# Patient Record
Sex: FEMALE | Employment: OTHER | URBAN - METROPOLITAN AREA
[De-identification: names, ages, dates, MRNs, and addresses within clinical notes are randomized per-mention and may not be internally consistent; named-entity substitution may affect disease eponyms.]

---

## 2017-05-09 DIAGNOSIS — I10 ESSENTIAL HYPERTENSION: ICD-10-CM

## 2017-05-10 RX ORDER — AMLODIPINE BESYLATE 5 MG/1
TABLET ORAL
Qty: 30 TAB | Refills: 5 | Status: SHIPPED | OUTPATIENT
Start: 2017-05-10 | End: 2018-04-05 | Stop reason: SDUPTHER

## 2017-06-06 ENCOUNTER — OFFICE VISIT (OUTPATIENT)
Dept: INTERNAL MEDICINE CLINIC | Age: 82
End: 2017-06-06

## 2017-06-06 VITALS
HEART RATE: 82 BPM | BODY MASS INDEX: 26.24 KG/M2 | RESPIRATION RATE: 20 BRPM | HEIGHT: 61 IN | TEMPERATURE: 97.5 F | DIASTOLIC BLOOD PRESSURE: 72 MMHG | OXYGEN SATURATION: 94 % | WEIGHT: 139 LBS | SYSTOLIC BLOOD PRESSURE: 154 MMHG

## 2017-06-06 DIAGNOSIS — R41.3 MEMORY DEFICIT: ICD-10-CM

## 2017-06-06 DIAGNOSIS — I10 ESSENTIAL HYPERTENSION: Primary | ICD-10-CM

## 2017-06-06 DIAGNOSIS — Z23 ENCOUNTER FOR IMMUNIZATION: ICD-10-CM

## 2017-06-06 DIAGNOSIS — M81.0 OSTEOPOROSIS, UNSPECIFIED OSTEOPOROSIS TYPE, UNSPECIFIED PATHOLOGICAL FRACTURE PRESENCE: ICD-10-CM

## 2017-06-06 DIAGNOSIS — Z00.00 MEDICARE ANNUAL WELLNESS VISIT, INITIAL: ICD-10-CM

## 2017-06-06 DIAGNOSIS — Z71.89 ACP (ADVANCE CARE PLANNING): ICD-10-CM

## 2017-06-06 NOTE — MR AVS SNAPSHOT
Visit Information Date & Time Provider Department Dept. Phone Encounter #  
 6/6/2017 12:30 PM Nneka Martins MD [facility] Internal Medicine [facility] [phone] [id number] Your Appointments 6/6/2017 12:30 PM  
[facility] App[facility] Note: follow up and wants to change to Dr Leanna Quinones  
 [address] [place] [address]. [place] [phone]  
  
   
 [address] [place] [address]. [address] [place] [id number] Upcoming Health Maintenance Date Due DTaP/Tdap/Td series (1 - Tdap) 8/15/1950 ZOSTER VACCINE AGE 60> 8/15/1989 Pneumococcal 65+ Low/Medium Risk (1 of 2 - PCV13) 8/15/1994 INFLUENZA AGE 9 TO ADULT 8/1/2017 MEDICARE YEARLY EXAM 6/7/2018 GLAUCOMA SCREENING Q2Y 6/6/2019 Allergies as of 6/6/2017  Review Complete On: 6/6/2017 By: Nneka Martins MD  
 No Known Allergies Current Immunizations  Never Reviewed Name Date Zoster Vaccine, Live  Incomplete Not reviewed this visit You Were Diagnosed With   
  
 Codes Comments Essential hypertension    -  Primary ICD-10-CM: I10 
ICD-9-CM: 401.9 Memory deficit     ICD-10-CM: R41.3 ICD-9-CM: 780.93 Encounter for immunization     ICD-10-CM: A56 ICD-9-CM: V03.89 Osteoporosis, unspecified osteoporosis type, unspecified pathological fracture presence     ICD-10-CM: M81.0 ICD-9-CM: 733.00   
 ACP (advance care planning)     ICD-10-CM: Z71.89 ICD-9-CM: V65.49 Vitals BP Pulse Temp Resp Height(growth percentile) Weight(growth percentile) 154/72 (BP 1 Location: Left arm, BP Patient Position: Sitting) 82 97.5 °F (36.4 °C) (Oral) 20 5' 1\" (1.549 m) 139 lb (63 kg) SpO2 BMI OB Status Smoking Status 94% 26.26 kg/m2 Hysterectomy Never Smoker Vitals History BMI and BSA Data Body Mass Index Body Surface Area  
 26.26 kg/m 2 1.65 m 2 Preferred Pharmacy Pharmacy Name Phone [facility] [phone] Your Updated Medication List  
  
   
This list is accurate as of: 17 11:10 AM.  Always use your most recent med list.  
  
  
  
  
 alendronate 70 mg tablet Commonly known as:  FOSAMAX Take 1 Tab by mouth every seven (7) days. amLODIPine 5 mg tablet Commonly known as:  Emilia Medici TAKE 1 TABLET BY MOUTH DAILY. Arm Brace Misc Commonly known as:  NEOPRENE WRIST SPLINT SUPPORT Use in both hands  
  
 ascorbic acid (vitamin C) 500 mg tablet Commonly known as:  VITAMIN C Take  by mouth.  
  
 calcium 500 mg Tab Take  by mouth. diclofenac EC 50 mg EC tablet Commonly known as:  VOLTAREN  
TAKE 1 TABLET BY MOUTH DAILY AS NEEDED. folic acid 425 mcg tablet Take 800 mcg by mouth daily. gabapentin 100 mg capsule Commonly known as:  NEURONTIN Take 1 Cap by mouth nightly. Leg Brace Misc Commonly known as:  ACE KNEE BRACE  
by Does Not Apply route. Use on right knee  
  
 loratadine 10 mg tablet Commonly known as:  Carloyn Ely Take 1 Tab by mouth daily as needed for Allergies. multivitamin tablet Commonly known as:  ONE A DAY Take 1 Tab by mouth daily. pneumococcal 13 noble conj dip 0.5 mL Syrg injection Commonly known as:  PREVNAR-13  
0.5 mL by IntraMUSCular route once for 1 dose. VITAMIN B-12 1,000 mcg tablet Generic drug:  cyanocobalamin Take 1,000 mcg by mouth daily. VITAMIN B-6 100 mg tablet Generic drug:  pyridoxine (vitamin B6) Take 100 mg by mouth daily. VITAMIN D3 1,000 unit tablet Generic drug:  cholecalciferol Take  by mouth daily. vitamin E 400 unit capsule Commonly known as:  [address] Take  by mouth daily. Prescriptions Sent to Pharmacy Refills  
 pneumococcal 13 noble conj dip (PREVNAR-13) 0.5 mL syrg injection 0 Si.5 mL by IntraMUSCular route once for 1 dose. Class: Normal  
 Pharmacy: [address] [place] [address]  Ph #: [phone] Route: IntraMUSCular We Performed the Following ZOSTER (SHINGLES) VACCINE, LIVE, SC INJECTION [id number] CPT(R)] Patient Instructions Schedule of Personalized Health Plan (Provide Copy to Patient) The best way to stay healthy is to live a healthy lifestyle. A healthy lifestyle includes regular exercise, eating a well-balanced diet, keeping a healthy weight and not smoking. Regular physical exams and screening tests are another important way to take care of yourself. Preventive exams provided by health care providers can find health problems early when treatment works best and can keep you from getting certain diseases or illnesses. Preventive services include exams, lab tests, screenings, shots, monitoring and information to help you take care of your own health. All people over 65 should have a pneumonia shot. Pneumonia shots are usually only needed once in a lifetime unless your doctor decides differently. WILL ORDER PNEUMONIA 13 VACCIEN TODAY. All people over 65 should have a yearly flu shot. AS SCHEDULE People over 65 are at medium to high risk for Hepatitis B. Three shots are needed for complete protection. In addition to your physical exam, some screening tests are recommended: 
 
Bone mass measurement (dexa scan) is recommended every two years. UP TO DATE Diabetes Mellitus screening is recommended every year. UP TO DATE Glaucoma is an eye disease caused by high pressure in the eye. An eye exam is recommended every year. PT DECLINED Cardiovascular screening tests that check your cholesterol and other blood fat (lipid) levels are recommended every five years.   
 
Colorectal Cancer screening tests help to find pre-cancerous polyps (growths in the colon) so they can be removed before they turn into cancer. Tests ordered for screening depend on your personal and family history risk factors. N/A Screening for Breast Cancer is recommended yearly with a mammogram.N/A Screening for Cervical Cancer is recommended every two years (annually for certain risk factors, such as previous history of STD or abnormal PAP in past 7 years), with a Pelvic Exam with PAP. N/A. WILL GIVE OPAPER FOR LIVING WILL. Here is a list of your current Health Maintenance items with a due date: 
Health Maintenance Topic Date Due  
 DTaP/Tdap/Td series (1 - Tdap) 08/15/1950  ZOSTER VACCINE AGE 60>  08/15/1989  GLAUCOMA SCREENING Q2Y  08/15/1994  Pneumococcal 65+ Low/Medium Risk (1 of 2 - PCV13) 08/15/1994  
 INFLUENZA AGE 9 TO ADULT  08/01/2017  MEDICARE YEARLY EXAM  06/07/2018  
 OSTEOPOROSIS SCREENING (DEXA)  Completed Introducing [facility]! Dear Isis Desouza: Thank you for requesting a [facility] account. Our records indicate that you have previously registered for a [facility] account but its currently inactive. Please call our [facility] support line at [phone]. Additional Information If you have questions, please visit the Frequently Asked Questions section of the [facility] website at [email or web address] Remember, [facility] is NOT to be used for urgent needs. For medical emergencies, dial 911. Now available from your iPhone and Android! Please provide this summary of care documentation to your next provider. Your primary care clinician is listed as Cha Singh. If you have any questions after today's visit, please call [phone].

## 2017-06-06 NOTE — PROGRESS NOTES
HISTORY OF PRESENT ILLNESS  Jeison Cotto is a 80 y.o. female here to follow up. Doing well. Has HTN,on med. no chest pain or palpitation or SOB. Has OP. on med. Has elevated lipid. wathing diet. Need vaccine. Has no living will. Reports compliance with medications and diet. Med list and most recent labs/studies reviewed with pt. t active physically to control weight. Needs med refills. Reports no other new c/o. Hypertension      Osteoporosis     Memory Loss    Her past medical history is significant for hypertension. Follow-up         Review of Systems   Constitutional: Negative. Negative for fever. Eyes: Negative. Respiratory: Negative. Cardiovascular: Negative. Gastrointestinal: Negative. Musculoskeletal: Positive for joint pain. Skin: Negative. Neurological: Negative. Psychiatric/Behavioral: Positive for memory loss. Physical Exam   Constitutional: She appears well-developed and well-nourished. No distress. Neck: Normal range of motion. Neck supple. No JVD present. No thyromegaly present. Cardiovascular: Normal rate, regular rhythm, normal heart sounds and intact distal pulses. Pulmonary/Chest: Effort normal and breath sounds normal. No respiratory distress. She has no wheezes. Musculoskeletal: She exhibits no edema or tenderness. Psychiatric: She has a normal mood and affect. Her behavior is normal.       ASSESSMENT and PLAN    Kaci Cole was seen today for hypertension, osteoporosis and memory loss. Diagnoses and all orders for this visit:    Essential hypertension  Stable. on med. Labs are stable. will repeat it next time. Memory deficit  MMSE 28. No dementia. Encounter for immunization  Will order,  -     pneumococcal 13 noble conj dip (PREVNAR-13) 0.5 mL syrg injection; 0.5 mL by IntraMUSCular route once for 1 dose.   -     ZOSTER (SHINGLES) VACCINE, LIVE, SC INJECTION    Osteoporosis, unspecified osteoporosis type, unspecified pathological fracture presence  On fosamax and calcium. ACP (advance care planning)    ACP discussed with pt in detail , including choosing a healthcare agent to communicate patient's healthcare decisions if patient lost the ability to make decisions, such as after a sudden illness or accident. Understanding of the healthcare agent role was assessed and information provided. Discussed values, goals, and preferences if recovery is not expected, even with continued medical treatment in the event of: Imminent death/serious illness. Recommended completion of Advance Directive form after review of ACP materials and conversation with prospective healthcare agent. Recommended communicating the plan and making copies for the healthcare agent, personal physician, and others as appropriate (e.g., health system). Recommended review of completed ACP document annually or upon change in health status. Information book and form given. Face to face time spent was16 minutes            Discussed expected course/resolution/complications of diagnosis in detail with patient. Medication risks/benefits/costs/interactions/alternatives discussed with patient. Pt was given an after visit summary which includes diagnoses, current medications & vitals. Pt expressed understanding with the diagnosis and plan.

## 2017-06-06 NOTE — PROGRESS NOTES
Miller Muniz is a 80 y.o. female and presents for annual Medicare Wellness Visit. Problem List: Reviewed with patient and discussed risk factors. Patient Active Problem List   Diagnosis Code    Essential hypertension I10    OP (osteoporosis) M81.0       Current medical providers:  Patient Care Team:  Darylene Skinner, MD as PCP - General (Internal Medicine)  Mikhail Wan LPN    PSH: Reviewed with patient  Past Surgical History:   Procedure Laterality Date    HX ANA AND BSO          SH: Reviewed with patient  Social History   Substance Use Topics    Smoking status: Never Smoker    Smokeless tobacco: None    Alcohol use No       FH: Reviewed with patient  History reviewed. No pertinent family history. Medications/Allergies: Reviewed with patient  Current Outpatient Prescriptions on File Prior to Visit   Medication Sig Dispense Refill    amLODIPine (NORVASC) 5 mg tablet TAKE 1 TABLET BY MOUTH DAILY. 30 Tab 5    Arm Brace (NEOPRENE WRIST SPLINT SUPPORT) misc Use in both hands 2 Each 0    alendronate (FOSAMAX) 70 mg tablet Take 1 Tab by mouth every seven (7) days. 4 Tab 12    gabapentin (NEURONTIN) 100 mg capsule Take 1 Cap by mouth nightly. 30 Cap 2    loratadine (CLARITIN) 10 mg tablet Take 1 Tab by mouth daily as needed for Allergies. 30 Tab 1    diclofenac EC (VOLTAREN) 50 mg EC tablet TAKE 1 TABLET BY MOUTH DAILY AS NEEDED. 20 Tab 0    vitamin E (AQUA GEMS) 400 unit capsule Take  by mouth daily.  ascorbic acid, vitamin C, (VITAMIN C) 500 mg tablet Take  by mouth.  folic acid 299 mcg tablet Take 800 mcg by mouth daily.  Leg Brace (ACE KNEE BRACE) misc by Does Not Apply route. Use on right knee 1 Each 0    multivitamin (ONE A DAY) tablet Take 1 Tab by mouth daily.  cholecalciferol (VITAMIN D3) 1,000 unit tablet Take  by mouth daily.  pyridoxine (VITAMIN B-6) 100 mg tablet Take 100 mg by mouth daily.       cyanocobalamin (VITAMIN B-12) 1,000 mcg tablet Take 1,000 mcg by mouth daily.  calcium 500 mg tab Take  by mouth. No current facility-administered medications on file prior to visit. No Known Allergies    Objective:  Visit Vitals    /72 (BP 1 Location: Left arm, BP Patient Position: Sitting)    Pulse 82    Temp 97.5 °F (36.4 °C) (Oral)    Resp 20    Ht 5' 1\" (1.549 m)    Wt 139 lb (63 kg)    SpO2 94%    BMI 26.26 kg/m2    Body mass index is 26.26 kg/(m^2). Assessment of cognitive impairment: Alert and oriented x 3    Depression Screen:   PHQ over the last two weeks 6/6/2017   Little interest or pleasure in doing things Not at all   Feeling down, depressed or hopeless Not at all   Total Score PHQ 2 0       Fall Risk Assessment:    Fall Risk Assessment, last 12 mths 6/6/2017   Able to walk? Yes   Fall in past 12 months? No       Functional Ability:   Does the patient exhibit a steady gait? yes   How long did it take the patient to get up and walk from a sitting position? 1 min   Is the patient self reliant?  (ie can do own laundry, meals, household chores)  yes     Does the patient handle his/her own medications? yes     Does the patient handle his/her own money? yes     Is the patients home safe (ie good lighting, handrails on stairs and bath, etc.)? yes     Did you notice or did patient express any hearing difficulties? no     Did you notice or did patient express any vision difficulties?   no     Were distance and reading eye charts used? no       Advance Care Planning:   Patient was offered the opportunity to discuss advance care planning:  yes     Does patient have an Advance Directive:  yes   If no, did you provide information on [facility] [employer]?   yes       Plan:      Orders Placed This Encounter    ZOSTER (SHINGLES) VACCINE, LIVE, SC INJECTION    pneumococcal 13 noble conj dip (PREVNAR-13) 0.5 mL syrg injection       Health Maintenance   Topic Date Due    DTaP/Tdap/Td series (1 - Tdap) 08/15/1950    ZOSTER VACCINE AGE 60> 08/15/1989    Pneumococcal 65+ Low/Medium Risk (1 of 2 - PCV13) 08/15/1994    INFLUENZA AGE 9 TO ADULT  08/01/2017    MEDICARE YEARLY EXAM  06/07/2018    GLAUCOMA SCREENING Q2Y  06/06/2019    OSTEOPOROSIS SCREENING (DEXA)  Completed       Patient verbalized understanding and agreement with the plan. A copy of the After Visit Summary with personalized health plan was given to the patient today.

## 2017-06-06 NOTE — PATIENT INSTRUCTIONS
Schedule of Personalized Health Plan  (Provide Copy to Patient)  The best way to stay healthy is to live a healthy lifestyle. A healthy lifestyle includes regular exercise, eating a well-balanced diet, keeping a healthy weight and not smoking. Regular physical exams and screening tests are another important way to take care of yourself. Preventive exams provided by health care providers can find health problems early when treatment works best and can keep you from getting certain diseases or illnesses. Preventive services include exams, lab tests, screenings, shots, monitoring and information to help you take care of your own health. All people over 65 should have a pneumonia shot. Pneumonia shots are usually only needed once in a lifetime unless your doctor decides differently. WILL ORDER PNEUMONIA 13 VACCIEN TODAY. All people over 65 should have a yearly flu shot. AS SCHEDULE    People over 65 are at medium to high risk for Hepatitis B. Three shots are needed for complete protection. In addition to your physical exam, some screening tests are recommended:    Bone mass measurement (dexa scan) is recommended every two years. UP TO DATE  Diabetes Mellitus screening is recommended every year. UP TO DATE    Glaucoma is an eye disease caused by high pressure in the eye. An eye exam is recommended every year. PT DECLINED    Cardiovascular screening tests that check your cholesterol and other blood fat (lipid) levels are recommended every five years. Colorectal Cancer screening tests help to find pre-cancerous polyps (growths in the colon) so they can be removed before they turn into cancer. Tests ordered for screening depend on your personal and family history risk factors. N/A    Screening for Breast Cancer is recommended yearly with a mammogram.N/A    Screening for Cervical Cancer is recommended every two years (annually for certain risk factors, such as previous history of STD or abnormal PAP in past 7 years), with a Pelvic Exam with PAP. N/A. WILL GIVE OPAPER FOR LIVING WILL.     Here is a list of your current Health Maintenance items with a due date:  Health Maintenance   Topic Date Due    DTaP/Tdap/Td series (1 - Tdap) 08/15/1950    ZOSTER VACCINE AGE 60>  08/15/1989    GLAUCOMA SCREENING Q2Y  08/15/1994    Pneumococcal 65+ Low/Medium Risk (1 of 2 - PCV13) 08/15/1994    INFLUENZA AGE 9 TO ADULT  08/01/2017    MEDICARE YEARLY EXAM  06/07/2018    OSTEOPOROSIS SCREENING (DEXA)  Completed

## 2017-06-06 NOTE — PROGRESS NOTES
Health Maintenance Due   Topic Date Due    DTaP/Tdap/Td series (1 - Tdap) 08/15/1950    ZOSTER VACCINE AGE 60>  08/15/1989    GLAUCOMA SCREENING Q2Y  08/15/1994    Pneumococcal 65+ Low/Medium Risk (1 of 2 - PCV13) 08/15/1994    MEDICARE YEARLY EXAM  08/15/1994       Chief Complaint   Patient presents with    Hypertension     wants to change to Dr Salvador Gonzalez Osteoporosis       1. Have you been to the ER, urgent care clinic since your last visit? Hospitalized since your last visit? No    2. Have you seen or consulted any other health care providers outside of the [address] [place] [address] since your last visit? Include any pap smears or colon screening. No    3) Do you have an Advance Directive on file? no    4) Are you interested in receiving information on Advance Directives? NO      Patient is accompanied by self I have received verbal consent from Kendell Gomez to discuss any/all medical information while they are present in the room.

## 2017-07-06 ENCOUNTER — OFFICE VISIT (OUTPATIENT)
Dept: INTERNAL MEDICINE CLINIC | Age: 82
End: 2017-07-06

## 2017-07-06 VITALS
HEIGHT: 61 IN | TEMPERATURE: 98.5 F | WEIGHT: 138.6 LBS | SYSTOLIC BLOOD PRESSURE: 152 MMHG | HEART RATE: 85 BPM | RESPIRATION RATE: 18 BRPM | OXYGEN SATURATION: 97 % | BODY MASS INDEX: 26.17 KG/M2 | DIASTOLIC BLOOD PRESSURE: 72 MMHG

## 2017-07-06 DIAGNOSIS — I10 ESSENTIAL HYPERTENSION: ICD-10-CM

## 2017-07-06 DIAGNOSIS — G89.29 CHRONIC PAIN OF RIGHT KNEE: Primary | ICD-10-CM

## 2017-07-06 DIAGNOSIS — M17.0 PRIMARY OSTEOARTHRITIS OF BOTH KNEES: ICD-10-CM

## 2017-07-06 DIAGNOSIS — R41.3 MEMORY IMPAIRMENT: ICD-10-CM

## 2017-07-06 DIAGNOSIS — M81.0 OSTEOPOROSIS, UNSPECIFIED OSTEOPOROSIS TYPE, UNSPECIFIED PATHOLOGICAL FRACTURE PRESENCE: ICD-10-CM

## 2017-07-06 DIAGNOSIS — M71.21 BAKER'S CYST, RIGHT: ICD-10-CM

## 2017-07-06 DIAGNOSIS — R26.9 GAIT DIFFICULTY: ICD-10-CM

## 2017-07-06 DIAGNOSIS — M25.561 CHRONIC PAIN OF RIGHT KNEE: Primary | ICD-10-CM

## 2017-07-06 PROBLEM — M71.20 BAKER'S CYST: Status: ACTIVE | Noted: 2017-07-06

## 2017-07-06 RX ORDER — DICLOFENAC SODIUM 10 MG/G
GEL TOPICAL 4 TIMES DAILY
Qty: 100 G | Refills: 5 | Status: SHIPPED | OUTPATIENT
Start: 2017-07-06 | End: 2018-12-17

## 2017-07-06 RX ORDER — ACETAMINOPHEN 500 MG
500 TABLET ORAL
Qty: 30 TAB | Status: SHIPPED | OUTPATIENT
Start: 2017-07-06 | End: 2018-12-27 | Stop reason: ALTCHOICE

## 2017-07-06 NOTE — PROGRESS NOTES
HISTORY OF PRESENT ILLNESS  Julián Garrison is a 80 y.o. female. Pt. comes in for f/u. Has multiple medical problems. reports 1 week of increased R knee and lower leg pain. Slipped but no fall. H/o baker's cyst.  Using a rollator. Takes aspirin for pain. Has osteoporosis but not interested in meds. Memory is a bit poor. Her daughter lives in [place]. Reports compliance with BP medication and diet. Med list and most recent labs/studies reviewed with pt. Trying to be active physically as tolerated. Reports no other new c/o. Knee Pain   Pertinent negatives include no chest pain, no abdominal pain, no headaches and no shortness of breath. Hypertension    Pertinent negatives include no chest pain, no blurred vision, no headaches, no dizziness and no shortness of breath. Arthritis   Pertinent negatives include no chest pain, no abdominal pain, no headaches and no shortness of breath. Review of Systems   Constitutional: Negative. Eyes: Negative for blurred vision. Respiratory: Negative for shortness of breath. Cardiovascular: Negative for chest pain and leg swelling. Gastrointestinal: Negative for abdominal pain and heartburn. Genitourinary: Negative for dysuria. Musculoskeletal: Positive for joint pain. Negative for back pain and falls. Skin: Negative for rash. Neurological: Negative for dizziness, sensory change and headaches. Psychiatric/Behavioral: Positive for memory loss. Negative for depression. The patient is not nervous/anxious and does not have insomnia. All other systems reviewed and are negative. Physical Exam   Constitutional: She is oriented to person, place, and time. She appears well-developed and well-nourished. No distress. HENT:   Head: Normocephalic and atraumatic. Mouth/Throat: Oropharynx is clear and moist.   Eyes: Conjunctivae are normal. No scleral icterus. Neck: Normal range of motion. Neck supple. No JVD present. No thyromegaly present.    Cardiovascular: Normal rate, regular rhythm, normal heart sounds and intact distal pulses. No murmur heard. Pulmonary/Chest: Effort normal and breath sounds normal. No respiratory distress. She has no wheezes. She has no rales. Abdominal: Soft. Bowel sounds are normal. She exhibits no distension. Musculoskeletal: She exhibits tenderness (R knee and politeal area with baker's cyst). She exhibits no edema. Neurological: She is alert and oriented to person, place, and time. Coordination abnormal.   Skin: Skin is warm and dry. No rash noted. Psychiatric: She has a normal mood and affect. Her behavior is normal.   Nursing note and vitals reviewed. ASSESSMENT and PLAN  Judie Hall was seen today for knee pain, hypertension and arthritis. Diagnoses and all orders for this visit:    Chronic pain of right knee    Baker's cyst, right    Primary osteoarthritis of both knees    Essential hypertension    Osteoporosis, unspecified osteoporosis type, unspecified pathological fracture presence    Gait difficulty    Memory impairment    Other orders  -     diclofenac (VOLTAREN) 1 % gel; Apply  to affected area four (4) times daily. -     acetaminophen (TYLENOL) 500 mg tablet; Take 1 Tab by mouth three (3) times daily as needed for Pain. Follow-up Disposition:  Return in about 1 month (around 8/6/2017).    lab results and schedule of future lab studies reviewed with patient  reviewed diet, exercise and weight control  reviewed medications and side effects in detail  Not interested in osteoporosis meds  Will give her a cortisone shot if no better in a few weeks

## 2017-07-06 NOTE — MR AVS SNAPSHOT
Visit Information Date & Time Provider Department Dept. Phone Encounter #  
 7/6/2017  9:30 AM Ethel Hastings, [address] [address] [id number] Follow-up Instructions Return in about 1 month (around 8/6/2017). Upcoming Health Maintenance Date Due DTaP/Tdap/Td series (1 - Tdap) 8/15/1950 ZOSTER VACCINE AGE 60> 8/15/1989 Pneumococcal 65+ Low/Medium Risk (1 of 2 - PCV13) 8/15/1994 INFLUENZA AGE 9 TO ADULT 8/1/2017 MEDICARE YEARLY EXAM 6/7/2018 GLAUCOMA SCREENING Q2Y 6/6/2019 Allergies as of 7/6/2017  Review Complete On: 7/6/2017 By: Ethel Hastings, DO No Known Allergies Current Immunizations  Never Reviewed Name Date Zoster Vaccine, Live  Deferred (Medication not available) Not reviewed this visit You Were Diagnosed With   
  
 Codes Comments Chronic pain of right knee    -  Primary ICD-10-CM: M25.561, G76.21 ICD-9-CM: 719.46, 338.29 Baker's cyst, right     ICD-10-CM: M71.21 
ICD-9-CM: 727.51 Primary osteoarthritis of both knees     ICD-10-CM: M17.0 ICD-9-CM: 715.16 Essential hypertension     ICD-10-CM: I10 
ICD-9-CM: 401.9 Osteoporosis, unspecified osteoporosis type, unspecified pathological fracture presence     ICD-10-CM: M81.0 ICD-9-CM: 733.00 Gait difficulty     ICD-10-CM: R26.9 ICD-9-CM: 928. 2 Vitals BP Pulse Temp Resp Height(growth percentile) Weight(growth percentile) 152/72 (BP 1 Location: Right arm, BP Patient Position: Sitting) 85 98.5 °F (36.9 °C) (Oral) 18 5' 0.98\" (1.549 m) 138 lb 9.6 oz (62.9 kg) SpO2 BMI OB Status Smoking Status 97% 26.2 kg/m2 Hysterectomy Never Smoker BMI and BSA Data Body Mass Index Body Surface Area  
 26.2 kg/m 2 1.65 m 2 Preferred Pharmacy Pharmacy Name Phone [facility] [phone] Your Updated Medication List  
  
   
 This list is accurate as of: 7/6/17  9:35 AM.  Always use your most recent med list.  
  
  
  
  
 acetaminophen 500 mg tablet Commonly known as:  TYLENOL Take 1 Tab by mouth three (3) times daily as needed for Pain. amLODIPine 5 mg tablet Commonly known as:  Liv Brandyn TAKE 1 TABLET BY MOUTH DAILY. Arm Brace Misc Commonly known as:  NEOPRENE WRIST SPLINT SUPPORT Use in both hands  
  
 ascorbic acid (vitamin C) 500 mg tablet Commonly known as:  VITAMIN C Take  by mouth.  
  
 calcium 500 mg Tab Take  by mouth. diclofenac 1 % Gel Commonly known as:  VOLTAREN Apply  to affected area four (4) times daily. folic acid 954 mcg tablet Take 800 mcg by mouth daily. Leg Brace Misc Commonly known as:  ACE KNEE BRACE  
by Does Not Apply route. Use on right knee  
  
 multivitamin tablet Commonly known as:  ONE A DAY Take 1 Tab by mouth daily. VITAMIN B-12 1,000 mcg tablet Generic drug:  cyanocobalamin Take 1,000 mcg by mouth daily. VITAMIN B-6 100 mg tablet Generic drug:  pyridoxine (vitamin B6) Take 100 mg by mouth daily. VITAMIN D3 1,000 unit tablet Generic drug:  cholecalciferol Take  by mouth daily. vitamin E 400 unit capsule Commonly known as:  [address] Take  by mouth daily. Prescriptions Sent to Pharmacy Refills  
 diclofenac (VOLTAREN) 1 % gel 5 Sig: Apply  to affected area four (4) times daily. Class: Normal  
 Pharmacy: [address] Ph #: [phone] Route: Topical  
 acetaminophen (TYLENOL) 500 mg tablet prn Sig: Take 1 Tab by mouth three (3) times daily as needed for Pain. Class: Normal  
 Pharmacy: [address] Ph #: [phone] Route: Oral  
  
Follow-up Instructions Return in about 1 month (around 8/6/2017). Introducing [facility]! Dear Jason Gill: Thank you for requesting a Nextnav account. Our records indicate that you have previously registered for a Nextnav account but its currently inactive. Please call our Nextnav support line at [phone]. Additional Information If you have questions, please visit the Frequently Asked Questions section of the Nextnav website at [email or web address] Remember, Nextnav is NOT to be used for urgent needs. For medical emergencies, dial 911. Now available from your iPhone and Android! Please provide this summary of care documentation to your next provider. Your primary care clinician is listed as Ginger Guidry. If you have any questions after today's visit, please call [phone].

## 2017-08-03 ENCOUNTER — OFFICE VISIT (OUTPATIENT)
Dept: INTERNAL MEDICINE CLINIC | Age: 82
End: 2017-08-03

## 2017-08-03 VITALS
HEIGHT: 61 IN | SYSTOLIC BLOOD PRESSURE: 167 MMHG | TEMPERATURE: 99.3 F | DIASTOLIC BLOOD PRESSURE: 72 MMHG | OXYGEN SATURATION: 96 % | HEART RATE: 81 BPM | WEIGHT: 140 LBS | BODY MASS INDEX: 26.43 KG/M2 | RESPIRATION RATE: 18 BRPM

## 2017-08-03 DIAGNOSIS — M17.0 PRIMARY OSTEOARTHRITIS OF BOTH KNEES: ICD-10-CM

## 2017-08-03 DIAGNOSIS — M71.21 BAKER'S CYST, RIGHT: ICD-10-CM

## 2017-08-03 DIAGNOSIS — G89.29 CHRONIC PAIN OF RIGHT KNEE: Primary | ICD-10-CM

## 2017-08-03 DIAGNOSIS — M25.561 CHRONIC PAIN OF RIGHT KNEE: Primary | ICD-10-CM

## 2017-08-03 DIAGNOSIS — M81.0 OSTEOPOROSIS, UNSPECIFIED OSTEOPOROSIS TYPE, UNSPECIFIED PATHOLOGICAL FRACTURE PRESENCE: ICD-10-CM

## 2017-08-03 DIAGNOSIS — I10 ESSENTIAL HYPERTENSION: ICD-10-CM

## 2017-08-03 NOTE — PROGRESS NOTES
HISTORY OF PRESENT ILLNESS  Arlyn Henson is a 80 y.o. female. Pt. comes in for f/u. Has multiple medical problems. Reports  continued R knee and lower leg pain. Has baker's cyst.  Using a rollator. Takes aspirin for pain. Aspercreme helps. Voltaren gel burned her skin. Has osteoporosis but not interested in meds. Memory is a bit poor. Reports compliance with BP medication and diet. Med list and most recent labs/studies reviewed with pt. Trying to be active physically as tolerated. Reports no other new c/o. Hypertension    Pertinent negatives include no chest pain, no blurred vision, no headaches, no dizziness and no shortness of breath. Osteoporosis   Pertinent negatives include no chest pain, no abdominal pain, no headaches and no shortness of breath. Memory Loss    Her past medical history is significant for hypertension. Knee Pain   Pertinent negatives include no chest pain, no abdominal pain, no headaches and no shortness of breath. Review of Systems   Constitutional: Negative. Eyes: Negative for blurred vision. Respiratory: Negative for shortness of breath. Cardiovascular: Negative for chest pain and leg swelling. Gastrointestinal: Negative for abdominal pain. Genitourinary: Negative for dysuria. Musculoskeletal: Positive for joint pain. Negative for back pain and falls. Skin: Negative. Neurological: Negative for dizziness, sensory change and headaches. Psychiatric/Behavioral: Positive for memory loss. Negative for depression. The patient is not nervous/anxious and does not have insomnia. All other systems reviewed and are negative. Physical Exam   Constitutional: She is oriented to person, place, and time. She appears well-developed and well-nourished. No distress. HENT:   Head: Normocephalic and atraumatic. Mouth/Throat: Oropharynx is clear and moist.   Eyes: Conjunctivae are normal. No scleral icterus. Neck: Normal range of motion. Neck supple.  No thyromegaly present. Cardiovascular: Normal rate, regular rhythm, normal heart sounds and intact distal pulses. No murmur heard. Pulmonary/Chest: Effort normal and breath sounds normal. No respiratory distress. Abdominal: Soft. Bowel sounds are normal. She exhibits no distension. Musculoskeletal: She exhibits tenderness (R knee and politeal area with baker's cyst). She exhibits no edema. Neurological: She is alert and oriented to person, place, and time. Coordination abnormal.   Skin: Skin is warm and dry. Psychiatric: She has a normal mood and affect. Her behavior is normal.   Nursing note and vitals reviewed. ASSESSMENT and PLAN  Diagnoses and all orders for this visit:    1. Chronic pain of right knee  -     DRAIN/INJECT LARGE JOINT/BURSA  -     METHYLPREDNISOLONE ACETATE INJECTION 40 MG    2. Baker's cyst, right    3. Primary osteoarthritis of both knees  -     DRAIN/INJECT LARGE JOINT/BURSA  -     METHYLPREDNISOLONE ACETATE INJECTION 40 MG    4. Osteoporosis, unspecified osteoporosis type, unspecified pathological fracture presence    5. Essential hypertension      Follow-up Disposition:  Return in about 3 months (around 11/3/2017). lab results and schedule of future lab studies reviewed with patient  reviewed diet, exercise and weight control  reviewed medications and side effects in detail  I injected her right knee with cortisone and lidocaine. Patient tolerated procedure well.   No complications  Advised to apply ice off and on dresser today  Continue Aspercreme and Tylenol as before  She is not interested in seeing an orthopedic MD

## 2017-08-03 NOTE — PROGRESS NOTES
Health Maintenance Due   Topic Date Due    DTaP/Tdap/Td series (1 - Tdap) 08/15/1950    ZOSTER VACCINE AGE 60>  06/15/1989    Pneumococcal 65+ Low/Medium Risk (1 of 2 - PCV13) 08/15/1994    INFLUENZA AGE 9 TO ADULT  08/01/2017       Chief Complaint   Patient presents with    Hypertension    Osteoporosis    Memory Loss    Osteoarthritis       1. Have you been to the ER, urgent care clinic since your last visit? Hospitalized since your last visit? No    2. Have you seen or consulted any other health care providers outside of the [address] [place] [address] since your last visit? Include any pap smears or colon screening. No    3) Do you have an Advance Directive on file? no    4) Are you interested in receiving information on Advance Directives? NO      Patient is accompanied by self I have received verbal consent from Dolly Hawthorne to discuss any/all medical information while they are present in the room.     [facility]  OFFICE PROCEDURE PROGRESS NOTE        Chart reviewed for the following:   Lillie WARD LPN, have reviewed the History, Physical and updated the Allergic reactions for [address] performed immediately prior to start of procedure:   Lillie WARD LPN, have performed the following reviews on Dolly Yannikacey prior to the start of the procedure:            * Patient was identified by name and date of birth   * Agreement on procedure being performed was verified  * Risks and Benefits explained to the patient  * Procedure site verified and marked as necessary  * Patient was positioned for comfort  * Consent was signed and verified     Time: 0945      Date of procedure: 8/3/2017    Procedure performed by:  Davin Cui DO    Provider assisted by: Kadeem Ugalde    Patient assisted by: self    How tolerated by patient: tolerated well    Post Procedural Pain Scale: 0    Comments: patient left clinic on foot using her rollator

## 2017-08-03 NOTE — MR AVS SNAPSHOT
Visit Information Date & Time Provider Department Dept. Phone Encounter #  
 8/3/2017  9:00 AM Davin Cui, [address] [phone] [id number] Follow-up Instructions Return in about 3 months (around 11/3/2017). Upcoming Health Maintenance Date Due DTaP/Tdap/Td series (1 - Tdap) 8/15/1950 ZOSTER VACCINE AGE 60> 6/15/1989 Pneumococcal 65+ Low/Medium Risk (1 of 2 - PCV13) 8/15/1994 INFLUENZA AGE 9 TO ADULT 8/1/2017 MEDICARE YEARLY EXAM 6/7/2018 GLAUCOMA SCREENING Q2Y 6/6/2019 Allergies as of 8/3/2017  Review Complete On: 8/3/2017 By: Davin Cui, DO No Known Allergies Current Immunizations  Never Reviewed Name Date Zoster Vaccine, Live  Deferred (Medication not available) Not reviewed this visit You Were Diagnosed With   
  
 Codes Comments Chronic pain of right knee    -  Primary ICD-10-CM: M25.561, I89.29 ICD-9-CM: 719.46, 338.29 Baker's cyst, right     ICD-10-CM: M71.21 
ICD-9-CM: 727.51 Primary osteoarthritis of both knees     ICD-10-CM: M17.0 ICD-9-CM: 715.16 Osteoporosis, unspecified osteoporosis type, unspecified pathological fracture presence     ICD-10-CM: M81.0 ICD-9-CM: 733.00 Essential hypertension     ICD-10-CM: I10 
ICD-9-CM: 401.9 Vitals BP Pulse Temp Resp Height(growth percentile) Weight(growth percentile) 167/72 (BP 1 Location: Left arm, BP Patient Position: Sitting) 81 99.3 °F (37.4 °C) (Oral) 18 5' 0.98\" (1.549 m) 140 lb (63.5 kg) SpO2 BMI OB Status Smoking Status 96% 26.47 kg/m2 Hysterectomy Never Smoker Vitals History BMI and BSA Data Body Mass Index Body Surface Area  
 26.47 kg/m 2 1.65 m 2 Preferred Pharmacy Pharmacy Name Phone [facility] [phone] Your Updated Medication List  
  
   
 This list is accurate as of: 8/3/17  9:49 AM.  Always use your most recent med list.  
  
  
  
  
 acetaminophen 500 mg tablet Commonly known as:  TYLENOL Take 1 Tab by mouth three (3) times daily as needed for Pain. amLODIPine 5 mg tablet Commonly known as:  Tracy Bars TAKE 1 TABLET BY MOUTH DAILY. Arm Brace Misc Commonly known as:  NEOPRENE WRIST SPLINT SUPPORT Use in both hands  
  
 ascorbic acid (vitamin C) 500 mg tablet Commonly known as:  VITAMIN C Take  by mouth.  
  
 calcium 500 mg Tab Take  by mouth. diclofenac 1 % Gel Commonly known as:  VOLTAREN Apply  to affected area four (4) times daily. folic acid 565 mcg tablet Take 800 mcg by mouth daily. Leg Brace Misc Commonly known as:  ACE KNEE BRACE  
by Does Not Apply route. Use on right knee  
  
 multivitamin tablet Commonly known as:  ONE A DAY Take 1 Tab by mouth daily. VITAMIN B-12 1,000 mcg tablet Generic drug:  cyanocobalamin Take 1,000 mcg by mouth daily. VITAMIN B-6 100 mg tablet Generic drug:  pyridoxine (vitamin B6) Take 100 mg by mouth daily. VITAMIN D3 1,000 unit tablet Generic drug:  cholecalciferol Take  by mouth daily. vitamin E 400 unit capsule Commonly known as:  [address] Take  by mouth daily. We Performed the Following DRAIN/INJECT LARGE JOINT/BURSA [id number] CPT(R)] METHYLPREDNISOLONE ACETATE INJECTION 40 MG [ \[facility]\""] Follow-up Instructions Return in about 3 months (around 11/3/2017). Patient Instructions Learning About a Facet Joint Injection What is a facet joint injection? A facet joint injection is a shot of medicine to help with pain from arthritis or other causes. The injection goes into your neck or back, depending on where your pain is. Facet joints connect your vertebrae to each other.  Problems in these joints can cause long-term (chronic) pain in the neck or back, or sometimes in the shoulders, arms, buttocks, or legs. The injection contains a numbing medicine, which works right away for a short time. After the numbing medicine works, the doctor usually will add a steroid medicine to the injection. Steroids reduce swelling and pain, but they don't always work. How is a facet joint injection done? You may get medicine to help you relax. The doctor will use a tiny needle to numb the skin in the area where you are getting the facet joint injection. After the skin is numb, your doctor will use a larger needle for the actual facet joint injection. He or she will use X-rays to help guide the needle into the facet joint. You may feel some pressure. But you should not feel pain. The procedure takes 10 to 30 minutes. You will probably go home about an hour after your injection. What can you expect after a facet joint injection? You may have numbness for a few hours. The numbness could be in your neck or back, or your arm or leg, depending on where you got the shot. You will need someone to drive you home. Your pain may be gone right away. But it may return after a few hours or days. This is because the steroid medicine has not started to work yet. Steroids don't always work. And when they do, it takes a few days. But when they work, the pain relief can last for several days to a few months or longer. You may want to do less than normal for a few days. But you may also be able to return to your daily routine. It's usually best to increase your activities slowly over time. Follow your doctor's instructions carefully. Follow-up care is a key part of your treatment and safety. Be sure to make and go to all appointments, and call your doctor if you are having problems. It's also a good idea to know your test results and keep a list of the medicines you take. Where can you learn more? Go to http://mark-ricardo.info/. Enter B481 in the search box to learn more about \"Learning About a Facet Joint Injection. \" Current as of: March 21, 2017 Content Version: 11.3 © 5834-9295 Design Within Reach. Care instructions adapted under license by Open Box [employer] (which disclaims liability or warranty for this information). If you have questions about a medical condition or this instruction, always ask your healthcare professional. [address] any warranty or liability for your use of this information. Introducing \[facility]\"" & [facility] SERVICES! Dear Anthony Cagle: Thank you for requesting a ip.access account. Our records indicate that you have previously registered for a ip.access account but its currently inactive. Please call our ip.access support line at [phone]. Additional Information If you have questions, please visit the Frequently Asked Questions section of the ip.access website at [email or web address] Remember, ip.access is NOT to be used for urgent needs. For medical emergencies, dial 911. Now available from your iPhone and Android! Please provide this summary of care documentation to your next provider. Your primary care clinician is listed as Rossy Cruz. If you have any questions after today's visit, please call [phone].

## 2017-08-03 NOTE — PATIENT INSTRUCTIONS
Learning About a Facet Joint Injection  What is a facet joint injection? A facet joint injection is a shot of medicine to help with pain from arthritis or other causes. The injection goes into your neck or back, depending on where your pain is. Facet joints connect your vertebrae to each other. Problems in these joints can cause long-term (chronic) pain in the neck or back, or sometimes in the shoulders, arms, buttocks, or legs. The injection contains a numbing medicine, which works right away for a short time. After the numbing medicine works, the doctor usually will add a steroid medicine to the injection. Steroids reduce swelling and pain, but they don't always work. How is a facet joint injection done? You may get medicine to help you relax. The doctor will use a tiny needle to numb the skin in the area where you are getting the facet joint injection. After the skin is numb, your doctor will use a larger needle for the actual facet joint injection. He or she will use X-rays to help guide the needle into the facet joint. You may feel some pressure. But you should not feel pain. The procedure takes 10 to 30 minutes. You will probably go home about an hour after your injection. What can you expect after a facet joint injection? You may have numbness for a few hours. The numbness could be in your neck or back, or your arm or leg, depending on where you got the shot. You will need someone to drive you home. Your pain may be gone right away. But it may return after a few hours or days. This is because the steroid medicine has not started to work yet. Steroids don't always work. And when they do, it takes a few days. But when they work, the pain relief can last for several days to a few months or longer. You may want to do less than normal for a few days. But you may also be able to return to your daily routine. It's usually best to increase your activities slowly over time.  Follow your doctor's instructions carefully. Follow-up care is a key part of your treatment and safety. Be sure to make and go to all appointments, and call your doctor if you are having problems. It's also a good idea to know your test results and keep a list of the medicines you take. Where can you learn more? Go to http://mark-ricardo.info/. Enter B481 in the search box to learn more about \"Learning About a Facet Joint Injection. \"  Current as of: March 21, 2017  Content Version: 11.3  © 3015-1952 Protecode, Incorporated. Care instructions adapted under license by Wildcard (which disclaims liability or warranty for this information). If you have questions about a medical condition or this instruction, always ask your healthcare professional. [address] any warranty or liability for your use of this information.

## 2017-11-02 ENCOUNTER — OFFICE VISIT (OUTPATIENT)
Dept: INTERNAL MEDICINE CLINIC | Age: 82
End: 2017-11-02

## 2017-11-02 VITALS
DIASTOLIC BLOOD PRESSURE: 66 MMHG | WEIGHT: 139 LBS | OXYGEN SATURATION: 98 % | HEART RATE: 67 BPM | SYSTOLIC BLOOD PRESSURE: 133 MMHG | HEIGHT: 60 IN | RESPIRATION RATE: 19 BRPM | BODY MASS INDEX: 27.29 KG/M2

## 2017-11-02 DIAGNOSIS — I10 ESSENTIAL HYPERTENSION: Primary | ICD-10-CM

## 2017-11-02 DIAGNOSIS — M71.21 SYNOVIAL CYST OF RIGHT POPLITEAL SPACE: ICD-10-CM

## 2017-11-02 DIAGNOSIS — M81.0 AGE RELATED OSTEOPOROSIS, UNSPECIFIED PATHOLOGICAL FRACTURE PRESENCE: ICD-10-CM

## 2017-11-02 DIAGNOSIS — M17.0 PRIMARY OSTEOARTHRITIS OF BOTH KNEES: ICD-10-CM

## 2017-11-02 NOTE — MR AVS SNAPSHOT
Visit Information Date & Time Provider Department Dept. Phone Encounter #  
 11/2/2017  9:00 AM Jaspreet Doshi, [address] [phone] [id number] Follow-up Instructions Return in about 4 months (around 3/2/2018). Upcoming Health Maintenance Date Due DTaP/Tdap/Td series (1 - Tdap) 8/15/1950 ZOSTER VACCINE AGE 60> 6/15/1989 Pneumococcal 65+ Low/Medium Risk (1 of 2 - PCV13) 8/15/1994 MEDICARE YEARLY EXAM 6/7/2018 GLAUCOMA SCREENING Q2Y 6/6/2019 Allergies as of 11/2/2017  Review Complete On: 11/2/2017 By: Jaspreet Doshi DO No Known Allergies Current Immunizations  Reviewed on 11/2/2017 Name Date Zoster Vaccine, Live  Deferred (Medication not available) Reviewed by Jaspreet Doshi DO on 11/2/2017 at 10:27 AM  
You Were Diagnosed With   
  
 Codes Comments Essential hypertension    -  Primary ICD-10-CM: I10 
ICD-9-CM: 401.9 Primary osteoarthritis of both knees     ICD-10-CM: M17.0 ICD-9-CM: 715.16 Synovial cyst of right popliteal space     ICD-10-CM: M71.21 
ICD-9-CM: 727.51 Age related osteoporosis, unspecified pathological fracture presence     ICD-10-CM: M81.0 ICD-9-CM: 733.01 Vitals BP Pulse Resp Height(growth percentile) Weight(growth percentile) SpO2  
 133/66 (BP 1 Location: Right arm, BP Patient Position: Sitting) 67 19 5' (1.524 m) 139 lb (63 kg) 98% BMI OB Status Smoking Status 27.15 kg/m2 Hysterectomy Never Smoker Vitals History BMI and BSA Data Body Mass Index Body Surface Area  
 27.15 kg/m 2 1.63 m 2 Preferred Pharmacy Pharmacy Name [facility] [phone] Your Updated Medication List  
  
   
This list is accurate as of: 11/2/17 10:31 AM.  Always use your most recent med list.  
  
  
  
  
 acetaminophen 500 mg tablet Commonly known as:  TYLENOL  
 Take 1 Tab by mouth three (3) times daily as needed for Pain. amLODIPine 5 mg tablet Commonly known as:  [place] TAKE 1 TABLET BY MOUTH DAILY. Arm Brace Misc Commonly known as:  NEOPRENE WRIST SPLINT SUPPORT Use in both hands  
  
 ascorbic acid (vitamin C) 500 mg tablet Commonly known as:  VITAMIN C Take  by mouth.  
  
 calcium 500 mg Tab Take  by mouth. diclofenac 1 % Gel Commonly known as:  VOLTAREN Apply  to affected area four (4) times daily. folic acid 643 mcg tablet Take 800 mcg by mouth daily. Leg Brace Misc Commonly known as:  ACE KNEE BRACE  
by Does Not Apply route. Use on right knee  
  
 multivitamin tablet Commonly known as:  ONE A DAY Take 1 Tab by mouth daily. VITAMIN B-12 1,000 mcg tablet Generic drug:  cyanocobalamin Take 1,000 mcg by mouth daily. VITAMIN B-6 100 mg tablet Generic drug:  pyridoxine (vitamin B6) Take 100 mg by mouth daily. VITAMIN D3 1,000 unit tablet Generic drug:  cholecalciferol Take  by mouth daily. vitamin E 400 unit capsule Commonly known as:  [address] Take  by mouth daily. We Performed the Following CBC W/O DIFF [78105 CPT(R)] METABOLIC PANEL, BASIC [01662 CPT(R)] VITAMIN D, 25 HYDROXY [id number] CPT(R)] Follow-up Instructions Return in about 4 months (around 3/2/2018). Introducing [facility] & HE[facility] SERVICES! Dear Regina Deaner: Thank you for requesting a [employer] account. Our records indicate that you have previously registered for a [employer] account but its currently inactive. Please call our [employer] support line at [phone]. Additional Information If you have questions, please visit the Frequently Asked Questions section of the [employer] website at [email or web address] Remember, [employer] is NOT to be used for urgent needs. For medical emergencies, dial 911. Now available from your iPhone and Android! Please provide this summary of care documentation to your next provider. Your primary care clinician is listed as Tra Franklin. If you have any questions after today's visit, please call [phone].

## 2017-11-02 NOTE — PROGRESS NOTES
Health Maintenance Due   Topic Date Due    DTaP/Tdap/Td series (1 - Tdap) 08/15/1950    ZOSTER VACCINE AGE 60>  06/15/1989    Pneumococcal 65+ Low/Medium Risk (1 of 2 - PCV13) 08/15/1994    INFLUENZA AGE 9 TO ADULT  08/01/2017       Chief Complaint   Patient presents with    Cyst     behind bilateral knee    Osteoporosis    Hypertension       1. Have you been to the ER, urgent care clinic since your last visit? Hospitalized since your last visit? No    2. Have you seen or consulted any other health care providers outside of the [address] [place] [address] since your last visit? Include any pap smears or colon screening. No    3) Do you have an Advance Directive on file? yes    4) Are you interested in receiving information on Advance Directives? NO      Patient is accompanied by self I have received verbal consent from Nara Quinn to discuss any/all medical information while they are present in the room.

## 2017-11-02 NOTE — LETTER
11/22/2017 1:25 PM 
 
Ms. Michael Myrick [place] [id number] [facility] [phone] Dear Michael Myrick: 
 
Please find your most recent results below. Resulted Orders METABOLIC PANEL, BASIC Result Value Ref Range Glucose 100 (H) 65 - 99 mg/dL BUN 21 8 - 27 mg/dL Creatinine 0.76 0.57 - 1.00 mg/dL GFR est non-AA 70 >59 mL/min/1.73 GFR est AA 81 >59 mL/min/1.73  
 BUN/Creatinine ratio 28 12 - 28 Sodium 145 (H) 134 - 144 mmol/L Potassium 4.2 3.5 - 5.2 mmol/L Chloride 104 96 - 106 mmol/L  
 CO2 26 18 - 29 mmol/L Calcium 10.1 8.7 - 10.3 mg/dL Narrative Performed at:  [address] [address]  [id number] : Mona Marin MD, Phone:  [phone] CBC W/O DIFF Result Value Ref Range WBC 8.2 3.4 - 10.8 x10E3/uL  
 RBC 4.21 3.77 - 5.28 x10E6/uL HGB 13.2 11.1 - 15.9 g/dL HCT 40.2 34.0 - 46.6 % MCV 96 79 - 97 fL  
 MCH 31.4 26.6 - 33.0 pg  
 MCHC 32.8 31.5 - 35.7 g/dL  
 RDW 14.1 12.3 - 15.4 % PLATELET 700 642 - 278 x10E3/uL Narrative Performed at:  [address] [address]  [id number] : Mona Marin MD, Phone:  [phone] VITAMIN D, 25 HYDROXY Result Value Ref Range VITAMIN D, 25-HYDROXY 34.2 30.0 - 100.0 ng/mL Comment:  
   Vitamin D deficiency has been defined by the [facility] practice guideline as a 
level of serum 25-OH vitamin D less than 20 ng/mL (1,2). The Endocrine Society went on to further define vitamin D 
insufficiency as a level between 21 and 29 ng/mL (2). 1. IOM ([place] [employer]). 2010. Dietary reference 
   intakes for calcium and D. [facility]: The 
   TRA. 2. Aimee RUSSELL, Dashawn BOND, Aleks PINEDA, et al. 
   Evaluation, treatment, and prevention of vitamin D 
   deficiency: an Endocrine Society clinical practice guideline. JCEM. 2011 Jul; 96(7):1911-30. Narrative Performed at:  [address]  [id number] : Yvonne Hartman MD, Phone:  [phone] RECOMMENDATIONS: 
None. Keep up the good work! Please call me if you have any questions: [phone] Sincerely, 
 
 
Jonny Cameron, DO

## 2017-11-07 ENCOUNTER — HOSPITAL ENCOUNTER (OUTPATIENT)
Dept: LAB | Age: 82
Discharge: HOME | End: 2017-11-07
Payer: MEDICARE

## 2017-11-07 PROCEDURE — 85027 COMPLETE CBC AUTOMATED: CPT

## 2017-11-07 PROCEDURE — 80048 BASIC METABOLIC PNL TOTAL CA: CPT

## 2017-11-07 PROCEDURE — 82306 VITAMIN D 25 HYDROXY: CPT

## 2017-11-09 LAB
25(OH)D3+25(OH)D2 SERPL-MCNC: 34.2 NG/ML (ref 30–100)
BUN SERPL-MCNC: 21 MG/DL (ref 8–27)
BUN/CREAT SERPL: 28 (ref 12–28)
CALCIUM SERPL-MCNC: 10.1 MG/DL (ref 8.7–10.3)
CHLORIDE SERPL-SCNC: 104 MMOL/L (ref 96–106)
CO2 SERPL-SCNC: 26 MMOL/L (ref 18–29)
CREAT SERPL-MCNC: 0.76 MG/DL (ref 0.57–1)
ERYTHROCYTE [DISTWIDTH] IN BLOOD BY AUTOMATED COUNT: 14.1 % (ref 12.3–15.4)
GFR SERPLBLD CREATININE-BSD FMLA CKD-EPI: 70 ML/MIN/1.73
GFR SERPLBLD CREATININE-BSD FMLA CKD-EPI: 81 ML/MIN/1.73
GLUCOSE SERPL-MCNC: 100 MG/DL (ref 65–99)
HCT VFR BLD AUTO: 40.2 % (ref 34–46.6)
HGB BLD-MCNC: 13.2 G/DL (ref 11.1–15.9)
MCH RBC QN AUTO: 31.4 PG (ref 26.6–33)
MCHC RBC AUTO-ENTMCNC: 32.8 G/DL (ref 31.5–35.7)
MCV RBC AUTO: 96 FL (ref 79–97)
PLATELET # BLD AUTO: 215 X10E3/UL (ref 150–379)
POTASSIUM SERPL-SCNC: 4.2 MMOL/L (ref 3.5–5.2)
RBC # BLD AUTO: 4.21 X10E6/UL (ref 3.77–5.28)
SODIUM SERPL-SCNC: 145 MMOL/L (ref 134–144)
WBC # BLD AUTO: 8.2 X10E3/UL (ref 3.4–10.8)

## 2017-11-29 NOTE — PROGRESS NOTES
HISTORY OF PRESENT ILLNESS  Nitin Downey is a 80 y.o. female. Pt. comes in for f/u. Has multiple medical problems. She has bilateral arthritic knee pain. Has history of Baker's cyst which is worse on the right side. Gait is unsteady and slow but no falls. BP is stable. Memory is poor but seems stable. Reports compliance with medications and diet. Med list and most recent labs/studies reviewed with pt. Trying to be active physically as tolerated. Denies tobacco or alcohol use. Due for repeat labs. Reports no other new c/o. Cyst   Pertinent negatives include no chest pain, no abdominal pain, no headaches and no shortness of breath. Osteoporosis   Pertinent negatives include no chest pain, no abdominal pain, no headaches and no shortness of breath. Hypertension    Pertinent negatives include no chest pain, no blurred vision, no headaches, no dizziness and no shortness of breath. Review of Systems   Constitutional: Negative. HENT: Negative. Eyes: Negative for blurred vision. Respiratory: Negative for shortness of breath. Cardiovascular: Negative for chest pain and leg swelling. Gastrointestinal: Negative for abdominal pain. Genitourinary: Negative for dysuria. Musculoskeletal: Positive for joint pain. Negative for back pain and falls. Skin: Negative. Neurological: Negative for dizziness, sensory change and headaches. Psychiatric/Behavioral: Positive for memory loss. Negative for depression. The patient is not nervous/anxious and does not have insomnia. All other systems reviewed and are negative. Physical Exam   Constitutional: She is oriented to person, place, and time. She appears well-developed and well-nourished. No distress. HENT:   Head: Normocephalic and atraumatic. Mouth/Throat: Oropharynx is clear and moist.   Eyes: Conjunctivae are normal.   Neck: Normal range of motion. Neck supple. No JVD present. No thyromegaly present.    Cardiovascular: Normal rate, regular rhythm, normal heart sounds and intact distal pulses. No murmur heard. Pulmonary/Chest: Effort normal and breath sounds normal. No respiratory distress. She has no wheezes. She has no rales. Abdominal: Soft. Bowel sounds are normal. She exhibits no distension. Musculoskeletal: She exhibits tenderness (R knee and politeal area with baker's cyst). She exhibits no edema. DJD   Neurological: She is alert and oriented to person, place, and time. Coordination abnormal.   Skin: Skin is warm and dry. Psychiatric: She has a normal mood and affect. Her behavior is normal.   Nursing note and vitals reviewed. ASSESSMENT and PLAN  Diagnoses and all orders for this visit:    1. Essential hypertension  -     METABOLIC PANEL, BASIC  -     CBC W/O DIFF    2. Primary osteoarthritis of both knees    3. Synovial cyst of right popliteal space    4. Age related osteoporosis, unspecified pathological fracture presence  -     VITAMIN D, 25 HYDROXY      Follow-up Disposition:  Return in about 4 months (around 3/2/2018).    lab results and schedule of future lab studies reviewed with patient  reviewed diet, exercise and weight control  reviewed medications and side effects in detail  Reassurance  Tylenol as needed pain  Patient is not interested in seeing an orthopedic MD

## 2017-12-28 ENCOUNTER — OFFICE VISIT (OUTPATIENT)
Dept: INTERNAL MEDICINE CLINIC | Age: 82
End: 2017-12-28

## 2017-12-28 VITALS
BODY MASS INDEX: 27.68 KG/M2 | SYSTOLIC BLOOD PRESSURE: 145 MMHG | WEIGHT: 141 LBS | DIASTOLIC BLOOD PRESSURE: 75 MMHG | RESPIRATION RATE: 18 BRPM | OXYGEN SATURATION: 94 % | HEIGHT: 60 IN | HEART RATE: 82 BPM

## 2017-12-28 DIAGNOSIS — G89.29 CHRONIC PAIN OF RIGHT KNEE: ICD-10-CM

## 2017-12-28 DIAGNOSIS — R26.9 GAIT DIFFICULTY: ICD-10-CM

## 2017-12-28 DIAGNOSIS — M71.21 SYNOVIAL CYST OF RIGHT POPLITEAL SPACE: ICD-10-CM

## 2017-12-28 DIAGNOSIS — I10 ESSENTIAL HYPERTENSION: Primary | ICD-10-CM

## 2017-12-28 DIAGNOSIS — M25.561 CHRONIC PAIN OF RIGHT KNEE: ICD-10-CM

## 2017-12-28 NOTE — MR AVS SNAPSHOT
Visit Information Date & Time Provider Department Dept. Phone Encounter #  
 12/28/2017  9:30 AM Avery Vega [address] [id number] Follow-up Instructions Return in about 4 months (around 4/28/2018). Your Appointments 3/1/2018  9:00 AM  
Any with DO Caprice [place] ([facility]) Appt Note: follow up 4m [address] [facility]. [facility] [id number] [phone]  
  
   
 [address] [place] [address]. [address] [place] [phone] Upcoming Health Maintenance Date Due DTaP/Tdap/Td series (1 - Tdap) 8/15/1950 ZOSTER VACCINE AGE 60> 6/15/1989 Pneumococcal 65+ Low/Medium Risk (1 of 2 - PCV13) 8/15/1994 MEDICARE YEARLY EXAM 6/7/2018 GLAUCOMA SCREENING Q2Y 6/6/2019 Allergies as of 12/28/2017  Review Complete On: 12/28/2017 By: Avery Vega DO No Known Allergies Current Immunizations  Reviewed on 12/28/2017 Name Date Zoster Vaccine, Live  Deferred (Medication not available) Reviewed by Avery Vega DO on 12/28/2017 at  9:38 AM  
You Were Diagnosed With   
  
 Codes Comments Essential hypertension    -  Primary ICD-10-CM: I10 
ICD-9-CM: 401.9 Chronic pain of right knee     ICD-10-CM: M25.561, G89.29 ICD-9-CM: 719.46, 338.29 Synovial cyst of right popliteal space     ICD-10-CM: M71.21 
ICD-9-CM: 727.51 Gait difficulty     ICD-10-CM: R26.9 ICD-9-CM: 264. 2 Vitals BP Pulse Resp Height(growth percentile) Weight(growth percentile) SpO2  
 145/75 (BP 1 Location: Right arm, BP Patient Position: Sitting) 82 18 5' (1.524 m) 141 lb (64 kg) 94% BMI OB Status Smoking Status 27.54 kg/m2 Hysterectomy Never Smoker Vitals History BMI and BSA Data Body Mass Index Body Surface Area  
 27.54 kg/m 2 1.65 m 2 Preferred Pharmacy Pharmacy Name Phone [facility] [phone] Your Updated Medication List  
  
   
This list is accurate as of: 17  9:41 AM.  Always use your most recent med list.  
  
  
  
  
 acetaminophen 500 mg tablet Commonly known as:  TYLENOL Take 1 Tab by mouth three (3) times daily as needed for Pain. amLODIPine 5 mg tablet Commonly known as:  Esau Presser TAKE 1 TABLET BY MOUTH DAILY. Arm Brace Misc Commonly known as:  NEOPRENE WRIST SPLINT SUPPORT Use in both hands  
  
 ascorbic acid (vitamin C) 500 mg tablet Commonly known as:  VITAMIN C Take  by mouth.  
  
 calcium 500 mg Tab Take  by mouth. diclofenac 1 % Gel Commonly known as:  VOLTAREN Apply  to affected area four (4) times daily. folic acid 932 mcg tablet Take 800 mcg by mouth daily. Leg Brace Misc Commonly known as:  ACE KNEE BRACE  
by Does Not Apply route. Use on right knee  
  
 multivitamin tablet Commonly known as:  ONE A DAY Take 1 Tab by mouth daily. pneumococcal 13 noble conj dip 0.5 mL Syrg injection Commonly known as:  PREVNAR-13  
0.5 mL by IntraMUSCular route once for 1 dose. VITAMIN B-12 1,000 mcg tablet Generic drug:  cyanocobalamin Take 1,000 mcg by mouth daily. VITAMIN B-6 100 mg tablet Generic drug:  pyridoxine (vitamin B6) Take 100 mg by mouth daily. VITAMIN D3 1,000 unit tablet Generic drug:  cholecalciferol Take  by mouth daily. vitamin E 400 unit capsule Commonly known as:  [address] Take  by mouth daily. Prescriptions Printed Refills  
 pneumococcal 13 noble conj dip (PREVNAR-13) 0.5 mL syrg injection 0 Si.5 mL by IntraMUSCular route once for 1 dose. Class: Print Route: IntraMUSCular Follow-up Instructions Return in about 4 months (around 2018). Introducing [facility] & HEALTH SERVICES! Dear Bernardino Briceno: Thank you for requesting a Genm[employer] account. Our records indicate that you have previously registered for a Genmab account but its currently inactive. Please call our Genm[employer] support line at [phone]. Additional Information If you have questions, please visit the Frequently Asked Questions section of the Genmab website at [email or web address] Remember, Genmab is NOT to be used for urgent needs. For medical emergencies, dial 911. Now available from your iPhone and Android! Please provide this summary of care documentation to your next provider. Your primary care clinician is listed as Tra Franklin. If you have any questions after today's visit, please call [phone].

## 2017-12-28 NOTE — PROGRESS NOTES
HISTORY OF PRESENT ILLNESS  Nara Quinn is a 80 y.o. female. Pt. comes in for f/u. Has multiple medical problems. She has chronic bilateral arthritic knee pain. Has history of R Caputo's cyst.  Cortisone injection did not help much. Gait is unsteady and slow but no falls. BP is stable. Memory is poor but stable. Reports compliance with medications and diet. Med list and most recent labs/studies reviewed with pt. Trying to be active physically as tolerated. Denies tobacco or alcohol use. Reports no other new c/o. Cyst   Pertinent negatives include no chest pain, no abdominal pain, no headaches and no shortness of breath. Hypertension    Pertinent negatives include no chest pain, no blurred vision, no headaches, no dizziness and no shortness of breath. Osteoporosis   Pertinent negatives include no chest pain, no abdominal pain, no headaches and no shortness of breath. Follow Up Chronic Condition   Pertinent negatives include no chest pain, no abdominal pain, no headaches and no shortness of breath. Review of Systems   Constitutional: Negative. HENT: Negative. Eyes: Negative for blurred vision. Respiratory: Negative for shortness of breath. Cardiovascular: Negative for chest pain and leg swelling. Gastrointestinal: Negative for abdominal pain. Genitourinary: Negative for dysuria. Musculoskeletal: Positive for joint pain. Negative for back pain and falls. Skin: Negative. Neurological: Negative for dizziness, sensory change and headaches. Psychiatric/Behavioral: Positive for memory loss. Negative for depression. The patient is not nervous/anxious and does not have insomnia. All other systems reviewed and are negative. Physical Exam   Constitutional: She is oriented to person, place, and time. She appears well-developed and well-nourished. No distress. HENT:   Head: Normocephalic and atraumatic.    Mouth/Throat: Oropharynx is clear and moist.   Eyes: Conjunctivae are normal.   Neck: Normal range of motion. Neck supple. No JVD present. No thyromegaly present. Cardiovascular: Normal rate, regular rhythm, normal heart sounds and intact distal pulses. No murmur heard. Pulmonary/Chest: Effort normal and breath sounds normal. No respiratory distress. She has no wheezes. She has no rales. Abdominal: Soft. Bowel sounds are normal. She exhibits no distension. Musculoskeletal: She exhibits tenderness (R knee and politeal area with baker's cyst). She exhibits no edema. DJD   Neurological: She is alert and oriented to person, place, and time. Coordination abnormal.   Skin: Skin is warm and dry. Psychiatric: She has a normal mood and affect. Her behavior is normal.   Nursing note and vitals reviewed. ASSESSMENT and PLAN  Diagnoses and all orders for this visit:    1. Essential hypertension    2. Chronic pain of right knee    3. Synovial cyst of right popliteal space    4. Gait difficulty    Other orders  -     pneumococcal 13 noble conj dip (PREVNAR-13) 0.5 mL syrg injection; 0.5 mL by IntraMUSCular route once for 1 dose. Follow-up Disposition:  Return in about 4 months (around 4/28/2018).    lab results and schedule of future lab studies reviewed with patient  reviewed diet, exercise and weight control  reviewed medications and side effects in detail  Overall stable

## 2017-12-28 NOTE — PROGRESS NOTES
Health Maintenance Due   Topic Date Due    DTaP/Tdap/Td series (1 - Tdap) 08/15/1950    ZOSTER VACCINE AGE 60>  06/15/1989    Pneumococcal 65+ Low/Medium Risk (1 of 2 - PCV13) 08/15/1994       Chief Complaint   Patient presents with    Cyst     behind right knee    Hypertension    Osteoporosis    Follow Up Chronic Condition       1. Have you been to the ER, urgent care clinic since your last visit? Hospitalized since your last visit? No    2. Have you seen or consulted any other health care providers outside of the [address] [place] [address] since your last visit? Include any pap smears or colon screening. No    3) Do you have an Advance Directive on file? no    4) Are you interested in receiving information on Advance Directives? NO      Patient is accompanied by self I have received verbal consent from Cipriano Steinberg to discuss any/all medical information while they are present in the room.

## 2018-03-01 ENCOUNTER — OFFICE VISIT (OUTPATIENT)
Dept: INTERNAL MEDICINE CLINIC | Age: 83
End: 2018-03-01

## 2018-03-01 VITALS
HEART RATE: 77 BPM | WEIGHT: 140 LBS | HEIGHT: 60 IN | RESPIRATION RATE: 19 BRPM | OXYGEN SATURATION: 98 % | BODY MASS INDEX: 27.48 KG/M2 | DIASTOLIC BLOOD PRESSURE: 59 MMHG | SYSTOLIC BLOOD PRESSURE: 136 MMHG

## 2018-03-01 DIAGNOSIS — G89.29 CHRONIC PAIN OF RIGHT KNEE: ICD-10-CM

## 2018-03-01 DIAGNOSIS — I10 ESSENTIAL HYPERTENSION: Primary | ICD-10-CM

## 2018-03-01 DIAGNOSIS — R41.3 MEMORY IMPAIRMENT: ICD-10-CM

## 2018-03-01 DIAGNOSIS — M25.561 CHRONIC PAIN OF RIGHT KNEE: ICD-10-CM

## 2018-03-01 DIAGNOSIS — M71.21 SYNOVIAL CYST OF RIGHT POPLITEAL SPACE: ICD-10-CM

## 2018-03-01 DIAGNOSIS — R26.9 GAIT DIFFICULTY: ICD-10-CM

## 2018-03-01 NOTE — PROGRESS NOTES
HISTORY OF PRESENT ILLNESS  Nicolette Ricks is a 80 y.o. female. Pt. comes in for f/u. Has multiple medical problems. She has chronic bilateral arthritic knee pains with Baker cysts. Right worse than left. Gait is slow but no recent falls. Some memory impairment but overall stable. BP is stable. Reports compliance with medications and diet. Med list and most recent labs/studies reviewed with pt. Trying to be active physically as tolerated. No tobacco or alcohol use. Reports no other new c/o. Cyst   Pertinent negatives include no chest pain, no abdominal pain, no headaches and no shortness of breath. Memory Loss    Her past medical history is significant for hypertension. Hypertension    Pertinent negatives include no chest pain, no blurred vision, no headaches, no dizziness and no shortness of breath. Follow Up Chronic Condition   Pertinent negatives include no chest pain, no abdominal pain, no headaches and no shortness of breath. Review of Systems   Constitutional: Negative. HENT: Negative. Eyes: Negative for blurred vision. Respiratory: Negative for shortness of breath. Cardiovascular: Negative for chest pain and leg swelling. Gastrointestinal: Negative for abdominal pain. Genitourinary: Negative for dysuria. Musculoskeletal: Positive for joint pain. Negative for back pain and falls. Skin: Negative. Neurological: Negative for dizziness, sensory change and headaches. Psychiatric/Behavioral: Positive for memory loss. Negative for depression. The patient is not nervous/anxious and does not have insomnia. All other systems reviewed and are negative. Physical Exam   Constitutional: She is oriented to person, place, and time. She appears well-developed and well-nourished. No distress. HENT:   Head: Normocephalic and atraumatic. Mouth/Throat: Oropharynx is clear and moist.   Eyes: Conjunctivae are normal.   Neck: Normal range of motion. Neck supple. No JVD present. No thyromegaly present. Cardiovascular: Normal rate, regular rhythm, normal heart sounds and intact distal pulses. No murmur heard. Pulmonary/Chest: Effort normal and breath sounds normal. No respiratory distress. She has no wheezes. She has no rales. Abdominal: Soft. Bowel sounds are normal. She exhibits no distension. Musculoskeletal: She exhibits tenderness (R knee and politeal area with baker's cyst, small right Baker's cyst as well). She exhibits no edema. DJD   Neurological: She is alert and oriented to person, place, and time. Coordination abnormal.   Mild memory issues   Skin: Skin is warm and dry. No rash noted. Psychiatric: She has a normal mood and affect. Her behavior is normal.   Nursing note and vitals reviewed. ASSESSMENT and PLAN  Diagnoses and all orders for this visit:    1. Essential hypertension    2. Chronic pain of right knee    3. Synovial cyst of right popliteal space    4. Gait difficulty    5. Memory impairment      Follow-up Disposition:  Return in about 6 months (around 9/1/2018).    lab results and schedule of future lab studies reviewed with patient  reviewed diet, exercise and weight control  reviewed medications and side effects in detail  Reassurance  Overall stable

## 2018-03-01 NOTE — PROGRESS NOTES
Health Maintenance Due   Topic Date Due    DTaP/Tdap/Td series (1 - Tdap) 08/15/1950    ZOSTER VACCINE AGE 60>  06/15/1989       Chief Complaint   Patient presents with    Cyst     bilat knee    Memory Loss    Hypertension    Follow Up Chronic Condition       1. Have you been to the ER, urgent care clinic since your last visit? Hospitalized since your last visit? No    2. Have you seen or consulted any other health care providers outside of the [address] [place] [address] since your last visit? Include any pap smears or colon screening. No    3) Do you have an Advance Directive on file? no    4) Are you interested in receiving information on Advance Directives? NO      Patient is accompanied by self I have received verbal consent from Latricia Menon to discuss any/all medical information while they are present in the room.

## 2018-03-01 NOTE — MR AVS SNAPSHOT
[address] [place] [address]. [place] [id number] [phone] Patient: Baljit Aceves MRN:  Simran Tyrone Visit Information Date & Time Provider Department Dept. Phone Encounter #  
 3/1/2018  9:00 AM Cap[facility] Temple [phone] [id number] Follow-up Instructions Return in about 6 months (around 9/1/2018). Upcoming Health Maintenance Date Due DTaP/Tdap/Td series (1 - Tdap) 8/15/1950 ZOSTER VACCINE AGE 60> 6/15/1989 MEDICARE YEARLY EXAM 6/7/2018 Pneumococcal 65+ Low/Medium Risk (2 of 2 - PPSV23) 6/13/2018 GLAUCOMA SCREENING Q2Y 6/6/2019 Allergies as of 3/1/2018  Review Complete On: 3/1/2018 By: Antonio Fox DO No Known Allergies Current Immunizations  Reviewed on 3/1/2018 Name Date Pneumococcal Conjugate (PCV-13) 6/13/2017 Zoster Vaccine, Live  Deferred (Medication not available) Reviewed by Antonio Fox DO on 3/1/2018 at  9:24 AM  
You Were Diagnosed With   
  
 Codes Comments Essential hypertension    -  Primary ICD-10-CM: I10 
ICD-9-CM: 401.9 Chronic pain of right knee     ICD-10-CM: M25.561, G89.29 ICD-9-CM: 719.46, 338.29 Synovial cyst of right popliteal space     ICD-10-CM: M71.21 
ICD-9-CM: 727.51 Gait difficulty     ICD-10-CM: R26.9 ICD-9-CM: 737. 2 Memory impairment     ICD-10-CM: R41.3 ICD-9-CM: 780.93 Vitals BP Pulse Resp Height(growth percentile) Weight(growth percentile) SpO2  
 136/59 (BP 1 Location: Right arm, BP Patient Position: Sitting) 77 19 5' (1.524 m) 140 lb (63.5 kg) 98% BMI OB Status Smoking Status 27.34 kg/m2 Hysterectomy Never Smoker Vitals History BMI and BSA Data Body Mass Index Body Surface Area  
 27.34 kg/m 2 1.64 m 2 Preferred Pharmacy Pharmacy Name Phone [facility]. [phone] Your Updated Medication List  
  
   
This list is accurate as of 3/1/18  9:25 AM.  Always use your most recent med list.  
  
  
  
  
 acetaminophen 500 mg tablet Commonly known as:  TYLENOL Take 1 Tab by mouth three (3) times daily as needed for Pain. amLODIPine 5 mg tablet Commonly known as:  Jillyn Boast TAKE 1 TABLET BY MOUTH DAILY. Arm Brace Misc Commonly known as:  NEOPRENE WRIST SPLINT SUPPORT Use in both hands  
  
 ascorbic acid (vitamin C) 500 mg tablet Commonly known as:  VITAMIN C Take  by mouth.  
  
 calcium 500 mg Tab Take  by mouth. diclofenac 1 % Gel Commonly known as:  VOLTAREN Apply  to affected area four (4) times daily. folic acid 846 mcg tablet Take 800 mcg by mouth daily. Leg Brace Misc Commonly known as:  ACE KNEE BRACE  
by Does Not Apply route. Use on right knee  
  
 multivitamin tablet Commonly known as:  ONE A DAY Take 1 Tab by mouth daily. VITAMIN B-12 1,000 mcg tablet Generic drug:  cyanocobalamin Take 1,000 mcg by mouth daily. VITAMIN B-6 100 mg tablet Generic drug:  pyridoxine (vitamin B6) Take 100 mg by mouth daily. VITAMIN D3 1,000 unit tablet Generic drug:  cholecalciferol Take  by mouth daily. vitamin E 400 unit capsule Commonly known as:  [address] Take  by mouth daily. Follow-up Instructions Return in about 6 months (around 9/1/2018). Introducing [facility] & HEALTH SERVICES! Dear Linh Maya: Thank you for requesting a Fyre[employer] account. Our records indicate that you have previously registered for a Fyreball account but its currently inactive. Please call our Fyreball support line at [phone]. Additional Information If you have questions, please visit the Frequently Asked Questions section of the Fyreball website at [email or web address] Remember, Fyreball is NOT to be used for urgent needs. For medical emergencies, dial 911. Now available from your iPhone and Android! Please provide this summary of care documentation to your next provider. Your primary care clinician is listed as Kiana Matos. If you have any questions after today's visit, please call [phone].

## 2018-04-05 ENCOUNTER — OFFICE VISIT (OUTPATIENT)
Dept: INTERNAL MEDICINE CLINIC | Age: 83
End: 2018-04-05

## 2018-04-05 VITALS
HEIGHT: 60 IN | WEIGHT: 139.8 LBS | DIASTOLIC BLOOD PRESSURE: 78 MMHG | BODY MASS INDEX: 27.45 KG/M2 | OXYGEN SATURATION: 94 % | TEMPERATURE: 98 F | SYSTOLIC BLOOD PRESSURE: 150 MMHG | RESPIRATION RATE: 18 BRPM | HEART RATE: 78 BPM

## 2018-04-05 DIAGNOSIS — R41.3 MEMORY IMPAIRMENT: ICD-10-CM

## 2018-04-05 DIAGNOSIS — I10 ESSENTIAL HYPERTENSION: ICD-10-CM

## 2018-04-05 DIAGNOSIS — R20.0 BILATERAL FINGER NUMBNESS: Primary | ICD-10-CM

## 2018-04-05 DIAGNOSIS — R26.9 GAIT DIFFICULTY: ICD-10-CM

## 2018-04-05 RX ORDER — GABAPENTIN 100 MG/1
100 CAPSULE ORAL
Qty: 30 CAP | Refills: 1 | Status: SHIPPED | OUTPATIENT
Start: 2018-04-05 | End: 2018-06-04 | Stop reason: SDUPTHER

## 2018-04-05 RX ORDER — AMLODIPINE BESYLATE 5 MG/1
TABLET ORAL
Qty: 30 TAB | Refills: 5 | Status: SHIPPED | OUTPATIENT
Start: 2018-04-05

## 2018-04-05 NOTE — PROGRESS NOTES
HISTORY OF PRESENT ILLNESS  Lyssa Morrison is a 80 y.o. female. Pt. comes in for f/u. Has multiple medical problems. Reports numbness and tingling in her fingers for a while now. No other focal neurological issues. BP is stable. Has chronic knee issues with arthritis and Baker's cyst.  Unsteady gait but no falls. Memory is poor. Reports compliance with medications and diet. Med list and most recent labs/studies reviewed with pt. Trying to be active physically as tolerated. Reports no other new c/o. Numbness   Primary symptoms include memory loss. Pertinent negatives include no focal weakness. Pertinent negatives include no shortness of breath, no chest pain and no headaches. Hypertension    Pertinent negatives include no chest pain, no blurred vision, no headaches, no dizziness and no shortness of breath. Follow Up Chronic Condition   Pertinent negatives include no chest pain, no abdominal pain, no headaches and no shortness of breath. Review of Systems   Constitutional: Negative. HENT: Negative. Eyes: Negative for blurred vision. Respiratory: Negative for shortness of breath. Cardiovascular: Negative for chest pain and leg swelling. Gastrointestinal: Negative for abdominal pain. Genitourinary: Negative for dysuria. Musculoskeletal: Positive for joint pain. Negative for back pain and falls. Skin: Negative. Neurological: Positive for tingling, sensory change and numbness. Negative for dizziness, focal weakness and headaches. Psychiatric/Behavioral: Positive for memory loss. Negative for depression. The patient is not nervous/anxious and does not have insomnia. All other systems reviewed and are negative. Physical Exam   Constitutional: She is oriented to person, place, and time. She appears well-developed and well-nourished. No distress. HENT:   Head: Normocephalic and atraumatic.    Mouth/Throat: Oropharynx is clear and moist.   Eyes: Conjunctivae are normal.   Neck: Normal range of motion. Neck supple. No JVD present. No thyromegaly present. Cardiovascular: Normal rate, regular rhythm, normal heart sounds and intact distal pulses. No murmur heard. Pulmonary/Chest: Effort normal and breath sounds normal. No respiratory distress. She has no wheezes. She has no rales. Abdominal: Soft. Bowel sounds are normal. She exhibits no distension. Musculoskeletal: She exhibits tenderness (R knee and politeal area with baker's cyst, small right Baker's cyst as well, chronic). She exhibits no edema. DJD   Neurological: She is alert and oriented to person, place, and time. Coordination abnormal.   Mild memory issues   Skin: Skin is warm and dry. No rash noted. Psychiatric: She has a normal mood and affect. Her behavior is normal.   Nursing note and vitals reviewed. ASSESSMENT and PLAN  Diagnoses and all orders for this visit:    1. Bilateral finger numbness    2. Essential hypertension  -     amLODIPine (NORVASC) 5 mg tablet; TAKE 1 TABLET BY MOUTH DAILY. 3. Gait difficulty    4. Memory impairment    Other orders  -     gabapentin (NEURONTIN) 100 mg capsule; Take 1 Cap by mouth nightly. Follow-up Disposition:  Return in about 1 month (around 5/5/2018).    lab results and schedule of future lab studies reviewed with patient  reviewed diet, exercise and weight control  reviewed medications and side effects in detail  Discussed importance of taking medications as directed  Discussed possible etiologies of tingling in fingers with treatment options

## 2018-04-05 NOTE — MR AVS SNAPSHOT
[facility] 
 
 
 [facility] [address] 
[phone] Patient: Yaidra Quiroz MRN:  Yoko Zuniga Visit Information Date & Time Provider Department Dept. Phone Encounter #  
 4/5/2018 10:30 AM Ashwini Chung [address] [id number] Follow-up Instructions Return in about 1 month (around 5/5/2018). Your Appointments 9/6/2018  9:00 AM  
Any with DO Caprice Cabrera ([facility]) Appt Note: follow up [address] [place] [address]. A [facility] 7 [id number] [phone]  
  
   
 [place] [address] [place] [id number] Upcoming Health Maintenance Date Due DTaP/Tdap/Td series (1 - Tdap) 8/15/1950 ZOSTER VACCINE AGE 60> 6/15/1989 MEDICARE YEARLY EXAM 6/7/2018 Pneumococcal 65+ Low/Medium Risk (2 of 2 - PPSV23) 6/13/2018 GLAUCOMA SCREENING Q2Y 6/6/2019 Allergies as of 4/5/2018  Review Complete On: 4/5/2018 By: Ashwini Chung DO No Known Allergies Current Immunizations  Reviewed on 3/1/2018 Name Date Pneumococcal Conjugate (PCV-13) 6/13/2017 Zoster Vaccine, Live  Deferred (Medication not available) Not reviewed this visit You Were Diagnosed With   
  
 Codes Comments Bilateral finger numbness    -  Primary ICD-10-CM: R20.0 ICD-9-CM: 782.0 Essential hypertension     ICD-10-CM: I10 
ICD-9-CM: 401.9 Gait difficulty     ICD-10-CM: R26.9 ICD-9-CM: 053. 2 Memory impairment     ICD-10-CM: R41.3 ICD-9-CM: 780.93 Vitals BP Pulse Temp Resp Height(growth percentile) Weight(growth percentile) 150/78 (BP 1 Location: Right arm, BP Patient Position: Sitting) 78 98 °F (36.7 °C) (Oral) 18 5' (1.524 m) 139 lb 12.8 oz (63.4 kg) SpO2 BMI OB Status Smoking Status 94% 27.3 kg/m2 Hysterectomy Never Smoker Vitals History BMI and BSA Data Body Mass Index Body Surface Area  
 27.3 kg/m 2 1.64 m 2 Preferred Pharmacy Pharmacy Name Phone [facility]. [phone] Your Updated Medication List  
  
   
This list is accurate as of 4/5/18 11:03 AM.  Always use your most recent med list.  
  
  
  
  
 acetaminophen 500 mg tablet Commonly known as:  TYLENOL Take 1 Tab by mouth three (3) times daily as needed for Pain. amLODIPine 5 mg tablet Commonly known as:  G[place] Inks TAKE 1 TABLET BY MOUTH DAILY. Arm Brace Misc Commonly known as:  NEOPRENE WRIST SPLINT SUPPORT Use in both hands  
  
 ascorbic acid (vitamin C) 500 mg tablet Commonly known as:  VITAMIN C Take  by mouth.  
  
 calcium 500 mg Tab Take  by mouth. diclofenac 1 % Gel Commonly known as:  VOLTAREN Apply  to affected area four (4) times daily. folic acid 206 mcg tablet Take 800 mcg by mouth daily. gabapentin 100 mg capsule Commonly known as:  NEURONTIN Take 1 Cap by mouth nightly. Leg Brace Misc Commonly known as:  ACE KNEE BRACE  
by Does Not Apply route. Use on right knee  
  
 multivitamin tablet Commonly known as:  ONE A DAY Take 1 Tab by mouth daily. VITAMIN B-12 1,000 mcg tablet Generic drug:  cyanocobalamin Take 1,000 mcg by mouth daily. VITAMIN B-6 100 mg tablet Generic drug:  pyridoxine (vitamin B6) Take 100 mg by mouth daily. VITAMIN D3 1,000 unit tablet Generic drug:  cholecalciferol Take  by mouth daily. vitamin E 400 unit capsule Commonly known as:  [address] Take  by mouth daily. Prescriptions Sent to Pharmacy Refills  
 gabapentin (NEURONTIN) 100 mg capsule 1 Sig: Take 1 Cap by mouth nightly. Class: Normal  
 Pharmacy: [facility] Aiyana Tolbert Ph #: [phone]  Route: Oral  
 amLODIPine (NORVASC) 5 mg tablet 5  
 Sig: TAKE 1 TABLET BY MOUTH DAILY. Class: Normal  
 Pharmacy: [id number]iy[address] Tolbert  #: [phone] Follow-up Instructions Return in about 1 month (around 5/5/2018). Introducing [facility] HE[facility]! [facility] introduces eCullet patient portal. Now you can access parts of your medical record, email your doctor's office, and request medication refills online. 1. In your internet browser, go to [email or web address] 2. Click on the First Time User? Click Here link in the Sign In box. You will see the New Member Sign Up page. 3. Enter your eCullet Access Code exactly as it appears below. You will not need to use this code after youve completed the sign-up process. If you do not sign up before the expiration date, you must request a new code. · eCullet Access Code: [email or web address] Expires: 7/4/2018 10:56 AM 
 
4. Enter the last four digits of your Social Security Number (xxxx) and Date of Birth (mm/dd/yyyy) as indicated and click Submit. You will be taken to the next sign-up page. 5. Create a eCullet ID. This will be your eCullet login ID and cannot be changed, so think of one that is secure and easy to remember. 6. Create a eCullet password. You can change your password at any time. 7. Enter your Password Reset Question and Answer. This can be used at a later time if you forget your password. 8. Enter your e-mail address. You will receive e-mail notification when new information is available in [address] 9. Click Sign Up. You can now view and download portions of your medical record. 10. Click the Download Summary menu link to download a portable copy of your medical information. If you have questions, please visit the Frequently Asked Questions section of the eCullet website. Remember, eCullet is NOT to be used for urgent needs. For medical emergencies, dial 911. Now available from your iPhone and Android! Please provide this summary of care documentation to your next provider. Your primary care clinician is listed as Nina Fowler. If you have any questions after today's visit, please call [phone].

## 2018-04-05 NOTE — PROGRESS NOTES
Health Maintenance Due   Topic Date Due    DTaP/Tdap/Td series (1 - Tdap) 08/15/1950    ZOSTER VACCINE AGE 60>  06/15/1989       Chief Complaint   Patient presents with    Numbness     tingling in fingers       1. Have you been to the ER, urgent care clinic since your last visit? Hospitalized since your last visit? No    2. Have you seen or consulted any other health care providers outside of the [facility] since your last visit? Include any pap smears or colon screening. No    3) Do you have an Advance Directive on file? no    4) Are you interested in receiving information on Advance Directives? NO      Patient is accompanied by self I have received verbal consent from Haely Ortega to discuss any/all medical information while they are present in the room.

## 2018-05-03 ENCOUNTER — OFFICE VISIT (OUTPATIENT)
Dept: INTERNAL MEDICINE CLINIC | Age: 83
End: 2018-05-03

## 2018-05-03 VITALS
OXYGEN SATURATION: 98 % | HEIGHT: 60 IN | HEART RATE: 94 BPM | RESPIRATION RATE: 19 BRPM | DIASTOLIC BLOOD PRESSURE: 66 MMHG | SYSTOLIC BLOOD PRESSURE: 134 MMHG | WEIGHT: 140 LBS | BODY MASS INDEX: 27.48 KG/M2

## 2018-05-03 DIAGNOSIS — M71.21 SYNOVIAL CYST OF RIGHT POPLITEAL SPACE: ICD-10-CM

## 2018-05-03 DIAGNOSIS — M17.0 PRIMARY OSTEOARTHRITIS OF BOTH KNEES: ICD-10-CM

## 2018-05-03 DIAGNOSIS — R20.0 BILATERAL FINGER NUMBNESS: ICD-10-CM

## 2018-05-03 DIAGNOSIS — R26.9 GAIT DIFFICULTY: ICD-10-CM

## 2018-05-03 DIAGNOSIS — I10 ESSENTIAL HYPERTENSION: Primary | ICD-10-CM

## 2018-05-03 NOTE — PROGRESS NOTES
HISTORY OF PRESENT ILLNESS  Barrett Bragg is a 80 y.o. female. Pt. comes in for f/u. Has multiple medical problems. BP is stable. Continues to have arthritic pain in knees. Has Baker's cyst as well. Memory is poor but stable. Lives in independent living. Gait is unsteady but no falls. Reports compliance with medications and diet. Med list and most recent labs/studies reviewed with pt. Trying to be active physically as tolerated. Reports no other new c/o. Hypertension    Pertinent negatives include no chest pain, no blurred vision, no headaches, no dizziness and no shortness of breath. Knee Pain   Pertinent negatives include no chest pain, no abdominal pain, no headaches and no shortness of breath. Memory Loss    Associated symptoms include tingling. Her past medical history is significant for hypertension. Cyst   Pertinent negatives include no chest pain, no abdominal pain, no headaches and no shortness of breath. Review of Systems   Constitutional: Negative. HENT: Negative. Eyes: Negative for blurred vision. Respiratory: Negative for shortness of breath. Cardiovascular: Negative for chest pain and leg swelling. Gastrointestinal: Negative for abdominal pain. Genitourinary: Negative for dysuria. Musculoskeletal: Positive for joint pain. Negative for back pain and falls. Skin: Negative. Neurological: Positive for tingling and sensory change. Negative for dizziness, focal weakness and headaches. Psychiatric/Behavioral: Positive for memory loss. Negative for depression. The patient is not nervous/anxious and does not have insomnia. All other systems reviewed and are negative. Physical Exam   Constitutional: She is oriented to person, place, and time. She appears well-developed and well-nourished. No distress. HENT:   Head: Normocephalic and atraumatic. Mouth/Throat: Oropharynx is clear and moist.   Eyes: Conjunctivae are normal.   Neck: Normal range of motion.  Neck supple. No JVD present. No thyromegaly present. Cardiovascular: Normal rate, regular rhythm, normal heart sounds and intact distal pulses. No murmur heard. Pulmonary/Chest: Effort normal and breath sounds normal. No respiratory distress. She has no wheezes. She has no rales. Abdominal: Soft. Bowel sounds are normal. She exhibits no distension. Musculoskeletal: She exhibits tenderness (L knee and politeal area with baker's cyst, small right Baker's cyst as well, chronic). She exhibits no edema. DJD   Neurological: She is alert and oriented to person, place, and time. No cranial nerve deficit. Coordination abnormal.   Mild memory issues   Skin: Skin is warm and dry. No rash noted. Psychiatric: She has a normal mood and affect. Her behavior is normal.   Nursing note and vitals reviewed. ASSESSMENT and PLAN  Diagnoses and all orders for this visit:    1. Essential hypertension    2. Bilateral finger numbness    3. Primary osteoarthritis of both knees    4. Gait difficulty    5. Synovial cyst of right popliteal space      Follow-up Disposition:  Return in about 4 months (around 9/3/2018).    lab results and schedule of future lab studies reviewed with patient  reviewed diet, exercise and weight control  reviewed medications and side effects in detail  Reassurance  Overall stable

## 2018-05-03 NOTE — MR AVS SNAPSHOT
[facility] 
 
 
 [address] [place] [address]. A [facility] 7 [address] 
[phone] Patient: Andrés Terry MRN:  Adilson Faustin Visit Information Date & Time Provider Department Dept. Phone Encounter #  
 5/3/2018 10:45 AM Margoth Monet [phone] [id number] Follow-up Instructions Return in about 4 months (around 9/3/2018). Your Appointments 9/6/2018  9:00 AM  
Any with Flori Gibbs DO Margoth Eric Irvin ([address]) Appt Note: follow up [address] [place] [address]. A [place] [id number] [phone]  
  
   
 [address] [place] [address]. [address] [place] [phone] Upcoming Health Maintenance Date Due DTaP/Tdap/Td series (1 - Tdap) 8/15/1950 ZOSTER VACCINE AGE 60> 6/15/1989 MEDICARE YEARLY EXAM 6/7/2018 Pneumococcal 65+ Low/Medium Risk (2 of 2 - PPSV23) 6/13/2018 Influenza Age 5 to Adult 8/1/2018 GLAUCOMA SCREENING Q2Y 6/6/2019 Allergies as of 5/3/2018  Review Complete On: 5/3/2018 By: Flori Gibbs DO No Known Allergies Current Immunizations  Reviewed on 3/1/2018 Name Date Pneumococcal Conjugate (PCV-13) 6/13/2017 Zoster Vaccine, Live  Deferred (Medication not available) Not reviewed this visit You Were Diagnosed With   
  
 Codes Comments Essential hypertension    -  Primary ICD-10-CM: I10 
ICD-9-CM: 401.9 Bilateral finger numbness     ICD-10-CM: R20.0 ICD-9-CM: 782.0 Primary osteoarthritis of both knees     ICD-10-CM: M17.0 ICD-9-CM: 715.16 Gait difficulty     ICD-10-CM: R26.9 ICD-9-CM: 585. 2 Synovial cyst of right popliteal space     ICD-10-CM: M71.21 
ICD-9-CM: 727.51 Vitals BP Pulse Resp Height(growth percentile) Weight(growth percentile) SpO2  
 134/66 (BP 1 Location: Left arm, BP Patient Position: Sitting) 94 19 5' (1.524 m) 140 lb (63.5 kg) 98% BMI OB Status Smoking Status 27.34 kg/m2 Hysterectomy Never Smoker Vitals History BMI and BSA Data Body Mass Index Body Surface Area  
 27.34 kg/m 2 1.64 m 2 Preferred Pharmacy Pharmacy Name Phone [id number]- Janneth Zaidi, [address] [place] [phone] Your Updated Medication List  
  
   
This list is accurate as of 5/3/18 10:55 AM.  Always use your most recent med list.  
  
  
  
  
 acetaminophen 500 mg tablet Commonly known as:  TYLENOL Take 1 Tab by mouth three (3) times daily as needed for Pain. amLODIPine 5 mg tablet Commonly known as:  Amber Grebe TAKE 1 TABLET BY MOUTH DAILY. Arm Brace Misc Commonly known as:  NEOPRENE WRIST SPLINT SUPPORT Use in both hands  
  
 ascorbic acid (vitamin C) 500 mg tablet Commonly known as:  VITAMIN C Take  by mouth.  
  
 calcium 500 mg Tab Take  by mouth. diclofenac 1 % Gel Commonly known as:  VOLTAREN Apply  to affected area four (4) times daily. folic acid 894 mcg tablet Take 800 mcg by mouth daily. gabapentin 100 mg capsule Commonly known as:  NEURONTIN Take 1 Cap by mouth nightly. Leg Brace Misc Commonly known as:  ACE KNEE BRACE  
by Does Not Apply route. Use on right knee  
  
 multivitamin tablet Commonly known as:  ONE A DAY Take 1 Tab by mouth daily. VITAMIN B-12 1,000 mcg tablet Generic drug:  cyanocobalamin Take 1,000 mcg by mouth daily. VITAMIN B-6 100 mg tablet Generic drug:  pyridoxine (vitamin B6) Take 100 mg by mouth daily. VITAMIN D3 1,000 unit tablet Generic drug:  cholecalciferol Take  by mouth daily. vitamin E 400 unit capsule Commonly known as:  [address] Take  by mouth daily. Follow-up Instructions Return in about 4 months (around 9/3/2018). Introducing [facility]!    
 José Estrada introduces [email or web address] patient portal. Now you can access parts of your medical record, email your doctor's office, and request medication refills online. 1. In your internet browser, go to [email or web address] 2. Click on the First Time User? Click Here link in the Sign In box. You will see the New Member Sign Up page. 3. Enter your Amtec Access Code exactly as it appears below. You will not need to use this code after youve completed the sign-up process. If you do not sign up before the expiration date, you must request a new code. · Amtec Access Code: [email or web address] Expires: 7/4/2018 10:56 AM 
 
4. Enter the last four digits of your Social Security Number (xxxx) and Date of Birth (mm/dd/yyyy) as indicated and click Submit. You will be taken to the next sign-up page. 5. Create a Amtec ID. This will be your Amtec login ID and cannot be changed, so think of one that is secure and easy to remember. 6. Create a Amtec password. You can change your password at any time. 7. Enter your Password Reset Question and Answer. This can be used at a later time if you forget your password. 8. Enter your e-mail address. You will receive e-mail notification when new information is available in [address] 9. Click Sign Up. You can now view and download portions of your medical record. 10. Click the Download Summary menu link to download a portable copy of your medical information. If you have questions, please visit the Frequently Asked Questions section of the Amtec website. Remember, Amtec is NOT to be used for urgent needs. For medical emergencies, dial 911. Now available from your iPhone and Android! Please provide this summary of care documentation to your next provider. Your primary care clinician is listed as Mian Frausto. If you have any questions after today's visit, please call [phone].

## 2018-07-19 ENCOUNTER — OFFICE VISIT (OUTPATIENT)
Dept: INTERNAL MEDICINE CLINIC | Age: 83
End: 2018-07-19

## 2018-07-19 VITALS
BODY MASS INDEX: 27.41 KG/M2 | DIASTOLIC BLOOD PRESSURE: 74 MMHG | TEMPERATURE: 98.6 F | RESPIRATION RATE: 18 BRPM | HEART RATE: 88 BPM | WEIGHT: 139.6 LBS | OXYGEN SATURATION: 94 % | SYSTOLIC BLOOD PRESSURE: 154 MMHG | HEIGHT: 60 IN

## 2018-07-19 DIAGNOSIS — M81.0 AGE RELATED OSTEOPOROSIS, UNSPECIFIED PATHOLOGICAL FRACTURE PRESENCE: ICD-10-CM

## 2018-07-19 DIAGNOSIS — R26.9 GAIT DIFFICULTY: ICD-10-CM

## 2018-07-19 DIAGNOSIS — Z71.89 ACP (ADVANCE CARE PLANNING): ICD-10-CM

## 2018-07-19 DIAGNOSIS — M54.2 NECK PAIN: ICD-10-CM

## 2018-07-19 DIAGNOSIS — I10 ESSENTIAL HYPERTENSION: Primary | ICD-10-CM

## 2018-07-19 DIAGNOSIS — R20.0 BILATERAL FINGER NUMBNESS: ICD-10-CM

## 2018-07-19 NOTE — PROGRESS NOTES
HISTORY OF PRESENT ILLNESS  Shelly Mcgraw is a 80 y.o. female. Pt. comes in for f/u. Has multiple medical problems. Reports having some discomfort and cracking in the right side of neck last night. Feeling fine now. No trauma. Has osteoarthritis and osteoporosis. Gait is unsteady but no falls. Poor historian. BP is stable. Reports compliance with medications and diet. Med list and most recent labs/studies reviewed with pt. Trying to be active physically as tolerated . Due for repeat labs. ACP discussed. Daughter is POA. She lives out of town. Denies tobacco or alcohol use. Reports no other new c/o. Neck Pain   Pertinent negatives include no chest pain, no abdominal pain, no headaches and no shortness of breath. Hypertension    Associated symptoms include neck pain. Pertinent negatives include no chest pain, no blurred vision, no headaches, no dizziness and no shortness of breath. Arthritis   Pertinent negatives include no chest pain, no abdominal pain, no headaches and no shortness of breath. Review of Systems   Constitutional: Negative. HENT: Negative. Eyes: Negative for blurred vision. Respiratory: Negative for shortness of breath. Cardiovascular: Negative for chest pain and leg swelling. Gastrointestinal: Negative for abdominal pain. Genitourinary: Negative for dysuria. Musculoskeletal: Positive for joint pain and neck pain. Negative for back pain and falls. Skin: Negative. Neurological: Positive for tingling and sensory change. Negative for dizziness, focal weakness and headaches. Psychiatric/Behavioral: Positive for memory loss. Negative for depression. The patient is not nervous/anxious and does not have insomnia. All other systems reviewed and are negative. Physical Exam   Constitutional: She is oriented to person, place, and time. She appears well-developed and well-nourished. No distress. HENT:   Head: Normocephalic and atraumatic.    Mouth/Throat: Oropharynx is clear and moist.   Eyes: Conjunctivae are normal.   Neck: Normal range of motion. Neck supple. No JVD present. No thyromegaly present. Cardiovascular: Normal rate, regular rhythm, normal heart sounds and intact distal pulses. No murmur heard. Pulmonary/Chest: Effort normal and breath sounds normal. No respiratory distress. She has no wheezes. She has no rales. Abdominal: Soft. Bowel sounds are normal. She exhibits no distension. Musculoskeletal: She exhibits tenderness (L knee with baker's cyst, chronic). She exhibits no edema. DJD   Neurological: She is alert and oriented to person, place, and time. No cranial nerve deficit. Coordination abnormal.   Mild memory issues   Skin: Skin is warm and dry. No rash noted. Psychiatric: She has a normal mood and affect. Her behavior is normal.   Nursing note and vitals reviewed. ASSESSMENT and PLAN  Diagnoses and all orders for this visit:    1. Essential hypertension  -     METABOLIC PANEL, BASIC    2. Neck pain    3. Age related osteoporosis, unspecified pathological fracture presence    4. Gait difficulty    5. Bilateral finger numbness    6. ACP (advance care planning)      Follow-up Disposition:  Return in about 4 months (around 11/19/2018).    lab results and schedule of future lab studies reviewed with patient  reviewed diet, exercise and weight control  reviewed medications and side effects in detail  Reassurance  Overall stable

## 2018-07-19 NOTE — PROGRESS NOTES
Health Maintenance Due   Topic Date Due    DTaP/Tdap/Td series (1 - Tdap) 08/15/1950    ZOSTER VACCINE AGE 60>  06/15/1989    MEDICARE YEARLY EXAM  06/07/2018    Pneumococcal 65+ Low/Medium Risk (2 of 2 - PPSV23) 06/13/2018       Chief Complaint   Patient presents with    Osteoporosis     Bones Cracking sounds    Hypertension       1. Have you been to the ER, urgent care clinic since your last visit? Hospitalized since your last visit? No    2. Have you seen or consulted any other health care providers outside of the [address] [place] [address] since your last visit? Include any pap smears or colon screening. No    3) Do you have an Advance Directive on file? no    4) Are you interested in receiving information on Advance Directives? NO      Patient is accompanied by self I have received verbal consent from [facility] to discuss any/all medical information while they are present in the room.

## 2018-07-19 NOTE — MR AVS SNAPSHOT
[address]. [facility] [place] [address] [phone] 
[phone] Patient: Ze York MRN:  Sadaf Villa Visit Information Date & Time Provider Department Dept. Phone Encounter #  
 7/19/2018 10:00 AM Larry Chapin, 227 [facility] [phone] [id number] Follow-up Instructions Return in about 4 months (around 11/19/2018). Your Appointments 9/6/2018  9:00 AM  
Any with DO Caprice Swenson ([address]) Appt Note: follow up 6m [address] [place] [address]. [facility] [phone]  
[phone]  
  
   
 [address] [place] [address]. [address] [place] [id number]  
  
    
 9/6/2018 10:00 AM  
Any with DO Caprice Swenson ([address]) Appt Note: follow up 4m [address] [place] [address]. [facility] [place] [address] [id number] [phone] Upcoming Health Maintenance Date Due DTaP/Tdap/Td series (1 - Tdap) 8/15/1950 ZOSTER VACCINE AGE 60> 6/15/1989 MEDICARE YEARLY EXAM 6/7/2018 Pneumococcal 65+ Low/Medium Risk (2 of 2 - PPSV23) 6/13/2018 Influenza Age 5 to Adult 8/1/2018 GLAUCOMA SCREENING Q2Y 6/6/2019 Allergies as of 7/19/2018  Review Complete On: 7/19/2018 By: Larry Chapin DO No Known Allergies Current Immunizations  Reviewed on 3/1/2018 Name Date Pneumococcal Conjugate (PCV-13) 6/13/2017 Zoster Vaccine, Live  Deferred (Medication not available) Not reviewed this visit You Were Diagnosed With   
  
 Codes Comments Essential hypertension    -  Primary ICD-10-CM: I10 
ICD-9-CM: 401.9 Neck pain     ICD-10-CM: M54.2 ICD-9-CM: 723.1 Age related osteoporosis, unspecified pathological fracture presence     ICD-10-CM: M81.0 ICD-9-CM: 733.01 Gait difficulty     ICD-10-CM: R26.9 ICD-9-CM: 781.2 Bilateral finger numbness     ICD-10-CM: R20.0 ICD-9-CM: 782.0 ACP (advance care planning)     ICD-10-CM: Z71.89 ICD-9-CM: V65.49 Vitals BP Pulse Temp Resp Height(growth percentile) Weight(growth percentile) 154/74 (BP 1 Location: Right arm, BP Patient Position: Sitting) 88 98.6 °F (37 °C) 18 5' (1.524 m) 139 lb 9.6 oz (63.3 kg) SpO2 BMI OB Status Smoking Status 94% 27.26 kg/m2 Hysterectomy Never Smoker Vitals History BMI and BSA Data Body Mass Index Body Surface Area  
 27.26 kg/m 2 1.64 m 2 Preferred Pharmacy Pharmacy Name Phone [id number] [place], [place] [phone] Your Updated Medication List  
  
   
This list is accurate as of 7/19/18 10:20 AM.  Always use your most recent med list.  
  
  
  
  
 acetaminophen 500 mg tablet Commonly known as:  TYLENOL Take 1 Tab by mouth three (3) times daily as needed for Pain. amLODIPine 5 mg tablet Commonly known as:  [place] TAKE 1 TABLET BY MOUTH DAILY. Arm Brace Misc Commonly known as:  NEOPRENE WRIST SPLINT SUPPORT Use in both hands  
  
 ascorbic acid (vitamin C) 500 mg tablet Commonly known as:  VITAMIN C Take  by mouth.  
  
 calcium 500 mg Tab Take  by mouth. diclofenac 1 % Gel Commonly known as:  VOLTAREN Apply  to affected area four (4) times daily. folic acid 485 mcg tablet Take 800 mcg by mouth daily. gabapentin 100 mg capsule Commonly known as:  NEURONTIN  
TAKE 1 CAPSULE BY MOUTH NIGHTLY. Leg Brace Misc Commonly known as:  ACE KNEE BRACE  
by Does Not Apply route. Use on right knee  
  
 multivitamin tablet Commonly known as:  ONE A DAY Take 1 Tab by mouth daily. VITAMIN B-12 1,000 mcg tablet Generic drug:  cyanocobalamin Take 1,000 mcg by mouth daily. VITAMIN B-6 100 mg tablet Generic drug:  pyridoxine (vitamin B6) Take 100 mg by mouth daily. VITAMIN D3 1,000 unit tablet Generic drug:  cholecalciferol Take  by mouth daily. vitamin E 400 unit capsule Commonly known as:  [address] Take  by mouth daily. We Performed the Following METABOLIC PANEL, BASIC [28006 CPT(R)] Follow-up Instructions Return in about 4 months (around 11/19/2018). Introducing [facility]! [employer] introduces [employer] patient portal. Now you can access parts of your medical record, email your doctor's office, and request medication refills online. 1. In your internet browser, go to [email or web address] 2. Click on the First Time User? Click Here link in the Sign In box. You will see the New Member Sign Up page. 3. Enter your [employer] Access Code exactly as it appears below. You will not need to use this code after youve completed the sign-up process. If you do not sign up before the expiration date, you must request a new code. · [employer] Access Code: [email or web address] Expires: 10/17/2018 10:18 AM 
 
4. Enter the last four digits of your Social Security Number (xxxx) and Date of Birth (mm/dd/yyyy) as indicated and click Submit. You will be taken to the next sign-up page. 5. Create a Sin[employer]a ID. This will be your [employer] login ID and cannot be changed, so think of one that is secure and easy to remember. 6. Create a [employer] password. You can change your password at any time. 7. Enter your Password Reset Question and Answer. This can be used at a later time if you forget your password. 8. Enter your e-mail address. You will receive e-mail notification when new information is available in [address] 9. Click Sign Up. You can now view and download portions of your medical record. 10. Click the Download Summary menu link to download a portable copy of your medical information.  
 
If you have questions, please visit the Frequently Asked Questions section of the Snip[employer]. Remember, [employer] is NOT to be used for urgent needs. For medical emergencies, dial 911. Now available from your iPhone and Android! Please provide this summary of care documentation to your next provider. Your primary care clinician is listed as Mordecai Meigs. If you have any questions after today's visit, please call [phone].

## 2018-08-27 RX ORDER — GABAPENTIN 100 MG/1
CAPSULE ORAL
Qty: 30 CAP | Refills: 5 | Status: SHIPPED | OUTPATIENT
Start: 2018-08-27 | End: 2018-08-28 | Stop reason: SDUPTHER

## 2018-08-28 RX ORDER — GABAPENTIN 100 MG/1
CAPSULE ORAL
Qty: 90 CAP | Refills: 2 | Status: SHIPPED | OUTPATIENT
Start: 2018-08-28 | End: 2018-11-15 | Stop reason: SINTOL

## 2018-09-20 ENCOUNTER — HOSPITAL ENCOUNTER (EMERGENCY)
Age: 83
Discharge: HOME | End: 2018-09-20
Attending: EMERGENCY MEDICINE
Payer: MEDICARE

## 2018-09-20 ENCOUNTER — APPOINTMENT (OUTPATIENT)
Dept: GENERAL RADIOLOGY | Age: 83
End: 2018-09-20
Attending: PHYSICIAN ASSISTANT
Payer: MEDICARE

## 2018-09-20 VITALS
DIASTOLIC BLOOD PRESSURE: 93 MMHG | TEMPERATURE: 98.4 F | HEART RATE: 87 BPM | RESPIRATION RATE: 14 BRPM | BODY MASS INDEX: 25.32 KG/M2 | SYSTOLIC BLOOD PRESSURE: 181 MMHG | WEIGHT: 137.57 LBS | OXYGEN SATURATION: 95 % | HEIGHT: 62 IN

## 2018-09-20 DIAGNOSIS — M71.21 SYNOVIAL CYST OF RIGHT POPLITEAL SPACE: ICD-10-CM

## 2018-09-20 DIAGNOSIS — M25.561 CHRONIC PAIN OF RIGHT KNEE: Primary | ICD-10-CM

## 2018-09-20 DIAGNOSIS — G89.29 CHRONIC PAIN OF RIGHT KNEE: Primary | ICD-10-CM

## 2018-09-20 PROCEDURE — 99283 EMERGENCY DEPT VISIT LOW MDM: CPT

## 2018-09-20 PROCEDURE — 99284 EMERGENCY DEPT VISIT MOD MDM: CPT

## 2018-09-20 PROCEDURE — G8978 MOBILITY CURRENT STATUS: HCPCS

## 2018-09-20 PROCEDURE — 74011250637 HC RX REV CODE- 250/637: Performed by: PHYSICIAN ASSISTANT

## 2018-09-20 PROCEDURE — 97110 THERAPEUTIC EXERCISES: CPT

## 2018-09-20 PROCEDURE — 97161 PT EVAL LOW COMPLEX 20 MIN: CPT

## 2018-09-20 PROCEDURE — 73562 X-RAY EXAM OF KNEE 3: CPT

## 2018-09-20 PROCEDURE — G8979 MOBILITY GOAL STATUS: HCPCS

## 2018-09-20 RX ORDER — ACETAMINOPHEN 325 MG/1
650 TABLET ORAL
Qty: 20 TAB | Refills: 0 | Status: SHIPPED | OUTPATIENT
Start: 2018-09-20 | End: 2018-11-29 | Stop reason: SDUPTHER

## 2018-09-20 RX ORDER — ACETAMINOPHEN 325 MG/1
650 TABLET ORAL
Status: COMPLETED | OUTPATIENT
Start: 2018-09-20 | End: 2018-09-20

## 2018-09-20 RX ADMIN — ACETAMINOPHEN 650 MG: 325 TABLET ORAL at 10:43

## 2018-09-20 NOTE — ED PROVIDER NOTES
EMERGENCY DEPARTMENT HISTORY AND PHYSICAL EXAM 
 
 
Date: 9/20/2018 Patient Name: Chao Cavanaugh History of Presenting Illness Chief Complaint Patient presents with  Knee Pain Ambulatory into triage accompanied by EMS with c/o non-traumtic pain behind her Rt knee x 1 week. Pmh Caputo's Cyst. Pain is worse with walking. Denies pain radiating into her Rt calf. History Provided By: Patient HPI: Chao Cavanaugh, 80 y.o. female with PMHx significant for HTN and baker's cyst, presents ambulatory with EMS to the ED with cc of right knee pain. Patient states that she has been experiencing right sided knee pain for the past week that is worsened when she is walking. She denies any injury or falls to the knee. 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 Last August 2017 her PCP injected a steroid and lidocaine in the knee for symptomatic relief. Patient denies any prior surgeries on the knee. She states that she has been ambulating with rollator as normal, but reports more discomfort when she is walking. She states most of the pain is in the back of her knee. She has not been taking any medications at home to help with the pain. Patient lives at [address] [place] [address] Chief Complaint: right knee pain Duration: 1 Weeks Timing:  Acute on chronic Location: right knee Quality: Aching Severity: 10 out of 10 Modifying Factors: none Associated Symptoms: denies any other associated signs or symptoms There are no other complaints, changes, or physical findings at this time. PCP: Hayley Harry, DO 
 
Current Outpatient Prescriptions Medication Sig Dispense Refill  acetaminophen (TYLENOL) 325 mg tablet Take 2 Tabs by mouth every four (4) hours as needed for Pain. 20 Tab 0  
 gabapentin (NEURONTIN) 100 mg capsule TAKE 1 CAPSULE BY MOUTH NIGHTLY.  90 Cap 2  
  amLODIPine (NORVASC) 5 mg tablet TAKE 1 TABLET BY MOUTH DAILY. 30 Tab 5  
 diclofenac (VOLTAREN) 1 % gel Apply  to affected area four (4) times daily. 100 g 5  
 acetaminophen (TYLENOL) 500 mg tablet Take 1 Tab by mouth three (3) times daily as needed for Pain. 30 Tab prn  vitamin E (AQUA GEMS) 400 unit capsule Take  by mouth daily.  ascorbic acid, vitamin C, (VITAMIN C) 500 mg tablet Take  by mouth.  folic acid 939 mcg tablet Take 800 mcg by mouth daily.  multivitamin (ONE A DAY) tablet Take 1 Tab by mouth daily.  cholecalciferol (VITAMIN D3) 1,000 unit tablet Take  by mouth daily.  pyridoxine (VITAMIN B-6) 100 mg tablet Take 100 mg by mouth daily.  cyanocobalamin (VITAMIN B-12) 1,000 mcg tablet Take 1,000 mcg by mouth daily.  calcium 500 mg tab Take  by mouth.  Arm Brace (NEOPRENE WRIST SPLINT SUPPORT) misc Use in both hands 2 Each 0  
 Leg Brace (ACE KNEE BRACE) misc by Does Not Apply route. Use on right knee 1 Each 0 Past History Past Medical History: 
Past Medical History:  
Diagnosis Date  Baker's cyst of knee  Bone spur R shoulder  Hypertension  Knee pain, right  Osteoporosis  Ovarian cyst   
 Vegetarian diet Past Surgical History: 
Past Surgical History:  
Procedure Laterality Date  HX ANA AND BSO Family History: 
History reviewed. No pertinent family history. Social History: 
Social History Substance Use Topics  Smoking status: Never Smoker  Smokeless tobacco: Never Used  Alcohol use No  
 
 
Allergies: 
No Known Allergies Review of Systems Review of Systems Constitutional: Negative for chills and fever. HENT: Negative for sore throat. Eyes: Negative for pain. Respiratory: Negative for cough and shortness of breath. Cardiovascular: Negative for chest pain. Gastrointestinal: Negative for abdominal pain, diarrhea, nausea and vomiting. Genitourinary: Negative for dysuria and hematuria. Musculoskeletal: Positive for arthralgias (right knee), gait problem and joint swelling (right knee). Negative for back pain and myalgias. Skin: Negative for rash. Neurological: Negative for dizziness, light-headedness, numbness and headaches. Psychiatric/Behavioral: Negative for behavioral problems and confusion. Physical Exam  
Physical Exam  
Constitutional: She is oriented to person, place, and time. She appears well-developed and well-nourished. No distress. HENT:  
Head: Normocephalic and atraumatic. Right Ear: External ear normal.  
Left Ear: External ear normal.  
Nose: Nose normal.  
Eyes: Conjunctivae and EOM are normal.  
Neck: Normal range of motion. Neck supple. Cardiovascular: Normal rate, regular rhythm, normal heart sounds and intact distal pulses. Pulmonary/Chest: Effort normal and breath sounds normal. She has no decreased breath sounds. She has no wheezes. Abdominal: Soft. Bowel sounds are normal. She exhibits no distension. There is no tenderness. There is no guarding. Musculoskeletal:  
RIGHT KNEE: Palpable baker's cyst with TTP over the popliteal space. No deformity or joint laxity. No erythema, ecchymosis, or increased joint warmth. Able to raise leg and bend knee independently. No posterior calf swelling, tenderness or erythema Strong PT and DP pulses Neurological: She is alert and oriented to person, place, and time. Skin: Skin is warm and dry. No rash noted. She is not diaphoretic. Psychiatric: She has a normal mood and affect. Her behavior is normal. Judgment normal.  
Nursing note and vitals reviewed. Diagnostic Study Results Labs - No results found for this or any previous visit (from the past 12 hour(s)). Radiologic Studies -  
XR KNEE RT 3 V Final Result EXAM:  XR KNEE RT 3 V 
  INDICATION:   Nontraumatic posterior right knee pain for a week.  History of 
 Baker cyst. Increased pain with walking. . 
  
COMPARISON: None. 
  
FINDINGS: Three views of the right knee demonstrate moderate medial and lateral 
compartment osteoarthritis with an associated joint effusion. A small osseous 
fragment from the medial femoral condyle is likely chronic. No fracture or 
dislocation. The patient is probably osteopenic. 
  
IMPRESSION IMPRESSION:   
1. Moderate osteoarthritis and associated joint effusion. 2. Small medial femoral condylar osseous fragment is likely chronic and probably 
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 Medical Decision Making I am the first provider for this patient. I reviewed the vital signs, available nursing notes, past medical history, past surgical history, family history and social history. Vital Signs-Reviewed the patient's vital signs. Patient Vitals for the past 12 hrs: 
 Temp Pulse Resp BP SpO2  
09/20/18 1003 98.4 °F (36.9 °C) 87 14 (!) 181/93 95 % Records Reviewed: Nursing Notes and Old Medical Records Provider Notes (Medical Decision Making): DDx: OA, Baker's cyst, contusion, effusion, sprain, strain, bursitis; cannot exclude internal derangement. Doubt gout, or septic arthritis, due to patient's presentation. Patient presents with acute on chronic right knee pain for the past week. Will get analgesics and xray to further evaluate. ED Course:  
Initial assessment performed. The patients presenting problems have been discussed, and they are in agreement with the care plan formulated and outlined with them. I have encouraged them to ask questions as they arise throughout their visit. CONSULT NOTE:  
10:49 AM 
Preston Torres PA-C spoke with Vishal Rudolph, Specialty: Physical Therapy Discussed pt's hx, disposition, and available diagnostic and imaging results. Reviewed care plans. Consultant agrees with plans as outlined.  
 
Procedure Note - Ace Wrap Placement: 
11:42 AM 
Performed by: Preston Torres PA-C 
 Neurovascularly intact prior to tx. An ace wrap was placed on pt's right knee. Joint was placed in neutral position. Neurovascularly intact after tx. The procedure took 1-15 minutes, and pt tolerated well. Disposition: 
DISCHARGE NOTE: 
12:03 PM 
The care plan has been outline with the patient and/or family, who verbally conveyed understanding and agreement. Available results have been reviewed. Patient and/or family understand the follow up plan as outlined and discharge instructions. Should their condition deterioration at any time after discharge the patient agrees to return, follow up sooner than outlined or seek medical assistance at the closest Emergency Room as soon as possible. Questions have been answered. Discharge instructions and educational information regarding the patient's diagnosis as well a list of reasons why the patient would want to seek immediate medical attention, should their condition change, were reviewed directly with the patient/family PLAN: 
1. Discharge home 2. Medications as directed 3. Schedule f/u with Orthopedic 4. Return precautions reviewed Current Discharge Medication List  
  
START taking these medications Details  
!! acetaminophen (TYLENOL) 325 mg tablet Take 2 Tabs by mouth every four (4) hours as needed for Pain. Qty: 20 Tab, Refills: 0  
  
 !! - Potential duplicate medications found. Please discuss with provider. CONTINUE these medications which have NOT CHANGED Details  
!! acetaminophen (TYLENOL) 500 mg tablet Take 1 Tab by mouth three (3) times daily as needed for Pain. Qty: 30 Tab, Refills: prn  
  
 !! - Potential duplicate medications found. Please discuss with provider. 5.  
Follow-up Information Follow up With Details Comments Contact Info Art Hartmann MD Go to  [address] [place] [address] [address] [place] [address] 
[phone] [facility] EMERGENCY DEPT  As needed, If symptoms worsen [facility] [address] [facility] 
[phone] Return to ED if worse Diagnosis Clinical Impression: 1. Chronic pain of right knee 2. Synovial cyst of right popliteal space This note will not be viewable in [address]

## 2018-09-20 NOTE — PROGRESS NOTES
CM consulted re: transportation. Nursing staff discussed [facility]charge [facility] with patient. Pt declined the service at this time, but is agreeable to taking a LYFT home. CM arranged a LYFT [employer] ride for the patient with a requested time for 12:10 p.m. CM awaiting a call from [employer] for  time. CM notified nursing staff. No further needs identified at this time. CM will continue to remain available for support, discharge planning as needed. Payal Marques, HALINA [facility] [phone]

## 2018-09-20 NOTE — DISCHARGE INSTRUCTIONS
Thank you!     Thank you for allowing us to provide you with excellent care today. We hope we addressed all of your concerns and needs. We strive to provide excellent quality care in the Emergency Department. You will receive a survey after your visit to evaluate the care you were provided.      Please rate us a level 5 (excellent), as anything less than excellent does not meet our goals.      If you feel that you have not received excellent quality care or timely care, please ask to speak to the nurse manager. Please choose us in the future for your continued health care needs. ______________________________________________________________________    The exam and treatment you received in the Emergency Department were for an urgent problem and are not intended as complete care. It is important that you follow-up with a doctor, nurse practitioner, or physician assistant to:  (1) confirm your diagnosis,  (2) re-evaluation of changes in your illness and treatment, and  (3) for ongoing care. If your symptoms become worse or you do not improve as expected and you are unable to reach your usual health care provider, you should return to the Emergency Department. We are available 24 hours a day. Take this sheet with you when you go to your follow-up visit. If you have any problem arranging the follow-up visit, contact [address] [place] [address] 21 [phone])    Make an appointment with your Primary Care doctor for follow up of this visit. Return to the ER if you are unable to be seen in the time recommended on your discharge instructions. Baker's Cyst: Care Instructions  Your Care Instructions    A Baker's cyst is a swelling behind the knee. It may cause pain or stiffness when you bend your knee or straighten it all the way. Baker's cysts are also called popliteal cysts. If you have arthritis or another condition that is the cause of the Baker's cyst, your doctor may treat that condition. A Baker's cyst may go away on its own. If not, or if it is causing a lot of discomfort, your doctor may drain the fluid that has built up behind the knee. In some cases, a Baker's cyst is removed in surgery. There are things you can do at home, such as staying off your leg, to reduce the swelling and pain. Follow-up care is a key part of your treatment and safety. Be sure to make and go to all appointments, and call your doctor if you are having problems. It's also a good idea to know your test results and keep a list of the medicines you take. How can you care for yourself at home? · Rest your knee as much as possible. · Ask your doctor if you can take an over-the-counter pain medicine, such as acetaminophen (Tylenol), ibuprofen (Advil, Motrin), or naproxen (Aleve). Be safe with medicines. Read and follow all instructions on the label. · Use a cane, a crutch, a walker, or another device if you need help to get around. These can help rest your knees. · If you have an elastic bandage, make sure it is snug but not so tight that your leg is numb, tingles, or swells below the bandage. Ask your doctor if you can loosen the bandage if it is too tight. · Follow your doctor's instructions about how much weight you can put on your knee. · Stay at a healthy weight. Being overweight puts extra strain on your knee. When should you call for help? Call 911 anytime you think you may need emergency care. For example, call if:    · You have chest pain, are short of breath, or you cough up blood.    Call your doctor now or seek immediate medical care if:    · You have new or worse pain.     · Your foot is cool or pale or changes color.     · You have tingling, weakness, or numbness in your foot or toes.     · You have signs of a blood clot in your leg (called a deep vein thrombosis), such as:  ¨ Pain in your calf, back of the knee, thigh, or groin.   ¨ Redness or swelling in your leg.    Watch closely for changes in your health, and be sure to contact your doctor if:    · You do not get better as expected. Where can you learn more? Go to http://mark-ricardo.info/. Enter O697 in the search box to learn more about \"Baker's Cyst: Care Instructions. \"  Current as of: November 29, 2017  Content Version: 11.7  © 0158-0681 [employer]. Care instructions adapted under license by [employer] (which disclaims liability or warranty for this information). If you have questions about a medical condition or this instruction, always ask your healthcare professional. [address] any warranty or liability for your use of this information.

## 2018-09-20 NOTE — ED NOTES
Emiliana Roe to work on discharge express for ride home as patient has Dementia. Pt has been updated on plan of care several times

## 2018-09-20 NOTE — PROGRESS NOTES
physical Therapy Emergency Department EVALUATION/DISCHARGE with CMS G codes Patient: Michael Myrick (29 y.o. female) Date: 9/20/2018 Primary Diagnosis: There are no admission diagnoses documented for this encounter. Precautions:     
ASSESSMENT : 
Based on the objective data described below, the patient presents with hx significant for baker's cyst R knee, pain is main c/o, has been independent with mobility and continues to be so. MARCUS Cervantes consulted PT for basic exercise program. 
Pt lives in [facility] independent living. She is mod I for amb with cane and rollator. She states she keeps active and takes walks in the halls and goes out with their DarVerdiem Buttery for shopping trips. She is independent in ADLs. At this time, pt is observed walking around ED to and from bathroom and in room with no device independently with minimally R antalgic gait. She shows no LOB or path deviation. She does self limit full WB and shift onto RLE in stance. She is independent in transfers and bed mobility. She shows overall good strength in LEs. Her R knee is painful with flexion and shows ~ 100 degrees flexion and -3 degrees from full extension. Educated pt in seated exercise program for LEs emphasizing knee AROM for prevention of loss of movement for functional gait. Also reviewed standing knee flexion exercise. Discussed possibility of PT services at [facility], but pt prefers to continue with the HEP. Encouraged her to avail herself through her MD of the PT services should any pain/decreased ROM/function continue. She is in agreement and states she intends to remain active. Reviewed home safety. No other current PT needs. Further acute physical therapy in the ED is not indicated at this time. PLAN : 
Discharge Recommendations:  
 
[]   Home with family []   Skilled nursing facility []   Admission to hospital with rehab likely needed 
[]   Inpatient rehab referral 
[]   Outpatient physical therapy referral 
 [x]   Other:home to Independent Living Further Equipment Recommendations for Discharge:  
[]   Rolling walker with 5\" wheels 
[]   Crutches  
[]   Cane  
[]   Wheelchair  
[x]   Other: has rollator and cane COMMUNICATION/EDUCATION:  
Communication/Collaboration: 
[x]   Fall prevention education was provided and the patient/caregiver indicated understanding. [x]   Patient/family have participated as able and agree with findings and recommendations. []   Patient is unable to participate in plan of care at this time. Findings and recommendations were discussed with: MD physician and Registered Nurse and PA SUBJECTIVE:  
Patient stated I haven't have much wrong before.  OBJECTIVE DATA SUMMARY:  
 
Past Medical History:  
Diagnosis Date  Baker's cyst of knee  Bone spur R shoulder  Hypertension  Knee pain, right  Osteoporosis  Ovarian cyst   
 Vegetarian diet Past Surgical History:  
Procedure Laterality Date  HX ANA AND BSO Prior Level of Function/Home Situation: Pt lives in [facility] independent living. She is mod I for amb with cane and rollator. She states she keeps active and takes walks in the halls and goes out with their Brandon Tammy for shopping trips. She is independent in ADLs. Home Situation Home Environment: Independent living (independent living apt at [facility]) # Steps to Enter: 0 One/Two Story Residence: One story Living Alone: Yes Support Systems:  (community at [facility]) Patient Expects to be Discharged to[de-identified]  ([facility]) Current DME Used/Available at Home: naresh Madsen Jerald Lingo rollator Critical Behavior: 
Neurologic State: Alert Orientation Level: Oriented X4 Cognition: Follows commands Strength:   
Strength: Grossly decreased, non-functional 
  
  
  
  
  
  
Tone & Sensation:  
  
  
  
  
  
Sensation: Intact Range Of Motion: AROM: Within functional limits (R knee to 100 degrees flexion, - 3 degrees extension) Coordination: 
Coordination: Within functional limits Functional Mobility: 
Bed Mobility: 
Rolling: Independent Supine to Sit: Independent Sit to Supine: Independent Transfers: 
Sit to Stand: Independent Stand to Sit: Independent Balance:  
Sitting: Intact Standing: Intact Ambulation/Gait Training: 
Distance (ft): 150 Feet (ft) Assistive Device:  (none in dept) Ambulation - Level of Assistance: Independent; Modified independent (walking around ED w/o device; uses cane or rollator) Gait Abnormalities: Antalgic (minimally on R) Stance: Right decreased Speed/Ximena: Slow (slowed slightly from her reported normal) Therapeutic Exercises:  
See above note Functional Measure: 
Tinetti test: 
 
Sitting Balance: 1 Arises: 2 Attempts to Rise: 2 Immediate Standing Balance: 2 Standing Balance: 2 Nudged: 2 Eyes Closed: 1 Turn 360 Degrees - Continuous/Discontinuous: 1 Turn 360 Degrees - Steady/Unsteady: 1 Sitting Down: 2 Balance Score: 16 Indication of Gait: 1 
R Step Length/Height: 1 L Step Length/Height: 1 
R Foot Clearance: 1 L Foot Clearance: 1 Step Symmetry: 1 Step Continuity: 1 Path: 2 Trunk: 2 Walking Time: 0 Gait Score: 11 Total Score: 27 Tinetti Test and G-code impairment scale: 
Percentage of Impairment CH 
 
0% 
 CI 
 
1-19% CJ 
 
20-39% CK 
 
40-59% CL 
 
60-79% CM 
 
80-99% CN  
 
100% Tinetti Score 0-28 28 23-27 17-22 12-16 6-11 1-5 0 Tinetti Tool Score Risk of Falls 
<19 = High Fall Risk 19-24 = Moderate Fall Risk 25-28 = Low Fall Risk Tinetti ME. Performance-Oriented Assessment of Mobility Problems in Elderly Patients. [facility] 66; [id number]. (Scoring Description: PT Bulletin Feb. 10, 1993) Older adults: Papa Nettles et al, 2009; n = [address] elderly evaluated with ABC, ISAAC, ADL, and IADL) · Mean ISAAC score for males aged 69-68 years = 26.21(3.40) · Mean ISAAC score for females age 69-68 years = 25.16(4.30) · Mean ISAAC score for males over 80 years = 23.29(6.02) · Mean ISAAC score for females over 80 years = 17.20(8.32) In compliance with CMSs Claims Based Outcome Reporting, the following G-code set was chosen for this patient based on their primary functional limitation being treated: The outcome measure chosen to determine the severity of the functional limitation was the tinetti with a score of 27/28 which was correlated with the impairment scale. ? Mobility - Walking and Moving Around:  
  - CURRENT STATUS: CI - 1%-19% impaired, limited or restricted  - GOAL STATUS: CI - 1%-19% impaired, limited or restricted  - D/C STATUS:  CI - 1%-19% impaired, limited or restricted Pain: 
Pain Scale 1: Numeric (0 - 10) Pain Intensity 1: 10 
Pain Location 1: Knee Pain Orientation 1: Posterior;Right Pain Description 1: Constant Pain Intervention(s) 1: Therapeutic presence Activity Tolerance:  
Good for session Please refer to the flowsheet for vital signs taken during this treatment. After treatment:  
[]         Patient left in no apparent distress sitting up in chair 
[x]         Patient left in no apparent distress in bed 
[x]         Call bell left within reach [x]         Nursing notified 
[]         Caregiver present 
[]         Bed alarm activated Thank you for this referral. 
Travon Patel, PT Time Calculation: 21 mins

## 2018-11-15 ENCOUNTER — OFFICE VISIT (OUTPATIENT)
Dept: INTERNAL MEDICINE CLINIC | Age: 83
End: 2018-11-15

## 2018-11-15 ENCOUNTER — HOSPITAL ENCOUNTER (OUTPATIENT)
Dept: LAB | Age: 83
Discharge: HOME | End: 2018-11-15
Payer: MEDICARE

## 2018-11-15 VITALS
OXYGEN SATURATION: 91 % | BODY MASS INDEX: 25.58 KG/M2 | HEIGHT: 62 IN | SYSTOLIC BLOOD PRESSURE: 148 MMHG | TEMPERATURE: 98.7 F | HEART RATE: 91 BPM | WEIGHT: 139 LBS | RESPIRATION RATE: 18 BRPM | DIASTOLIC BLOOD PRESSURE: 70 MMHG

## 2018-11-15 DIAGNOSIS — I10 ESSENTIAL HYPERTENSION: Primary | ICD-10-CM

## 2018-11-15 DIAGNOSIS — R26.9 GAIT DIFFICULTY: ICD-10-CM

## 2018-11-15 DIAGNOSIS — Z00.00 MEDICARE ANNUAL WELLNESS VISIT, SUBSEQUENT: ICD-10-CM

## 2018-11-15 DIAGNOSIS — Z23 ENCOUNTER FOR IMMUNIZATION: ICD-10-CM

## 2018-11-15 DIAGNOSIS — S89.91XA INJURY OF RIGHT LOWER EXTREMITY, INITIAL ENCOUNTER: ICD-10-CM

## 2018-11-15 DIAGNOSIS — Z71.89 ACP (ADVANCE CARE PLANNING): ICD-10-CM
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
## 2018-11-15 PROCEDURE — 36415 COLL VENOUS BLD VENIPUNCTURE: CPT

## 2018-11-15 PROCEDURE — 80053 COMPREHEN METABOLIC PANEL: CPT

## 2018-11-15 PROCEDURE — 85027 COMPLETE CBC AUTOMATED: CPT

## 2018-11-15 NOTE — PROGRESS NOTES
Schedule of Personalized Health Plan (Provide Copy to Patient) The best way to stay healthy is to live a healthy lifestyle. A healthy lifestyle includes regular exercise, eating a well-balanced diet, keeping a healthy weight and not smoking. Regular physical exams and screening tests are another important way to take care of yourself. Preventive exams provided by health care providers can find health problems early when treatment works best and can keep you from getting certain diseases or illnesses. Preventive services include exams, lab tests, screenings, shots, monitoring and information to help you take care of your own health. All people over 65 should have a pneumonia shot. Pneumonia shots are usually only needed once in a lifetime unless your doctor decides differently. All people over 65 should have a yearly flu shot. People over 65 are at medium to high risk for Hepatitis B. Three shots are needed for complete protection. In addition to your physical exam, some screening tests are recommended: 
 
 
Screening for Breast Cancer is recommended yearly with a mammogram. 
 
Screening for Cervical Cancer is recommended every two years (annually for certain risk factors, such as previous history of STD or abnormal PAP in past 7 years), with a Pelvic Exam with PAP 
 
 Here is a list of your current Health Maintenance items with a due date: 
Health Maintenance Topic Date Due  
 DTaP/Tdap/Td series (1 - Tdap) 08[birth year]15/1950  Shingrix Vaccine Age 50> (1 of 2) 08/15/1979  Pneumococcal 65+ Low/Medium Risk (2 of 2 - PPSV23) 06/13/2018  Influenza Age 5 to Adult  08/01/2018  GLAUCOMA SCREENING Q2Y  06/06/2019  MEDICARE YEARLY EXAM  11/16/2019

## 2018-11-15 NOTE — PROGRESS NOTES
Health Maintenance Due Topic Date Due  
 DTaP/Tdap/Td series (1 - Tdap) 08/15/1950  Shingrix Vaccine Age 50> (1 of 2) 08/15/1979  MEDICARE YEARLY EXAM  06/07/2018  Pneumococcal 65+ Low/Medium Risk (2 of 2 - PPSV23) 06/13/2018  Influenza Age 5 to Adult  08/01/2018 Chief Complaint Patient presents with  Fall  
  11/14/18, leg bruised, tail bone sore  Hypertension F/U 4 mo 1. Have you been to the ER, urgent care clinic since your last visit? Hospitalized since your last visit? No 
 
2. Have you seen or consulted any other health care providers outside of the [address] [place] [address] since your last visit? Include any pap smears or colon screening. No 
 
3) Do you have an Advance Directive on file? no 
 
4) Are you interested in receiving information on Advance Directives? NO Patient is accompanied by self I have received verbal consent from Hood Bianchi to discuss any/all medical information while they are present in the room. Hood Bianchi is a 80 y.o. female  who presents for routine immunizations. She denies any symptoms , reactions or allergies that would exclude them from being immunized today. Risks and adverse reactions were discussed and the VIS was given to them. All questions were addressed. She was observed for 10 min post injection. There were no reactions observed.  
 
Florence Zamorano LPN

## 2018-11-15 NOTE — PROGRESS NOTES
Dominic Layton is a 80 y.o. female and presents for annual Medicare Wellness Visit. Problem List: Reviewed with patient and discussed risk factors. Patient Active Problem List  
Diagnosis Code  Essential hypertension I10  
 OP (osteoporosis) M81.0  Chronic pain of right knee M25.561, G89.29  Baker's cyst M71.20  Primary osteoarthritis of both knees M17.0  Gait difficulty R26.9  Memory impairment R41.3  Bilateral finger numbness R20.0  ACP (advance care planning) Z71.89 Current medical providers:  Patient Care Team: 
James Jacobson DO as PCP - General (Internal Medicine) Nita Márquez LPN 
 
PSH: Reviewed with patient Past Surgical History:  
Procedure Laterality Date  HX ANA AND BSO SH: Reviewed with patient Social History Tobacco Use  Smoking status: Never Smoker  Smokeless tobacco: Never Used Substance Use Topics  Alcohol use: No  
 Drug use: No  
 
 
FH: Reviewed with patient History reviewed. No pertinent family history. Medications/Allergies: Reviewed with patient Current Outpatient Medications on File Prior to Visit Medication Sig Dispense Refill  acetaminophen (TYLENOL) 325 mg tablet Take 2 Tabs by mouth every four (4) hours as needed for Pain. 20 Tab 0  
 amLODIPine (NORVASC) 5 mg tablet TAKE 1 TABLET BY MOUTH DAILY. 30 Tab 5  
 diclofenac (VOLTAREN) 1 % gel Apply  to affected area four (4) times daily. 100 g 5  
 acetaminophen (TYLENOL) 500 mg tablet Take 1 Tab by mouth three (3) times daily as needed for Pain. 30 Tab prn  Arm Brace (NEOPRENE WRIST SPLINT SUPPORT) misc Use in both hands 2 Each 0  
 vitamin E (AQUA GEMS) 400 unit capsule Take  by mouth daily.  ascorbic acid, vitamin C, (VITAMIN C) 500 mg tablet Take  by mouth.  folic acid 416 mcg tablet Take 800 mcg by mouth daily.  multivitamin (ONE A DAY) tablet Take 1 Tab by mouth daily.  cholecalciferol (VITAMIN D3) 1,000 unit tablet Take  by mouth daily.  pyridoxine, vitamin B6, (VITAMIN B-6) 100 mg tablet Take 100 mg by mouth daily.  cyanocobalamin (VITAMIN B-12) 1,000 mcg tablet Take 1,000 mcg by mouth daily.  calcium 500 mg tab Take  by mouth. No current facility-administered medications on file prior to visit. No Known Allergies Objective: 
Visit Vitals /70 (BP 1 Location: Right arm, BP Patient Position: Sitting) Pulse 91 Temp 98.7 °F (37.1 °C) (Oral) Resp 18 Ht 5' 2\" (1.575 m) Wt 139 lb (63 kg) SpO2 91% BMI 25.42 kg/m² Body mass index is 25.42 kg/m². Assessment of cognitive impairment: Alert and oriented x 3 Depression Screen: PHQ over the last two weeks 11/15/2018 Little interest or pleasure in doing things Not at all Feeling down, depressed, irritable, or hopeless Not at all Total Score PHQ 2 0 Fall Risk Assessment:   
Fall Risk Assessment, last 12 mths 11/15/2018 Able to walk? Yes Fall in past 12 months? Yes Fall with injury? No  
Number of falls in past 12 months 2 Fall Risk Score 2 Functional Ability:  
Does the patient exhibit a steady gait?  no How long did it take the patient to get up and walk from a sitting position? 10 sec Is the patient self reliant?  (ie can do own laundry, meals, household chores)  yes Does the patient handle his/her own medications? yes Does the patient handle his/her own money? yes Is the patients home safe (ie good lighting, handrails on stairs and bath, etc.)? yes Did you notice or did patient express any hearing difficulties? Yes, wears HA's 
  
Did you notice or did patient express any vision difficulties?   no 
  
Were distance and reading eye charts used? no 
  
 
Advance Care Planning:  
Patient was offered the opportunity to discuss advance care planning:  yes Does patient have an Advance Directive:  yes If no, did you provide information on Caring Connections?  no  
 
Plan: No orders of the defined types were placed in this encounter. Health Maintenance Topic Date Due  
 DTaP/Tdap/Td series (1 - Tdap) 08/15/1950  Shingrix Vaccine Age 50> (1 of 2) 08/15/1979  Pneumococcal 65+ Low/Medium Risk (2 of 2 - PPSV23) 06/13/2018  Influenza Age 5 to Adult  08/01/2018  GLAUCOMA SCREENING Q2Y  06/06/2019  MEDICARE YEARLY EXAM  11/16/2019 *Patient verbalized understanding and agreement with the plan. A copy of the After Visit Summary with personalized health plan was given to the patient today.

## 2018-11-22 LAB
ALBUMIN SERPL-MCNC: 4.2 G/DL (ref 3.5–4.7)
ALBUMIN/GLOB SERPL: 1.5 {RATIO} (ref 1.2–2.2)
ALP SERPL-CCNC: 77 IU/L (ref 39–117)
ALT SERPL-CCNC: 16 IU/L (ref 0–32)
AST SERPL-CCNC: 21 IU/L (ref 0–40)
BILIRUB SERPL-MCNC: 0.3 MG/DL (ref 0–1.2)
BUN SERPL-MCNC: 22 MG/DL (ref 8–27)
BUN/CREAT SERPL: 28 (ref 12–28)
CALCIUM SERPL-MCNC: 9.2 MG/DL (ref 8.7–10.3)
CHLORIDE SERPL-SCNC: 106 MMOL/L (ref 96–106)
CO2 SERPL-SCNC: 23 MMOL/L (ref 20–29)
CREAT SERPL-MCNC: 0.8 MG/DL (ref 0.57–1)
ERYTHROCYTE [DISTWIDTH] IN BLOOD BY AUTOMATED COUNT: 13.9 % (ref 12.3–15.4)
GLOBULIN SER CALC-MCNC: 2.8 G/DL (ref 1.5–4.5)
GLUCOSE SERPL-MCNC: 107 MG/DL (ref 65–99)
HCT VFR BLD AUTO: 38.6 % (ref 34–46.6)
HGB BLD-MCNC: 13 G/DL (ref 11.1–15.9)
MCH RBC QN AUTO: 31.3 PG (ref 26.6–33)
MCHC RBC AUTO-ENTMCNC: 33.7 G/DL (ref 31.5–35.7)
MCV RBC AUTO: 93 FL (ref 79–97)
PLATELET # BLD AUTO: 251 X10E3/UL (ref 150–379)
POTASSIUM SERPL-SCNC: 4.4 MMOL/L (ref 3.5–5.2)
PROT SERPL-MCNC: 7 G/DL (ref 6–8.5)
RBC # BLD AUTO: 4.15 X10E6/UL (ref 3.77–5.28)
SODIUM SERPL-SCNC: 143 MMOL/L (ref 134–144)
WBC # BLD AUTO: 7.6 X10E3/UL (ref 3.4–10.8)

## 2018-11-29 ENCOUNTER — OFFICE VISIT (OUTPATIENT)
Dept: INTERNAL MEDICINE CLINIC | Age: 83
End: 2018-11-29

## 2018-11-29 VITALS
DIASTOLIC BLOOD PRESSURE: 90 MMHG | BODY MASS INDEX: 25.17 KG/M2 | HEART RATE: 95 BPM | OXYGEN SATURATION: 93 % | HEIGHT: 62 IN | WEIGHT: 136.8 LBS | RESPIRATION RATE: 17 BRPM | TEMPERATURE: 98.5 F | SYSTOLIC BLOOD PRESSURE: 142 MMHG

## 2018-11-29 DIAGNOSIS — I10 ESSENTIAL HYPERTENSION: ICD-10-CM

## 2018-11-29 DIAGNOSIS — M25.551 RIGHT HIP PAIN: Primary | ICD-10-CM
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
## 2018-11-29 DIAGNOSIS — R26.9 GAIT DIFFICULTY: ICD-10-CM

## 2018-11-29 RX ORDER — IBUPROFEN 200 MG
200 TABLET ORAL
Qty: 15 TAB | Refills: 0 | Status: SHIPPED | OUTPATIENT
Start: 2018-11-29 | End: 2018-12-04

## 2018-11-29 NOTE — PROGRESS NOTES
HISTORY OF PRESENT ILLNESS Jitendra Henning is a 80 y.o. female. Pt. comes in for f/u. Has multiple medical problems. Reports falling off a stand while trying to change a light ball a few days ago in her apartment. Reports having pain in her right hip and buttock area. Is worse with weightbearing. Denies any other areas of injury. Gait is unsteady. Uses a walker. Has been taking Advil with some relief. BP is stable. Has some memory impairment. Reports compliance with medications and diet. Med list and most recent labs/studies reviewed with pt. Trying to be active physically as tolerated. Reports no other new c/o. HPI Review of Systems Constitutional: Negative. HENT: Negative. Eyes: Negative for blurred vision. Respiratory: Negative for shortness of breath. Cardiovascular: Negative for chest pain and leg swelling. Gastrointestinal: Negative for abdominal pain. Genitourinary: Negative for dysuria. Musculoskeletal: Positive for falls and joint pain. Negative for back pain. Skin: Negative. Neurological: Positive for tingling and sensory change. Negative for dizziness, focal weakness, loss of consciousness and headaches. Psychiatric/Behavioral: Positive for memory loss. Negative for depression. The patient is not nervous/anxious and does not have insomnia. All other systems reviewed and are negative. Physical Exam  
Constitutional: She is oriented to person, place, and time. She appears well-developed and well-nourished. No distress. HENT:  
Head: Normocephalic and atraumatic. Mouth/Throat: Oropharynx is clear and moist.  
Eyes: Conjunctivae are normal.  
Neck: Normal range of motion. Neck supple. No JVD present. No thyromegaly present. Cardiovascular: Normal rate, regular rhythm, normal heart sounds and intact distal pulses. No murmur heard. Pulmonary/Chest: Effort normal and breath sounds normal. No respiratory distress. She has no wheezes. She has no rales. Abdominal: Soft. Bowel sounds are normal. She exhibits no distension. Musculoskeletal: She exhibits tenderness (R hip/buttock). She exhibits no edema. DJD Neurological: She is alert and oriented to person, place, and time. Coordination abnormal.  
Mild memory issues Skin: Skin is warm and dry. No rash noted. Psychiatric: She has a normal mood and affect. Her behavior is normal.  
Nursing note and vitals reviewed. ASSESSMENT and PLAN Diagnoses and all orders for this visit: 1. Right hip pain -     XR HIP RT W OR WO PELV 2-3 VWS; Future 2. Fall, initial encounter -     XR HIP RT W OR WO PELV 2-3 VWS; Future 3. Gait difficulty 4. Essential hypertension Other orders -     ibuprofen (ADVIL) 200 mg tablet; Take 1 Tab by mouth three (3) times daily (after meals) for 5 days. Tylenol 500 mg 1 QID prn pain Apply ice/heat to area Follow-up Disposition: 
Return if symptoms worsen or fail to improve.  
lab results and schedule of future lab studies reviewed with patient 
reviewed diet, exercise and weight control 
reviewed medications and side effects in detail Fall precautions discussed Will get a mobile xray of R hip

## 2018-11-29 NOTE — PROGRESS NOTES
Health Maintenance Due Topic Date Due  
 DTaP/Tdap/Td series (1 - Tdap) 08/15/1950  Shingrix Vaccine Age 50> (1 of 2) 08/15/1979 Chief Complaint Patient presents with  Fall  Hip Pain Right r/t fall 1. Have you been to the ER, urgent care clinic since your last visit? Hospitalized since your last visit? No 
 
2. Have you seen or consulted any other health care providers outside of the [address] [place] [address] since your last visit? Include any pap smears or colon screening. No 
 
3) Do you have an Advance Directive on file? no 
 
4) Are you interested in receiving information on Advance Directives? NO Patient is accompanied by self I have received verbal consent from Tati Oliver to discuss any/all medical information while they are present in the room.

## 2018-12-01 NOTE — ACP (ADVANCE CARE PLANNING)
Advance Care Planning (ACP) Provider Conversation Snapshot    Date of ACP Conversation: 11/30/18  Persons included in Conversation:  patient  Length of ACP Conversation in minutes:  16 minutes    Authorized Decision Maker (if patient is incapable of making informed decisions):    This person is:   Healthcare Agent/Medical Power of  under Advance Directive          For Patients with Decision Making Capacity:   Values/Goals: Exploration of values, goals, and preferences if recovery is not expected, even with continued medical treatment in the event of:  Imminent death    Conversation Outcomes / Follow-Up Plan:   Recommended completion of Advance Directive form after review of ACP materials and conversation with prospective healthcare agent

## 2018-12-17 ENCOUNTER — OFFICE VISIT (OUTPATIENT)
Dept: INTERNAL MEDICINE CLINIC | Age: 83
End: 2018-12-17

## 2018-12-17 VITALS
SYSTOLIC BLOOD PRESSURE: 138 MMHG | RESPIRATION RATE: 18 BRPM | DIASTOLIC BLOOD PRESSURE: 61 MMHG | OXYGEN SATURATION: 94 % | WEIGHT: 132.4 LBS | BODY MASS INDEX: 24.37 KG/M2 | TEMPERATURE: 99.4 F | HEART RATE: 86 BPM | HEIGHT: 62 IN
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
## 2018-12-17 DIAGNOSIS — M25.552 PAIN OF LEFT HIP JOINT: Primary | ICD-10-CM

## 2018-12-17 RX ORDER — MELOXICAM 15 MG/1
15 TABLET ORAL DAILY
Qty: 14 TAB | Refills: 0 | Status: SHIPPED | OUTPATIENT
Start: 2018-12-17 | End: 2018-12-27 | Stop reason: ALTCHOICE

## 2018-12-17 NOTE — PROGRESS NOTES
HISTORY OF PRESENT ILLNESS  Emma Sweeney is a 80 y.o. female. This is a patient of Dr. Sherry Christine who presents today with complaints of pain. The patient was seen in the office 11/29/18 after having a fall from a stand while trying to change a light bulb. Per chart review, the patient had complaints of pain to right hip and buttock. She had x-ray of right hip that was negative for acute process. At time of today's visit, the patient has complaints of pain to left hip, left buttock, and left thigh. She states pain has worsened since time of fall. She has increased pain with ambulation. Aspercreme, Tylenol, and Advil have not been effective for pain relief. HPI    Review of Systems   Constitutional: Negative for chills and fever. Respiratory: Negative for cough and shortness of breath. Cardiovascular: Negative for chest pain and leg swelling. Gastrointestinal: Negative for abdominal pain. Genitourinary: Negative. Musculoskeletal: Positive for joint pain. Neurological: Negative. Endo/Heme/Allergies: Negative. Psychiatric/Behavioral: Negative. Physical Exam   Constitutional: She is oriented to person, place, and time. She appears well-developed and well-nourished. No distress. Cardiovascular: Normal rate and regular rhythm. Pulmonary/Chest: Effort normal and breath sounds normal. No respiratory distress. She has no wheezes. She has no rales. Abdominal: Soft. She exhibits no distension. Musculoskeletal: She exhibits tenderness. She exhibits no edema. Left hip pain, increased with ambulation   Neurological: She is alert and oriented to person, place, and time. Skin: Skin is warm and dry. Psychiatric: She has a normal mood and affect. Her behavior is normal.       ASSESSMENT and PLAN  Encounter Diagnoses   Name Primary?     Pain of left hip joint Yes    Fall, initial encounter      Orders Placed This Encounter    XR HIP LT W OR WO PELV 2-3 VWS    XR SPINE LUMB 2 OR 3 V    Will order  meloxicam (MOBIC) 15 mg tablet, 1 tab po daily x 14 days. Stop Advil. Lab results and schedule of future lab studies reviewed with patient  Reviewed medications and side effects in detail    Patient encouraged to call or return to office if symptoms do not improve or worsen. Reviewed plan of care with patient who acknowledges understanding and agrees.     Discussed above plan of care with patient's daughter, Mrs. Zoie Goodwin, via telephone, per patient request.

## 2018-12-17 NOTE — PROGRESS NOTES
Health Maintenance Due   Topic Date Due    DTaP/Tdap/Td series (1 - Tdap) 08/15/1950    Shingrix Vaccine Age 49> (1 of 2) 08/15/1979       Chief Complaint   Patient presents with    Hip Pain     F/U 11/29/18       1. Have you been to the ER, urgent care clinic since your last visit? Hospitalized since your last visit? No    2. Have you seen or consulted any other health care providers outside of the [facility] since your last visit? Include any pap smears or colon screening. No    3) Do you have an Advance Directive on file? yes    4) Are you interested in receiving information on Advance Directives? NO      Patient is accompanied by self I have received verbal consent from Jose Martin Anaya to discuss any/all medical information while they are present in the room. Hip pain rated 10/10, painful when weight bearing, able to sleep w/o pain.

## 2018-12-18 ENCOUNTER — DOCUMENTATION ONLY (OUTPATIENT)
Dept: INTERNAL MEDICINE CLINIC | Age: 83
End: 2018-12-18

## 2018-12-18 NOTE — PROGRESS NOTES
Contacted patient to review recent imaging results. Two patient identifiers verified. Explained to patient imaging revealed no fractures, patient advised she has arthritis in the left hip, and also to continue to take the meloxicam as ordered as well as to notify the clinic or this office if symptoms worsen or fail to improve. Patient verbalized thanks and understanding as the call ended with no further question.     Madeleine Robledo LPN

## 2018-12-27 ENCOUNTER — OFFICE VISIT (OUTPATIENT)
Dept: INTERNAL MEDICINE CLINIC | Age: 83
End: 2018-12-27

## 2018-12-27 VITALS
OXYGEN SATURATION: 92 % | DIASTOLIC BLOOD PRESSURE: 78 MMHG | TEMPERATURE: 97 F | HEART RATE: 88 BPM | BODY MASS INDEX: 24.29 KG/M2 | SYSTOLIC BLOOD PRESSURE: 162 MMHG | RESPIRATION RATE: 18 BRPM | WEIGHT: 132 LBS | HEIGHT: 62 IN

## 2018-12-27 DIAGNOSIS — M79.18 LEFT BUTTOCK PAIN: ICD-10-CM

## 2018-12-27 DIAGNOSIS — M25.552 LEFT HIP PAIN: Primary | ICD-10-CM

## 2018-12-27 DIAGNOSIS — I10 ESSENTIAL HYPERTENSION: ICD-10-CM
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
## 2018-12-27 RX ORDER — MELOXICAM 7.5 MG/1
7.5 TABLET ORAL DAILY
Qty: 15 TAB | Refills: 1 | Status: SHIPPED | OUTPATIENT
Start: 2018-12-27 | End: 2019-02-08

## 2018-12-27 RX ORDER — DEXTROMETHORPHAN HYDROBROMIDE, GUAIFENESIN 5; 100 MG/5ML; MG/5ML
650 LIQUID ORAL EVERY 8 HOURS
Qty: 60 TAB | Refills: 5 | Status: SHIPPED | OUTPATIENT
Start: 2018-12-27 | End: 2019-01-31 | Stop reason: SDUPTHER

## 2018-12-27 NOTE — PROGRESS NOTES
Health Maintenance Due   Topic Date Due    DTaP/Tdap/Td series (1 - Tdap) 08/15/1950    Shingrix Vaccine Age 49> (1 of 2) 08/15/1979       Chief Complaint   Patient presents with    Back Pain     left lower back into hip       1. Have you been to the ER, urgent care clinic since your last visit? Hospitalized since your last visit? No    2. Have you seen or consulted any other health care providers outside of the [address] [place] [address] since your last visit? Include any pap smears or colon screening. No    3) Do you have an Advance Directive on file? no    4) Are you interested in receiving information on Advance Directives? NO      Patient is accompanied by self I have received verbal consent from Tati Oliver to discuss any/all medical information while they are present in the room.

## 2018-12-31 NOTE — PROGRESS NOTES
HISTORY OF PRESENT ILLNESS Zo Burnett is a 80 y.o. female. Pt. comes in for f/u. Has multiple medical problems. Continues to have pain in the left lower back, hip and buttock area. Had a recent fall. Hip x-ray was reported as negative. Has not had any more falls but the pain is not going away. Tylenol is not helping a whole lot. BP is stable. Reports compliance with medications and diet. Med list and most recent labs/studies reviewed with pt. has not been as active because of pain. Reports no other new c/o. HPI Review of Systems Constitutional: Negative. HENT: Negative. Respiratory: Negative for shortness of breath. Cardiovascular: Negative for chest pain and leg swelling. Gastrointestinal: Negative. Genitourinary: Negative. Musculoskeletal: Positive for back pain, falls and joint pain. Skin: Negative. Neurological: Negative. Endo/Heme/Allergies: Negative. Psychiatric/Behavioral: Negative. Physical Exam  
Constitutional: She is oriented to person, place, and time. She appears well-developed and well-nourished. No distress. HENT:  
Head: Normocephalic and atraumatic. Mouth/Throat: Oropharynx is clear and moist.  
Eyes: Conjunctivae are normal.  
Neck: Normal range of motion. Neck supple. Cardiovascular: Normal rate, regular rhythm, normal heart sounds and intact distal pulses. No murmur heard. Pulmonary/Chest: Effort normal and breath sounds normal. No respiratory distress. Abdominal: Soft. Bowel sounds are normal. She exhibits no distension. Musculoskeletal: She exhibits tenderness (R hip/buttock). She exhibits no edema. DJD Walks with a limp because of pain Neurological: She is alert and oriented to person, place, and time. Coordination abnormal.  
Mild memory issues Skin: Skin is warm and dry. No rash noted. Psychiatric: She has a normal mood and affect. Her behavior is normal.  
Nursing note and vitals reviewed.  
 
 
ASSESSMENT and PLAN 
 Diagnoses and all orders for this visit: 1. Left hip pain 
-     REFERRAL TO ORTHOPEDICS 2. Fall, subsequent encounter 
-     Valorie 3. Left buttock pain 4. Essential hypertension Other orders 
-     acetaminophen (TYLENOL ARTHRITIS PAIN) 650 mg TbER; Take 1 Tab by mouth every eight (8) hours. -     meloxicam (MOBIC) 7.5 mg tablet; Take 1 Tab by mouth daily. Follow-up Disposition: 
Return in about 2 weeks (around 1/10/2019). lab results and schedule of future lab studies reviewed with patient 
reviewed diet, exercise and weight control 
reviewed medications and side effects in detail Explained to patient that recent x-ray did not show any fractures Fall precautions discussed She is not interested in physical therapy We will see if Tylenol and Mobic will help We will refer to orthopedic MD 
May need further imaging studies
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
## 2019-01-28 DIAGNOSIS — M25.551 RIGHT HIP PAIN: ICD-10-CM

## 2019-01-31 ENCOUNTER — OFFICE VISIT (OUTPATIENT)
Dept: INTERNAL MEDICINE CLINIC | Age: 84
End: 2019-01-31

## 2019-01-31 VITALS
HEART RATE: 93 BPM | DIASTOLIC BLOOD PRESSURE: 63 MMHG | SYSTOLIC BLOOD PRESSURE: 140 MMHG | OXYGEN SATURATION: 94 % | RESPIRATION RATE: 20 BRPM | TEMPERATURE: 97.7 F | HEIGHT: 62 IN | WEIGHT: 124.8 LBS | BODY MASS INDEX: 22.97 KG/M2

## 2019-01-31 DIAGNOSIS — R41.3 MEMORY IMPAIRMENT: ICD-10-CM

## 2019-01-31 DIAGNOSIS — M79.605 PAIN OF LEFT LOWER EXTREMITY: ICD-10-CM

## 2019-01-31 DIAGNOSIS — R26.9 GAIT DIFFICULTY: ICD-10-CM

## 2019-01-31 DIAGNOSIS — M25.552 LEFT HIP PAIN: Primary | ICD-10-CM

## 2019-01-31 DIAGNOSIS — I10 ESSENTIAL HYPERTENSION: ICD-10-CM

## 2019-01-31 RX ORDER — PREDNISONE 20 MG/1
20 TABLET ORAL
Qty: 5 TAB | Refills: 0 | Status: ON HOLD | OUTPATIENT
Start: 2019-01-31 | End: 2019-02-08

## 2019-01-31 RX ORDER — DEXTROMETHORPHAN HYDROBROMIDE, GUAIFENESIN 5; 100 MG/5ML; MG/5ML
650 LIQUID ORAL EVERY 8 HOURS
Qty: 60 TAB | Refills: 5 | Status: SHIPPED | OUTPATIENT
Start: 2019-01-31 | End: 2019-02-05

## 2019-01-31 RX ORDER — LIDOCAINE 50 MG/G
PATCH TOPICAL
Qty: 10 EACH | Refills: 1 | Status: SHIPPED | OUTPATIENT
Start: 2019-01-31 | End: 2019-02-05

## 2019-01-31 NOTE — PROGRESS NOTES
Health Maintenance Due Topic Date Due  
 DTaP/Tdap/Td series (1 - Tdap) 08/15/1950  Shingrix Vaccine Age 50> (1 of 2) 08/15/1979 Chief Complaint Patient presents with  Knee Pain Left knee  Hip Pain Left hip 1. Have you been to the ER, urgent care clinic since your last visit? Hospitalized since your last visit? No 
 
2. Have you seen or consulted any other health care providers outside of the [address] [place] [address] since your last visit? Include any pap smears or colon screening. No 
 
3) Do you have an Advance Directive on file? no 
 
4) Are you interested in receiving information on Advance Directives? NO Patient is accompanied by self I have received verbal consent from Sharlene Méndez to discuss any/all medical information while they are present in the room.

## 2019-01-31 NOTE — PROGRESS NOTES
HISTORY OF PRESENT ILLNESS Julián Garrison is a 80 y.o. female. Pt. comes in for f/u. Has multiple medical problems. She is a poor historian with underlying dementia. Reports continued pain in left lateral thigh/upper leg. Denies any recent trauma or falls. Saw NP recently. X-rays of lumbar spine and hip show some DJD but nothing acute. Pain is worse with weightbearing. No related neurological issues. No related GI or  problems. Patient lives by herself at [address] [place] [address]. Does not have family in town. Reports compliance with medications and diet. Med list and most recent labs/studies reviewed with pt. Reports no other new c/o. HPI Review of Systems Constitutional: Negative. HENT: Positive for hearing loss. Eyes: Negative. Respiratory: Negative. Negative for shortness of breath. Cardiovascular: Negative for chest pain and leg swelling. Gastrointestinal: Negative. Negative for abdominal pain. Genitourinary: Negative. Musculoskeletal: Positive for joint pain. Skin: Negative. Neurological: Negative. Endo/Heme/Allergies: Negative. Psychiatric/Behavioral: Positive for memory loss. Physical Exam  
Constitutional: She is oriented to person, place, and time. She appears well-developed and well-nourished. No distress. HENT:  
Head: Normocephalic and atraumatic. Mouth/Throat: Oropharynx is clear and moist.  
Eyes: Conjunctivae are normal.  
Neck: Normal range of motion. Neck supple. Cardiovascular: Normal rate, regular rhythm, normal heart sounds and intact distal pulses. No murmur heard. Pulmonary/Chest: Effort normal and breath sounds normal. No respiratory distress. Abdominal: Soft. Bowel sounds are normal. She exhibits no distension. Musculoskeletal: She exhibits tenderness (L lateral uper leg/thigh with wt bearing). She exhibits no edema. DJD Walks with a limp because of pain Neurological: She is alert and oriented to person, place, and time. Coordination abnormal.  
Mild memory issues Skin: Skin is warm and dry. No rash noted. Psychiatric: She has a normal mood and affect. Her behavior is normal.  
Nursing note and vitals reviewed. ASSESSMENT and PLAN Diagnoses and all orders for this visit: 1. Left hip pain 2. Pain of left lower extremity 3. Gait difficulty 4. Memory impairment 5. Essential hypertension Other orders -     predniSONE (DELTASONE) 20 mg tablet; Take 20 mg by mouth daily (with breakfast) for 5 days. -     lidocaine (LIDODERM) 5 %; Apply patch to the affected area/L thigh for 12 hours a day and remove for 12 hours a day. -     acetaminophen (TYLENOL ARTHRITIS PAIN) 650 mg TbER; Take 1 Tab by mouth every eight (8) hours. Follow-up Disposition: 
Return in about 1 week (around 2/7/2019). lab results and schedule of future lab studies reviewed with patient 
reviewed diet, exercise and weight control 
reviewed medications and side effects in detail Patient tells me she is not able to go and  her prescriptions We will arrange for medications to be picked up for her I have told her that she may need to see an orthopedic MD if not better in a few days I contacted her daughter on phone and discussed my findings and plan Also discussed my concerns since her memory is getting worse I recommend getting some help at home or moving to assisted living

## 2019-02-04 PROCEDURE — 99285 EMERGENCY DEPT VISIT HI MDM: CPT

## 2019-02-05 ENCOUNTER — APPOINTMENT (OUTPATIENT)
Dept: GENERAL RADIOLOGY | Age: 84
DRG: 535 | End: 2019-02-05
Attending: EMERGENCY MEDICINE
Payer: MEDICARE

## 2019-02-05 ENCOUNTER — HOSPITAL ENCOUNTER (INPATIENT)
Age: 84
LOS: 3 days | Discharge: NURSING, REHAB OR LONG-TERM CARE | DRG: 535 | End: 2019-02-08
Attending: EMERGENCY MEDICINE | Admitting: INTERNAL MEDICINE
Payer: MEDICARE

## 2019-02-05 ENCOUNTER — APPOINTMENT (OUTPATIENT)
Dept: CT IMAGING | Age: 84
DRG: 535 | End: 2019-02-05
Attending: PHYSICIAN ASSISTANT
Payer: MEDICARE

## 2019-02-05 DIAGNOSIS — S32.82XA MULTIPLE CLOSED FRACTURES OF PELVIS WITHOUT DISRUPTION OF PELVIC RING, INITIAL ENCOUNTER (HCC): Primary | ICD-10-CM

## 2019-02-05 PROBLEM — S32.599A PUBIC RAMUS FRACTURE (HCC): Status: ACTIVE | Noted: 2019-02-05

## 2019-02-05 LAB
ALBUMIN SERPL-MCNC: 3 G/DL (ref 3.5–5)
ALBUMIN/GLOB SERPL: 0.7 {RATIO} (ref 1.1–2.2)
ALP SERPL-CCNC: 158 U/L (ref 45–117)
ALT SERPL-CCNC: 20 U/L (ref 12–78)
ANION GAP SERPL CALC-SCNC: 8 MMOL/L (ref 5–15)
APPEARANCE UR: ABNORMAL
AST SERPL-CCNC: 19 U/L (ref 15–37)
BACTERIA URNS QL MICRO: ABNORMAL /HPF
BASOPHILS # BLD: 0 K/UL (ref 0–0.1)
BASOPHILS NFR BLD: 0 % (ref 0–1)
BILIRUB SERPL-MCNC: 0.5 MG/DL (ref 0.2–1)
BILIRUB UR QL: NEGATIVE
BUN SERPL-MCNC: 29 MG/DL (ref 6–20)
BUN/CREAT SERPL: 38 (ref 12–20)
CALCIUM SERPL-MCNC: 8.7 MG/DL (ref 8.5–10.1)
CHLORIDE SERPL-SCNC: 108 MMOL/L (ref 97–108)
CO2 SERPL-SCNC: 25 MMOL/L (ref 21–32)
COLOR UR: ABNORMAL
CREAT SERPL-MCNC: 0.76 MG/DL (ref 0.55–1.02)
DIFFERENTIAL METHOD BLD: ABNORMAL
EOSINOPHIL # BLD: 0 K/UL (ref 0–0.4)
EOSINOPHIL NFR BLD: 0 % (ref 0–7)
EPITH CASTS URNS QL MICRO: ABNORMAL /LPF
ERYTHROCYTE [DISTWIDTH] IN BLOOD BY AUTOMATED COUNT: 13.2 % (ref 11.5–14.5)
GLOBULIN SER CALC-MCNC: 4.3 G/DL (ref 2–4)
GLUCOSE SERPL-MCNC: 123 MG/DL (ref 65–100)
GLUCOSE UR STRIP.AUTO-MCNC: NEGATIVE MG/DL
HCT VFR BLD AUTO: 35.6 % (ref 35–47)
HGB BLD-MCNC: 12 G/DL (ref 11.5–16)
HGB UR QL STRIP: NEGATIVE
IMM GRANULOCYTES # BLD AUTO: 0 K/UL (ref 0–0.04)
IMM GRANULOCYTES NFR BLD AUTO: 1 % (ref 0–0.5)
KETONES UR QL STRIP.AUTO: NEGATIVE MG/DL
LEUKOCYTE ESTERASE UR QL STRIP.AUTO: ABNORMAL
LYMPHOCYTES # BLD: 1.6 K/UL (ref 0.8–3.5)
LYMPHOCYTES NFR BLD: 20 % (ref 12–49)
MCH RBC QN AUTO: 30.8 PG (ref 26–34)
MCHC RBC AUTO-ENTMCNC: 33.7 G/DL (ref 30–36.5)
MCV RBC AUTO: 91.3 FL (ref 80–99)
MONOCYTES # BLD: 0.8 K/UL (ref 0–1)
MONOCYTES NFR BLD: 10 % (ref 5–13)
NEUTS SEG # BLD: 5.6 K/UL (ref 1.8–8)
NEUTS SEG NFR BLD: 69 % (ref 32–75)
NITRITE UR QL STRIP.AUTO: POSITIVE
NRBC # BLD: 0 K/UL (ref 0–0.01)
NRBC BLD-RTO: 0 PER 100 WBC
PH UR STRIP: 5.5 [PH] (ref 5–8)
PLATELET # BLD AUTO: 247 K/UL (ref 150–400)
PMV BLD AUTO: 10.8 FL (ref 8.9–12.9)
POTASSIUM SERPL-SCNC: 3.6 MMOL/L (ref 3.5–5.1)
PROT SERPL-MCNC: 7.3 G/DL (ref 6.4–8.2)
PROT UR STRIP-MCNC: ABNORMAL MG/DL
RBC # BLD AUTO: 3.9 M/UL (ref 3.8–5.2)
RBC #/AREA URNS HPF: ABNORMAL /HPF (ref 0–5)
SODIUM SERPL-SCNC: 141 MMOL/L (ref 136–145)
SP GR UR REFRACTOMETRY: 1.03 (ref 1–1.03)
UA: UC IF INDICATED,UAUC: ABNORMAL
UROBILINOGEN UR QL STRIP.AUTO: 0.2 EU/DL (ref 0.2–1)
WBC # BLD AUTO: 8.1 K/UL (ref 3.6–11)
WBC URNS QL MICRO: ABNORMAL /HPF (ref 0–4)

## 2019-02-05 PROCEDURE — 80053 COMPREHEN METABOLIC PANEL: CPT

## 2019-02-05 PROCEDURE — 87086 URINE CULTURE/COLONY COUNT: CPT

## 2019-02-05 PROCEDURE — 85025 COMPLETE CBC W/AUTO DIFF WBC: CPT

## 2019-02-05 PROCEDURE — 74011000258 HC RX REV CODE- 258: Performed by: INTERNAL MEDICINE

## 2019-02-05 PROCEDURE — 97116 GAIT TRAINING THERAPY: CPT

## 2019-02-05 PROCEDURE — 87186 SC STD MICRODIL/AGAR DIL: CPT

## 2019-02-05 PROCEDURE — 65270000029 HC RM PRIVATE

## 2019-02-05 PROCEDURE — 73552 X-RAY EXAM OF FEMUR 2/>: CPT

## 2019-02-05 PROCEDURE — 87077 CULTURE AEROBIC IDENTIFY: CPT

## 2019-02-05 PROCEDURE — 74011250637 HC RX REV CODE- 250/637: Performed by: EMERGENCY MEDICINE

## 2019-02-05 PROCEDURE — 74011250637 HC RX REV CODE- 250/637: Performed by: INTERNAL MEDICINE

## 2019-02-05 PROCEDURE — 73502 X-RAY EXAM HIP UNI 2-3 VIEWS: CPT

## 2019-02-05 PROCEDURE — 97161 PT EVAL LOW COMPLEX 20 MIN: CPT

## 2019-02-05 PROCEDURE — 74011250636 HC RX REV CODE- 250/636: Performed by: INTERNAL MEDICINE

## 2019-02-05 PROCEDURE — 72192 CT PELVIS W/O DYE: CPT

## 2019-02-05 PROCEDURE — 81001 URINALYSIS AUTO W/SCOPE: CPT

## 2019-02-05 PROCEDURE — 36415 COLL VENOUS BLD VENIPUNCTURE: CPT

## 2019-02-05 RX ORDER — THERA TABS 400 MCG
1 TAB ORAL DAILY
Status: DISCONTINUED | OUTPATIENT
Start: 2019-02-05 | End: 2019-02-08 | Stop reason: HOSPADM

## 2019-02-05 RX ORDER — AMLODIPINE BESYLATE 5 MG/1
5 TABLET ORAL DAILY
Status: DISCONTINUED | OUTPATIENT
Start: 2019-02-05 | End: 2019-02-08 | Stop reason: HOSPADM

## 2019-02-05 RX ORDER — MELATONIN
1000 DAILY
Status: DISCONTINUED | OUTPATIENT
Start: 2019-02-05 | End: 2019-02-08 | Stop reason: HOSPADM

## 2019-02-05 RX ORDER — ENOXAPARIN SODIUM 100 MG/ML
30 INJECTION SUBCUTANEOUS EVERY 24 HOURS
Status: DISCONTINUED | OUTPATIENT
Start: 2019-02-05 | End: 2019-02-08 | Stop reason: HOSPADM

## 2019-02-05 RX ORDER — SODIUM CHLORIDE 0.9 % (FLUSH) 0.9 %
5-40 SYRINGE (ML) INJECTION EVERY 8 HOURS
Status: DISCONTINUED | OUTPATIENT
Start: 2019-02-05 | End: 2019-02-08 | Stop reason: HOSPADM

## 2019-02-05 RX ORDER — CALCIUM CARBONATE 500(1250)
500 TABLET ORAL DAILY
Status: DISCONTINUED | OUTPATIENT
Start: 2019-02-05 | End: 2019-02-08 | Stop reason: HOSPADM

## 2019-02-05 RX ORDER — ONDANSETRON 2 MG/ML
4 INJECTION INTRAMUSCULAR; INTRAVENOUS
Status: DISCONTINUED | OUTPATIENT
Start: 2019-02-05 | End: 2019-02-08 | Stop reason: HOSPADM

## 2019-02-05 RX ORDER — ACETAMINOPHEN 325 MG/1
650 TABLET ORAL
Status: DISCONTINUED | OUTPATIENT
Start: 2019-02-05 | End: 2019-02-08 | Stop reason: HOSPADM

## 2019-02-05 RX ORDER — ENOXAPARIN SODIUM 100 MG/ML
40 INJECTION SUBCUTANEOUS EVERY 24 HOURS
Status: DISCONTINUED | OUTPATIENT
Start: 2019-02-05 | End: 2019-02-05 | Stop reason: DRUGHIGH

## 2019-02-05 RX ORDER — TRAMADOL HYDROCHLORIDE 50 MG/1
50 TABLET ORAL
Status: COMPLETED | OUTPATIENT
Start: 2019-02-05 | End: 2019-02-05

## 2019-02-05 RX ORDER — SODIUM CHLORIDE 0.9 % (FLUSH) 0.9 %
5-40 SYRINGE (ML) INJECTION AS NEEDED
Status: DISCONTINUED | OUTPATIENT
Start: 2019-02-05 | End: 2019-02-08 | Stop reason: HOSPADM

## 2019-02-05 RX ADMIN — VITAMIN D, TAB 1000IU (100/BT) 1000 UNITS: 25 TAB at 09:43

## 2019-02-05 RX ADMIN — AMLODIPINE BESYLATE 5 MG: 5 TABLET ORAL at 09:42

## 2019-02-05 RX ADMIN — Medication 10 ML: at 09:45

## 2019-02-05 RX ADMIN — Medication 10 ML: at 16:59

## 2019-02-05 RX ADMIN — CALCIUM 500 MG: 500 TABLET ORAL at 09:43

## 2019-02-05 RX ADMIN — Medication 10 ML: at 20:40

## 2019-02-05 RX ADMIN — ENOXAPARIN SODIUM 30 MG: 30 INJECTION SUBCUTANEOUS at 09:43

## 2019-02-05 RX ADMIN — Medication 10 ML: at 20:41

## 2019-02-05 RX ADMIN — ACETAMINOPHEN 650 MG: 325 TABLET ORAL at 09:56

## 2019-02-05 RX ADMIN — TRAMADOL HYDROCHLORIDE 50 MG: 50 TABLET, FILM COATED ORAL at 01:16

## 2019-02-05 RX ADMIN — THERA TABS 1 TABLET: TAB at 09:42

## 2019-02-05 RX ADMIN — ACETAMINOPHEN 650 MG: 325 TABLET ORAL at 20:39

## 2019-02-05 RX ADMIN — CEFTRIAXONE SODIUM 1 G: 1 INJECTION, POWDER, FOR SOLUTION INTRAMUSCULAR; INTRAVENOUS at 16:58

## 2019-02-05 NOTE — CONSULTS
ORTHOPAEDIC CONSULT NOTE    Subjective:     Date of Consultation:  February 5, 2019      Cristel Abel is a 80 y.o. female who is being seen for pelvic fxr. Ongoing left hip pain greater than 1 month. Per chart review, pt had a fall from a stand while changing a light bulb in 11/2018. Chart review reveals pt was seen by her PCP on 11/29 following the fall with complaints of right hip and had a negative XR at that time. Chart review reveals pt was seen again by her PCP on 12/27 and 1/31 for left hip pain, following the prior fall in November with negative XR. She states she continues to have pain that is exacerbated with bearing weight    Pt states she had some left anterior thigh pain. .... but currently has minimal discomfort - better after APAP    Ambulates at baseline with walker. Patient Active Problem List    Diagnosis Date Noted    Pubic ramus fracture ([address] 02/05/2019    ACP (advance care planning) 07/19/2018    Bilateral finger numbness 04/05/2018    Chronic pain of right knee 07/06/2017    Baker's cyst 07/06/2017    Primary osteoarthritis of both knees 07/06/2017    Gait difficulty 07/06/2017    Memory impairment 07/06/2017    OP (osteoporosis) 07/07/2016    Essential hypertension 03/08/2016     History reviewed. No pertinent family history. Social History     Tobacco Use    Smoking status: Never Smoker    Smokeless tobacco: Never Used   Substance Use Topics    Alcohol use: No     Past Medical History:   Diagnosis Date    Baker's cyst of knee     Bone spur     R shoulder    Hypertension     Knee pain, right     Osteoporosis     Ovarian cyst     Vegetarian diet       Past Surgical History:   Procedure Laterality Date    HX ANA AND BSO        Prior to Admission medications    Medication Sig Start Date End Date Taking? Authorizing Provider   predniSONE (DELTASONE) 20 mg tablet Take 20 mg by mouth daily (with breakfast) for 5 days.  1/31/19 2/5/19 Yes Jeffery Kenyon, DO   meloxicam (MOBIC) 7.5 mg tablet Take 1 Tab by mouth daily. 18  Yes Lico Martínez, DO   amLODIPine (NORVASC) 5 mg tablet TAKE 1 TABLET BY MOUTH DAILY. 18  Yes Lico Martínez, DO   multivitamin (ONE A DAY) tablet Take 1 Tab by mouth daily. Yes Provider, Historical   cholecalciferol (VITAMIN D3) 1,000 unit tablet Take  by mouth daily. Yes Provider, Historical   calcium 500 mg tab Take  by mouth. Yes Provider, Historical     Current Facility-Administered Medications   Medication Dose Route Frequency    sodium chloride (NS) flush 5-40 mL  5-40 mL IntraVENous Q8H    sodium chloride (NS) flush 5-40 mL  5-40 mL IntraVENous PRN    amLODIPine (NORVASC) tablet 5 mg  5 mg Oral DAILY    calcium carbonate (OS-KENYATTA) tablet 500 mg [elemental]  500 mg Oral DAILY    therapeutic multivitamin (THERAGRAN) tablet 1 Tab  1 Tab Oral DAILY    cholecalciferol (VITAMIN D3) tablet 1,000 Units  1,000 Units Oral DAILY    sodium chloride (NS) flush 5-40 mL  5-40 mL IntraVENous Q8H    sodium chloride (NS) flush 5-40 mL  5-40 mL IntraVENous PRN    acetaminophen (TYLENOL) tablet 650 mg  650 mg Oral Q6H PRN    ondansetron (ZOFRAN) injection 4 mg  4 mg IntraVENous Q8H PRN    enoxaparin (LOVENOX) injection 30 mg  30 mg SubCUTAneous Q24H      No Known Allergies     Review of Systems:  A comprehensive review of systems was negative except for that written in the HPI.     Mental Status: no dementia    Objective:     Patient Vitals for the past 8 hrs:   BP Temp Pulse Resp SpO2   19 0818 121/82 98.7 °F (37.1 °C) 72 18 95 %   19 0557 184/53 98.4 °F (36.9 °C) 79 16 96 %   19 0524     91 %   19 0430     93 %   19 0428     (!) 87 %   19 0400 (!) 140/94 98.5 °F (36.9 °C)  16 91 %     Temp (24hrs), Av.4 °F (36.9 °C), Min:97.8 °F (36.6 °C), Max:98.7 °F (37.1 °C)      Gen: Well-developed,  in no acute distress   HEENT: Pink conjunctivae, hearing intact to voice, moist mucous membranes   Neck: Supple  Resp: No respiratory distress   Card: RRR, palpable distal pulse-equal bilaterally, birsk cap refill all distal digits   Abd: non-distended  Musc: NO pelvic or LE TTP, neg log roll. Intact SLR, (pt actively waving LLE in the air w/o difficulty or discomfort)  excellent strength MMT of the LE bilat. NO sign of LE VTE. Skin: No skin breakdown noted. Skin warm, pink, dry  Neuro: Cranial nerves are grossly intact, no focal motor weakness, follows commands appropriately   Psych: Good insight, oriented to person, place and time, alert    Imaging Review:   --XR FEMUR LT 2 V  INDICATION: Status post fall with left leg pain. COMPARISON: None. FINDINGS: Two views of the left femur demonstrate displaced left superior and  inferior pubic rami fractures. There is also a left sacral ala fracture. IMPRESSION: Displaced left superior and inferior pubic rami fractures. Sacral  ala fracture. --CT PELV WO CONT   INDICATION: pelvic fractures for characterization   COMPARISON: None.   CONTRAST: None.   TECHNIQUE:   Multislice helical CT was performed from the iliac crest to the symphysis pubis  without intravenous contrast administration Oral contrast was not administered. Coronal and sagittal reformations were generated. CT dose reduction was  achieved through use of a standardized protocol tailored for this examination  and automatic exposure control for dose modulation. Imaging extended up into the  abdomen, technical error.  FINDING:   Nonobstructing left renal calculi. Sigmoid diverticulosis. Prior hysterectomy. There is no pelvic mass or  adenopathy. There is no pelvic ascites or fluid collection.   Acute left superior and inferior pubic rami fractures, displaced by at least 1  cm, with some overriding of the inferior pubic ramus. Associated medial pelvic  wall hematoma with 9.2 x 23.9 x 10 mm hyperintensity centrally (image 71). Age-indeterminate H shaped sacral fractures.  In addition, acute mild superior  endplate L4 burst fracture with posterior displacement of a 6 mm triangular  corner of the superior posterior vertebral body into the canal (sagittal image77).  IMPRESSION:   Acute mild L4 versus fracture  Age-indeterminate H-shaped sacral fractures. Acute left superior and inferior pubic rami fractures with displacement. Large  associated hematoma with acute hemorrhagic component. Follow-up CT or CT  angiography is recommended to document stability/tamponade of this hematoma.     This result was called by me to Re Ocasio RN at 1601 hours. [id number]       Labs:   Recent Results (from the past 24 hour(s))   CBC WITH AUTOMATED DIFF    Collection Time: 02/05/19  2:30 AM   Result Value Ref Range    WBC 8.1 3.6 - 11.0 K/uL    RBC 3.90 3.80 - 5.20 M/uL    HGB 12.0 11.5 - 16.0 g/dL    HCT 35.6 35.0 - 47.0 %    MCV 91.3 80.0 - 99.0 FL    MCH 30.8 26.0 - 34.0 PG    MCHC 33.7 30.0 - 36.5 g/dL    RDW 13.2 11.5 - 14.5 %    PLATELET 543 736 - 515 K/uL    MPV 10.8 8.9 - 12.9 FL    NRBC 0.0 0  WBC    ABSOLUTE NRBC 0.00 0.00 - 0.01 K/uL    NEUTROPHILS 69 32 - 75 %    LYMPHOCYTES 20 12 - 49 %    MONOCYTES 10 5 - 13 %    EOSINOPHILS 0 0 - 7 %    BASOPHILS 0 0 - 1 %    IMMATURE GRANULOCYTES 1 (H) 0.0 - 0.5 %    ABS. NEUTROPHILS 5.6 1.8 - 8.0 K/UL    ABS. LYMPHOCYTES 1.6 0.8 - 3.5 K/UL    ABS. MONOCYTES 0.8 0.0 - 1.0 K/UL    ABS. EOSINOPHILS 0.0 0.0 - 0.4 K/UL    ABS. BASOPHILS 0.0 0.0 - 0.1 K/UL    ABS. IMM.  GRANS. 0.0 0.00 - 0.04 K/UL    DF AUTOMATED     METABOLIC PANEL, COMPREHENSIVE    Collection Time: 02/05/19  2:30 AM   Result Value Ref Range    Sodium 141 136 - 145 mmol/L    Potassium 3.6 3.5 - 5.1 mmol/L    Chloride 108 97 - 108 mmol/L    CO2 25 21 - 32 mmol/L    Anion gap 8 5 - 15 mmol/L    Glucose 123 (H) 65 - 100 mg/dL    BUN 29 (H) 6 - 20 MG/DL    Creatinine 0.76 0.55 - 1.02 MG/DL    BUN/Creatinine ratio 38 (H) 12 - 20      GFR est AA >60 >60 ml/min/1.73m2    GFR est non-AA >60 >60 ml/min/1.73m2    Calcium 8.7 8.5 - 10.1 MG/DL    Bilirubin, total 0.5 0.2 - 1.0 MG/DL    ALT (SGPT) 20 12 - 78 U/L    AST (SGOT) 19 15 - 37 U/L    Alk. phosphatase 158 (H) 45 - 117 U/L    Protein, total 7.3 6.4 - 8.2 g/dL    Albumin 3.0 (L) 3.5 - 5.0 g/dL    Globulin 4.3 (H) 2.0 - 4.0 g/dL    A-G Ratio 0.7 (L) 1.1 - 2.2     URINALYSIS W/ REFLEX CULTURE    Collection Time: 02/05/19  4:15 AM   Result Value Ref Range    Color YELLOW/STRAW      Appearance CLOUDY (A) CLEAR      Specific gravity 1.026 1.003 - 1.030      pH (UA) 5.5 5.0 - 8.0      Protein TRACE (A) NEG mg/dL    Glucose NEGATIVE  NEG mg/dL    Ketone NEGATIVE  NEG mg/dL    Bilirubin NEGATIVE  NEG      Blood NEGATIVE  NEG      Urobilinogen 0.2 0.2 - 1.0 EU/dL    Nitrites POSITIVE (A) NEG      Leukocyte Esterase MODERATE (A) NEG      WBC 20-50 0 - 4 /hpf    RBC 0-5 0 - 5 /hpf    Epithelial cells FEW FEW /lpf    Bacteria 4+ (A) NEG /hpf    UA:UC IF INDICATED URINE CULTURE ORDERED (A) CNI           Impression:     Patient Active Problem List    Diagnosis Date Noted    Pubic ramus fracture (HCC) 02/05/2019    ACP (advance care planning) 07/19/2018    Bilateral finger numbness 04/05/2018    Chronic pain of right knee 07/06/2017    Baker's cyst 07/06/2017    Primary osteoarthritis of both knees 07/06/2017    Gait difficulty 07/06/2017    Memory impairment 07/06/2017    OP (osteoporosis) 07/07/2016    Essential hypertension 03/08/2016     Active Problems:    Pubic ramus fracture (HCC) (2/5/2019)        Plan:     -  Pt w/o sign complaint today    -  Left sup/inferior rami fracture  -  Acute mild superiorendplate L4 burst fracture with posterior displacement of a 6 mm triangular   corner of the superior posterior vertebral body    -  Additional recommendations to follow       Dr. Dash Sanches aware and agrees with plan as above.         Dusty Shen PA-C  [employer]

## 2019-02-05 NOTE — ED NOTES
Report given to Ernesto Gillette. Updated regarding pt status, diagnosis, and plan. Answered all questions to best of knowledge.

## 2019-02-05 NOTE — PROGRESS NOTES
CM attempted to complete initial assessment, discharge planning. Pt stated she is concerned with where her debit card is. Pt declined to have this CM assist with looking through her belongings. Pt declined to complete assessment further at this time. CM will continue to attempt to meet with pt re: assessment, discharge planning as needed. Tano Smith, MSW, LSW Supervisee in Social Work [facility] [phone]

## 2019-02-05 NOTE — PROGRESS NOTES
UA with pyuria and bacteria Denies urinary symptoms but she is anxious and worried about her credit cards. Delusional? 
Will start Rocephin

## 2019-02-05 NOTE — ED PROVIDER NOTES
EMERGENCY DEPARTMENT HISTORY AND PHYSICAL EXAM 
 
 
Date: 2/5/2019 Patient Name: Cassi Flood History of Presenting Illness Chief Complaint Patient presents with  
 Hip Pain Left anterior hip pain for 10days, reports fall initially. Seen by PCP on 1/31, no relief from lidocaine patch History Provided By: Patient HPI: Cassi Flood, 80 y.o. female with PMHx significant for HTN and osteoporosis, presents via EMS to the ED with cc of ongoing left hip pain greater than 1 month. Per chart review, pt had a fall from a stand while changing a light bulb in 11/2018. Chart review reveals pt was seen by her PCP on 11/29 following the fall with complaints of right hip and had a negative XR at that time. Chart review reveals pt was seen again by her PCP on 12/27 and 1/31 for left hip pain, following the prior fall in November with negative XR. She states she continues to have pain that is exacerbated with bearing weight. Pt denies any new fall or injury that could contribute. She is ambulatory with a walker at baseline. Per chart review, pt was recommended to use lidocaine patches for her sx's by her PCP, however it is unclear if pt has used them. She specifically denies any recent fever, chills, nausea, vomiting, diarrhea, abd pain, CP, SOB, lightheadedness, dizziness, numbness, weakness, tingling, BLE swelling, HA, heart palpitations, urinary sxs, changes in BM, changes in PO intake, melena, hematochezia, cough, or congestion. Allergies: none PMHx: Significant for HTN, osteoporosis PSHx: Significant for none Social Hx: - tobacco, - EtOH, - Illicit Drugs There are no other complaints, changes, or physical findings at this time. PCP: Cecelia Jackson DO 
 
Current Facility-Administered Medications Medication Dose Route Frequency Provider Last Rate Last Dose  sodium chloride (NS) flush 5-40 mL  5-40 mL IntraVENous Anjali Metzger MD      
  sodium chloride (NS) flush 5-40 mL  5-40 mL IntraVENous PRN Anjali Elias MD      
 
Current Outpatient Medications Medication Sig Dispense Refill  predniSONE (DELTASONE) 20 mg tablet Take 20 mg by mouth daily (with breakfast) for 5 days. 5 Tab 0  
 meloxicam (MOBIC) 7.5 mg tablet Take 1 Tab by mouth daily. 15 Tab 1  
 amLODIPine (NORVASC) 5 mg tablet TAKE 1 TABLET BY MOUTH DAILY. 30 Tab 5  
 multivitamin (ONE A DAY) tablet Take 1 Tab by mouth daily.  cholecalciferol (VITAMIN D3) 1,000 unit tablet Take  by mouth daily.  calcium 500 mg tab Take  by mouth. Past History Past Medical History: 
Past Medical History:  
Diagnosis Date  Baker's cyst of knee  Bone spur R shoulder  Hypertension  Knee pain, right  Osteoporosis  Ovarian cyst   
 Vegetarian diet Past Surgical History: 
Past Surgical History:  
Procedure Laterality Date  HX ANA AND BSO Family History: 
History reviewed. No pertinent family history. Social History: 
Social History Tobacco Use  Smoking status: Never Smoker  Smokeless tobacco: Never Used Substance Use Topics  Alcohol use: No  
 Drug use: No  
 
 
Allergies: 
No Known Allergies Review of Systems Review of Systems Constitutional: Negative. Negative for appetite change, chills and fever. HENT: Negative. Negative for congestion. Eyes: Negative. Respiratory: Negative. Negative for cough and shortness of breath. Cardiovascular: Negative. Negative for chest pain, palpitations and leg swelling. Gastrointestinal: Negative. Negative for abdominal pain, blood in stool, constipation, diarrhea, nausea and vomiting. Genitourinary: Negative. Negative for dysuria and frequency. Musculoskeletal: Positive for arthralgias (left hip) and gait problem. Skin: Negative. Neurological: Negative for dizziness, weakness, light-headedness, numbness and headaches. Psychiatric/Behavioral: Negative. All other systems reviewed and are negative. Physical Exam  
General appearance - well nourished, well appearing, and in no distress Eyes - pupils equal and reactive, extraocular eye movements intact ENT - mucous membranes moist, pharynx normal without lesions Neck - supple, no significant adenopathy; non-tender to palpation Chest - clear to auscultation, no wheezes, rales or rhonchi; non-tender to palpation Heart - normal rate and regular rhythm, S1 and S2 normal, no murmurs noted Abdomen - soft, nontender, nondistended, no masses or organomegaly Musculoskeletal - tenderness over her left hip, but seems to have full ROM, no deformity or swelling Extremities - peripheral pulses normal, no pedal edema Skin - normal coloration and turgor, no rashes Neurological - alert, oriented x3, normal speech, no focal findings or movement disorder noted Diagnostic Study Results Labs - Recent Results (from the past 12 hour(s)) CBC WITH AUTOMATED DIFF Collection Time: 02/05/19  2:30 AM  
Result Value Ref Range WBC 8.1 3.6 - 11.0 K/uL  
 RBC 3.90 3.80 - 5.20 M/uL  
 HGB 12.0 11.5 - 16.0 g/dL HCT 35.6 35.0 - 47.0 % MCV 91.3 80.0 - 99.0 FL  
 MCH 30.8 26.0 - 34.0 PG  
 MCHC 33.7 30.0 - 36.5 g/dL  
 RDW 13.2 11.5 - 14.5 % PLATELET 565 166 - 730 K/uL MPV 10.8 8.9 - 12.9 FL  
 NRBC 0.0 0  WBC ABSOLUTE NRBC 0.00 0.00 - 0.01 K/uL NEUTROPHILS 69 32 - 75 % LYMPHOCYTES 20 12 - 49 % MONOCYTES 10 5 - 13 % EOSINOPHILS 0 0 - 7 % BASOPHILS 0 0 - 1 % IMMATURE GRANULOCYTES 1 (H) 0.0 - 0.5 % ABS. NEUTROPHILS 5.6 1.8 - 8.0 K/UL  
 ABS. LYMPHOCYTES 1.6 0.8 - 3.5 K/UL  
 ABS. MONOCYTES 0.8 0.0 - 1.0 K/UL  
 ABS. EOSINOPHILS 0.0 0.0 - 0.4 K/UL  
 ABS. BASOPHILS 0.0 0.0 - 0.1 K/UL  
 ABS. IMM. GRANS. 0.0 0.00 - 0.04 K/UL  
 DF AUTOMATED METABOLIC PANEL, COMPREHENSIVE Collection Time: 02/05/19  2:30 AM  
Result Value Ref Range Sodium 141 136 - 145 mmol/L Potassium 3.6 3.5 - 5.1 mmol/L Chloride 108 97 - 108 mmol/L  
 CO2 25 21 - 32 mmol/L Anion gap 8 5 - 15 mmol/L Glucose 123 (H) 65 - 100 mg/dL BUN 29 (H) 6 - 20 MG/DL Creatinine 0.76 0.55 - 1.02 MG/DL  
 BUN/Creatinine ratio 38 (H) 12 - 20 GFR est AA >60 >60 ml/min/1.73m2 GFR est non-AA >60 >60 ml/min/1.73m2 Calcium 8.7 8.5 - 10.1 MG/DL Bilirubin, total 0.5 0.2 - 1.0 MG/DL  
 ALT (SGPT) 20 12 - 78 U/L  
 AST (SGOT) 19 15 - 37 U/L Alk. phosphatase 158 (H) 45 - 117 U/L Protein, total 7.3 6.4 - 8.2 g/dL Albumin 3.0 (L) 3.5 - 5.0 g/dL Globulin 4.3 (H) 2.0 - 4.0 g/dL A-G Ratio 0.7 (L) 1.1 - 2.2 Radiologic Studies -  
XR FEMUR LT 2 V Final Result IMPRESSION: Displaced left superior and inferior pubic rami fractures. Sacral  
ala fracture. XR HIP LT W OR WO PELV 2-3 VWS Final Result IMPRESSION: Displaced left superior and inferior pubic rami fractures. Sacral  
ala fracture. CT Results  (Last 48 hours) None CXR Results  (Last 48 hours) None Medical Decision Making I am the first provider for this patient. I reviewed the vital signs, available nursing notes, past medical history, past surgical history, family history and social history. Vital Signs-Reviewed the patient's vital signs. Patient Vitals for the past 12 hrs: 
 Temp Pulse Resp BP SpO2  
02/05/19 0400   16 (!) 140/94 91 % 02/04/19 2109 97.8 °F (36.6 °C) 85 16 146/63 97 % Pulse Oximetry Analysis - 97% on RA Records Reviewed: Nursing Notes, Old Medical Records, Previous Radiology Studies and Previous Laboratory Studies Provider Notes (Medical Decision Making): DDx: fracture, contusion, muscle strain ED Course:  
Initial assessment performed.  The patients presenting problems have been discussed, and they are in agreement with the care plan formulated and outlined with them. I have encouraged them to ask questions as they arise throughout their visit. Medications Given in the ED: 
Medications  
sodium chloride (NS) flush 5-40 mL (not administered)  
sodium chloride (NS) flush 5-40 mL (not administered)  
traMADol (ULTRAM) tablet 50 mg (50 mg Oral Given 2/5/19 0116) CONSULT NOTE:  
4:22 AM 
Claribel Escobedo MD spoke with Osiris Hurd MD 
Specialty: Hospitalist 
Discussed pt's hx, disposition, and available diagnostic and imaging results. Reviewed care plans. Consultant will evaluate pt for admission. Written by Viky Owens ED Scribe, as dictated by Claribel Escobedo MD. Critical Care Time:  
none Disposition: 
ADMIT NOTE: 
4:23 AM 
The patient is being admitted to the hospital by Osiris Hurd MD.  The results of their tests and reasons for their admission have been discussed with the patient and/or available family. They convey agreement and understanding for the need to be admitted and for their admission diagnosis. PLAN: 
1. Admit to hospitalist   
 
Diagnosis Clinical Impression: 1. Multiple closed fractures of pelvis without disruption of pelvic ring, initial encounter ([facility] [address] Attestations: This note is prepared by Viky Owens, acting as Scribe for Anjali Hassan MD. Claribel Escobedo MD: The scribe's documentation has been prepared under my direction and personally reviewed by me in its entirety. I confirm that the note above accurately reflects all work, treatment, procedures, and medical decision making performed by me. This note will not be viewable in [address]

## 2019-02-05 NOTE — ED NOTES
Pt to ED via EMS for c/o left leg/hip pain since 1/31. Pt displays full ROM to leg but states she cannot bear weight.

## 2019-02-05 NOTE — ED NOTES
TRANSFER - IN REPORT: 
 
Verbal report received from [facility] (name) on [place]. Report consisted of patients Situation, Background, Assessment and  
Recommendations(SBAR). Information from the following report(s) SBAR and ED Summary was reviewed with the receiving nurse. Opportunity for questions and clarification was provided. Assumed care of pt currently resting on stretcher Pt alert oriented Pt remains on monitor x 2 Pt at this time resting in house coat Pt wishing to stay in gown from home.

## 2019-02-05 NOTE — H&P
Hospitalist Admission NoteNAME: Barrett Bragg :  8/15/1929 MRN:  [id number] Date/Time:  2019 6:26 AM 
 
Patient PCP: Antoinette Lynn DO 
______________________________________________________________________ Given the patient's current clinical presentation, I have a high level of concern for decompensation if discharged from the emergency department. Complex decision making was performed, which includes reviewing the patient's available past medical records, laboratory results, and x-ray films. My assessment of this patient's clinical condition and my plan of care is as follows. Assessment / Plan: 
Acute displaced left superior and inferior pubic rami fracture and sacral poly fracture Admit patient to [facility] unit Physical therapy consultation Start patient on pain orthopedic consultation Hypertension Continue home antihypertensive medication Osteoporosis Continue calcium and vitamin D supplement Code Status: full Surrogate Decision Maker:daughter DVT Prophylaxis: Lovenox GI Prophylaxis: not indicated Baseline: independent Subjective: CHIEF COMPLAINT: LF hip Pain HISTORY OF PRESENT ILLNESS:    
 
80years old female from home with past medical history significant for hypertension, osteoporosis presented to the hospital complaining from worsening of left hip pain started about couple weeks ago patient stated she is fall from stand while she changing a light bulb in 2018 and she was seen by the primary care physician and she had x-ray done on  x-ray was negative, patient stated her left hip pain is worsening , patient denies any new fall, x-ray was done today in the hospital on show displaced left superior and inferior pubic rami fracture and sacral Poly fracture. We were asked to admit for work up and evaluation of the above problems. Past Medical History:  
Diagnosis Date  Baker's cyst of knee  Bone spur R shoulder  Hypertension  Knee pain, right  Osteoporosis  Ovarian cyst   
 Vegetarian diet Past Surgical History:  
Procedure Laterality Date  HX ANA AND BSO Social History Tobacco Use  Smoking status: Never Smoker  Smokeless tobacco: Never Used Substance Use Topics  Alcohol use: No  
  
 
History reviewed. No pertinent family history. No Known Allergies Prior to Admission medications Medication Sig Start Date End Date Taking? Authorizing Provider  
predniSONE (DELTASONE) 20 mg tablet Take 20 mg by mouth daily (with breakfast) for 5 days. 1/31/19 2/5/19 Yes Lico Martínez DO  
meloxicam (MOBIC) 7.5 mg tablet Take 1 Tab by mouth daily. 12/27/18  Yes Lico Martínez DO  
amLODIPine (NORVASC) 5 mg tablet TAKE 1 TABLET BY MOUTH DAILY. 4/5/18  Yes Lico Martínez DO  
multivitamin (ONE A DAY) tablet Take 1 Tab by mouth daily. Yes Provider, Historical  
cholecalciferol (VITAMIN D3) 1,000 unit tablet Take  by mouth daily. Yes Provider, Historical  
calcium 500 mg tab Take  by mouth. Yes Provider, Historical  
 
 
REVIEW OF SYSTEMS:    
I am not able to complete the review of systems because: The patient is intubated and sedated The patient has altered mental status due to his acute medical problems The patient has baseline aphasia from prior stroke(s) The patient has baseline dementia and is not reliable historian The patient is in acute medical distress and unable to provide information Total of 12 systems reviewed as follows:   
   POSITIVE= underlined text  Negative = text not underlined General:  fever, chills, sweats, generalized weakness, weight loss/gain,  
   loss of appetite Eyes:    blurred vision, eye pain, loss of vision, double vision ENT:    rhinorrhea, pharyngitis Respiratory:   cough, sputum production, SOB, BIRCH, wheezing, pleuritic pain Cardiology:   chest pain, palpitations, orthopnea, PND, edema, syncope Gastrointestinal:  abdominal pain , N/V, diarrhea, dysphagia, constipation, bleeding Genitourinary:  frequency, urgency, dysuria, hematuria, incontinence Muskuloskeletal :  arthralgia, myalgia, back pain Hematology:  easy bruising, nose or gum bleeding, lymphadenopathy Dermatological: rash, ulceration, pruritis, color change / jaundice Endocrine:   hot flashes or polydipsia Neurological:  headache, dizziness, confusion, focal weakness, paresthesia, Speech difficulties, memory loss, gait difficulty Psychological: Feelings of anxiety, depression, agitation Objective: VITALS:   
Visit Vitals /53 Pulse 79 Temp 98.4 °F (36.9 °C) Resp 16 Ht 5' 2\" (1.575 m) Wt 56.2 kg (124 lb) SpO2 96% BMI 22.68 kg/m² PHYSICAL EXAM: 
 
General:    Alert, cooperative, no distress, appears stated age. HEENT: Atraumatic, anicteric sclerae, pink conjunctivae No oral ulcers, mucosa moist, throat clear, dentition fair Neck:  Supple, symmetrical,  thyroid: non tender Lungs:   Clear to auscultation bilaterally. No Wheezing or Rhonchi. No rales. Chest wall:  No tenderness  No Accessory muscle use. Heart:   Regular  rhythm,  No  murmur   No edema Abdomen:   Soft, non-tender. Not distended. Bowel sounds normal 
Extremities: LF hip pain with movement Skin turgor normal, Capillary refill normal, Radial dial pulse 2+ Skin:     Not pale. Not Jaundiced  No rashes Psych:  Good insight. Not depressed. Not anxious or agitated. Neurologic: EOMs intact. No facial asymmetry. No aphasia or slurred speech. Symmetrical strength, Sensation grossly intact. Alert and oriented X 4.  
 
_______________________________________________________________________ Care Plan discussed with: 
  Comments Patient y Family RN y   
Care Manager Consultant: _______________________________________________________________________ Expected  Disposition:  
Home with Family y HH/PT/OT/RN   
SNF/LTC   
EUSEBIA   
________________________________________________________________________ TOTAL TIME:  65  Minutes Critical Care Provided     Minutes non procedure based Comments  
 y Reviewed previous records  
>50% of visit spent in counseling and coordination of care y Discussion with patient and/or family and questions answered 
  
 
________________________________________________________________________ Signed: Anitra Cantu MD 
 
Procedures: see electronic medical records for all procedures/Xrays and details which were not copied into this note but were reviewed prior to creation of Plan. LAB DATA REVIEWED:   
Recent Results (from the past 24 hour(s)) CBC WITH AUTOMATED DIFF Collection Time: 02/05/19  2:30 AM  
Result Value Ref Range WBC 8.1 3.6 - 11.0 K/uL  
 RBC 3.90 3.80 - 5.20 M/uL  
 HGB 12.0 11.5 - 16.0 g/dL HCT 35.6 35.0 - 47.0 % MCV 91.3 80.0 - 99.0 FL  
 MCH 30.8 26.0 - 34.0 PG  
 MCHC 33.7 30.0 - 36.5 g/dL  
 RDW 13.2 11.5 - 14.5 % PLATELET 225 545 - 534 K/uL MPV 10.8 8.9 - 12.9 FL  
 NRBC 0.0 0  WBC ABSOLUTE NRBC 0.00 0.00 - 0.01 K/uL NEUTROPHILS 69 32 - 75 % LYMPHOCYTES 20 12 - 49 % MONOCYTES 10 5 - 13 % EOSINOPHILS 0 0 - 7 % BASOPHILS 0 0 - 1 % IMMATURE GRANULOCYTES 1 (H) 0.0 - 0.5 % ABS. NEUTROPHILS 5.6 1.8 - 8.0 K/UL  
 ABS. LYMPHOCYTES 1.6 0.8 - 3.5 K/UL  
 ABS. MONOCYTES 0.8 0.0 - 1.0 K/UL  
 ABS. EOSINOPHILS 0.0 0.0 - 0.4 K/UL  
 ABS. BASOPHILS 0.0 0.0 - 0.1 K/UL  
 ABS. IMM. GRANS. 0.0 0.00 - 0.04 K/UL  
 DF AUTOMATED METABOLIC PANEL, COMPREHENSIVE Collection Time: 02/05/19  2:30 AM  
Result Value Ref Range Sodium 141 136 - 145 mmol/L Potassium 3.6 3.5 - 5.1 mmol/L Chloride 108 97 - 108 mmol/L  
 CO2 25 21 - 32 mmol/L  Anion gap 8 5 - 15 mmol/L  
 Glucose 123 (H) 65 - 100 mg/dL BUN 29 (H) 6 - 20 MG/DL Creatinine 0.76 0.55 - 1.02 MG/DL  
 BUN/Creatinine ratio 38 (H) 12 - 20 GFR est AA >60 >60 ml/min/1.73m2 GFR est non-AA >60 >60 ml/min/1.73m2 Calcium 8.7 8.5 - 10.1 MG/DL Bilirubin, total 0.5 0.2 - 1.0 MG/DL  
 ALT (SGPT) 20 12 - 78 U/L  
 AST (SGOT) 19 15 - 37 U/L Alk. phosphatase 158 (H) 45 - 117 U/L Protein, total 7.3 6.4 - 8.2 g/dL Albumin 3.0 (L) 3.5 - 5.0 g/dL Globulin 4.3 (H) 2.0 - 4.0 g/dL A-G Ratio 0.7 (L) 1.1 - 2.2 URINALYSIS W/ REFLEX CULTURE Collection Time: 02/05/19  4:15 AM  
Result Value Ref Range Color YELLOW/STRAW Appearance CLOUDY (A) CLEAR Specific gravity 1.026 1.003 - 1.030    
 pH (UA) 5.5 5.0 - 8.0 Protein TRACE (A) NEG mg/dL Glucose NEGATIVE  NEG mg/dL Ketone NEGATIVE  NEG mg/dL Bilirubin NEGATIVE  NEG Blood NEGATIVE  NEG Urobilinogen 0.2 0.2 - 1.0 EU/dL Nitrites POSITIVE (A) NEG Leukocyte Esterase MODERATE (A) NEG    
 WBC 20-50 0 - 4 /hpf  
 RBC 0-5 0 - 5 /hpf Epithelial cells FEW FEW /lpf Bacteria 4+ (A) NEG /hpf  
 UA:UC IF INDICATED URINE CULTURE ORDERED (A) CNI

## 2019-02-05 NOTE — PROGRESS NOTES
Problem: Mobility Impaired (Adult and Pediatric) Goal: *Acute Goals and Plan of Care (Insert Text) Physical Therapy Goals Initiated 2/5/2019 1. Patient will move from supine to sit and sit to supine , scoot up and down and roll side to side in bed with independence within 7 day(s). 2.  Patient will transfer from bed to chair and chair to bed with independence using the least restrictive device within 7 day(s). 3.  Patient will perform sit to stand with independence within 7 day(s). 4.  Patient will ambulate with modified independence for 300 feet with the least restrictive device within 7 day(s). physical Therapy EVALUATION Patient: Dolly Hawthorne (89 y.o. female) Date: 2/5/2019 Primary Diagnosis: Pubic ramus fracture ([address] Alicia Donal Pubic ramus fracture ([address] Alicia Mansfield Precautions:     
 
ASSESSMENT : 
Order received, chart reviewed and pt cleared by RN for PT evaluation. Pt received supine in bed reporting she doesn't know where her credit card is. Pt reports minimal pain with LLE weightbearing. Pt demonstrates bed mobility with Irving, sit<>stand transfers with CGA>SBA(verbal cues for safety with rollator re. Brakes), and gait with rollator x 120ft and cga(pt LLE toe walking due to pain). Pt with a little decreased safety awareness and judgement, but overall is moving pretty well. HHPT recommended at discharge to further address safety, mobility, and balance, while assisting pt with regaining optimal function to remain safe in her environment. Patient will benefit from skilled intervention to address the above impairments. Patients rehabilitation potential is considered to be Good Factors which may influence rehabilitation potential include:  
[x]         None noted 
[]         Mental ability/status []         Medical condition 
[]         Home/family situation and support systems 
[]         Safety awareness 
[]         Pain tolerance/management 
[]         Other: PLAN : 
Recommendations and Planned Interventions: 
[x]           Bed Mobility Training             []    Neuromuscular Re-Education 
[x]           Transfer Training                   []    Orthotic/Prosthetic Training 
[x]           Gait Training                         []    Modalities [x]           Therapeutic Exercises           []    Edema Management/Control 
[x]           Therapeutic Activities            [x]    Patient and Family Training/Education 
[]           Other (comment): Frequency/Duration: Patient will be followed by physical therapy  4 times a week to address goals. Discharge Recommendations: Home Health Further Equipment Recommendations for Discharge: none SUBJECTIVE:  
Patient stated I dont know where my credit card is.  OBJECTIVE DATA SUMMARY:  
HISTORY:   
Past Medical History:  
Diagnosis Date  Baker's cyst of knee  Bone spur R shoulder  Hypertension  Knee pain, right  Osteoporosis  Ovarian cyst   
 Vegetarian diet Past Surgical History:  
Procedure Laterality Date  HX ANA AND BSO Prior Level of Function/Home Situation: Pt reports I ambulation with cane and I ADLS at baseline. Pt reports living in a facility, however unsure if it is an Independent or assisted living facility. Pt reports living in an apartment on the 6th floor. Personal factors and/or comorbidities impacting plan of care: none Home Situation Home Environment: Assisted living One/Two Story Residence: One story Living Alone: Yes Support Systems: Assisted living, Friends \ neighbors Current DME Used/Available at Home: Koleen Sushma, rollator Tub or Shower Type: (tub with cut out )EXAMINATION/PRESENTATION/DECISION MAKING: Critical Behavior: 
Neurologic State: Alert Orientation Level: Appropriate for age Cognition: Memory loss, Follows commands Safety/Judgement: Fall prevention, Decreased awareness of need for safety Hearing: Auditory Auditory Impairment: NoneRange Of Motion: 
AROM: Generally decreased, functional 
  
  
Strength:   
Strength: Generally decreased, functional 
  
Tone & Sensation:  
Tone: Normal 
  
Sensation: Intact Coordination: 
Coordination: Within functional limits Functional Mobility: 
Bed Mobility: 
Rolling: Independent Supine to Sit: Independent Sit to Supine: Independent Scooting: Independent Transfers: 
Sit to Stand: Contact guard assistance Stand to Sit: Stand-by assistance Stand Pivot Transfers: Stand-by assistance Bed to Chair: Stand-by assistance Balance:  
Sitting: Intact Standing: Impaired; Without support Standing - Static: Good;Constant support Standing - Dynamic : FairAmbulation/Gait Training:Distance (ft): 120 Feet (ft) Assistive Device: Walker, rollator(verbal cues for safety regarding brakes) Ambulation - Level of Assistance: Contact guard assistance Gait Abnormalities: Toe walking(LLE) Base of Support: Widened Stance: Left decreased Speed/Ximena: Pace decreased (<100 feet/min) Step Length: Right shortened;Left shortened(R>L) Swing Pattern: Left asymmetrical 
  
  
 
Therapeutic Exercises:  
Standing :  LLE hip abd, hip ext, marches x 10 reps Functional Measure: 
Barthel Index: 
 
Bathin Bladder: 5 Bowels: 10 
Groomin Dressing: 10 Feeding: 10 Mobility: 10 Stairs: 0 Toilet Use: 5 Transfer (Bed to Chair and Back): 10 Total: 70 The Barthel ADL Index: Guidelines 1. The index should be used as a record of what a patient does, not as a record of what a patient could do. 2. The main aim is to establish degree of independence from any help, physical or verbal, however minor and for whatever reason. 3. The need for supervision renders the patient not independent. 4. A patient's performance should be established using the best available evidence.  Asking the patient, friends/relatives and nurses are the usual sources, but direct observation and common sense are also important. However direct testing is not needed. 5. Usually the patient's performance over the preceding 24-48 hours is important, but occasionally longer periods will be relevant. 6. Middle categories imply that the patient supplies over 50 per cent of the effort. 7. Use of aids to be independent is allowed. Rubie Osler., Barthel, D.W. (0589). Functional evaluation: the Barthel Index. [facility] (14)2. Ignacio Parra gabe FABIO Dai, Mary Anne Limon., Vicki Porter., Jeff Shahrzad, [address] (1999). Measuring the change indisability after inpatient rehabilitation; comparison of the responsiveness of the Barthel Index and Functional [place] Measure. Journal of Neurology, Neurosurgery, and Psychiatry, 66(4), 258-928. LUCIUS Santillan, BRADY Rubin, & Francy Trevizo MSaleemA. (2004.) Assessment of post-stroke quality of life in cost-effectiveness studies: The usefulness of the Barthel Index and the EuroQoL-5D. [facility], 13, 292-17 Physical Therapy Evaluation Charge Determination History Examination Presentation Decision-Making LOW Complexity : Zero comorbidities / personal factors that will impact the outcome / POC LOW Complexity : 1-2 Standardized tests and measures addressing body structure, function, activity limitation and / or participation in recreation  LOW Complexity : Stable, uncomplicated  LOW Complexity : FOTO score of  Based on the above components, the patient evaluation is determined to be of the following complexity level: LOW Pain:L groin:  4/10 Activity Tolerance:  
Good with pain control Please refer to the flowsheet for vital signs taken during this treatment. After treatment:  
[x]         Patient left in no apparent distress sitting up in chair 
[]         Patient left in no apparent distress in bed 
[x]         Call bell left within reach [x]         Nursing notified []         Caregiver present 
[]         Bed alarm activated COMMUNICATION/EDUCATION:  
The patients plan of care was discussed with: Registered Nurse 
[x]         Fall prevention education was provided and the patient/caregiver indicated understanding. [x]         Patient/family have participated as able in goal setting and plan of care. [x]         Patient/family agree to work toward stated goals and plan of care. []         Patient understands intent and goals of therapy, but is neutral about his/her participation. []         Patient is unable to participate in goal setting and plan of care. Thank you for this referral. 
William Altman, PT Time Calculation: 23 mins

## 2019-02-05 NOTE — CDMP QUERY
Account Number: [de-identified] MRN: [id number], Patient: Nabor Vergara Created: [id number] Clinician Name: Eda Dumont RN, CCDS Dr. Tash Blackwood MD : 
Patient admitted with GLF hip pain noted to have UA with pyuria and bacteria. If possible, please document in progress notes and d/c summary if you are evaluating and/or treating any of the following:  
 
=> Urinary tract infection POA w/ +UA   
=> Other explanation of clinical findings  
=> Clinically Undetermined (no explanation for clinical findings) The medical record reflects the following: 
   Risk Factors: 89yoF w/ + UA Clinical Indicators:  UA 4 +bact wbc 20-50  nitrites + LE mod Treatment: IV rocephin & ur cx Thank Frantz Perez RN, CCDS

## 2019-02-06 ENCOUNTER — HOME HEALTH ADMISSION (OUTPATIENT)
Dept: HOME HEALTH SERVICES | Facility: HOME HEALTH | Age: 84
End: 2019-02-06

## 2019-02-06 LAB
ANION GAP SERPL CALC-SCNC: 8 MMOL/L (ref 5–15)
BASOPHILS # BLD: 0 K/UL (ref 0–0.1)
BASOPHILS NFR BLD: 0 % (ref 0–1)
BUN SERPL-MCNC: 19 MG/DL (ref 6–20)
BUN/CREAT SERPL: 28 (ref 12–20)
CALCIUM SERPL-MCNC: 8.5 MG/DL (ref 8.5–10.1)
CHLORIDE SERPL-SCNC: 107 MMOL/L (ref 97–108)
CO2 SERPL-SCNC: 26 MMOL/L (ref 21–32)
CREAT SERPL-MCNC: 0.69 MG/DL (ref 0.55–1.02)
DIFFERENTIAL METHOD BLD: ABNORMAL
EOSINOPHIL # BLD: 0.1 K/UL (ref 0–0.4)
EOSINOPHIL NFR BLD: 1 % (ref 0–7)
ERYTHROCYTE [DISTWIDTH] IN BLOOD BY AUTOMATED COUNT: 13.2 % (ref 11.5–14.5)
GLUCOSE SERPL-MCNC: 104 MG/DL (ref 65–100)
HCT VFR BLD AUTO: 36.4 % (ref 35–47)
HGB BLD-MCNC: 12.1 G/DL (ref 11.5–16)
IMM GRANULOCYTES # BLD AUTO: 0 K/UL (ref 0–0.04)
IMM GRANULOCYTES NFR BLD AUTO: 1 % (ref 0–0.5)
LYMPHOCYTES # BLD: 1.6 K/UL (ref 0.8–3.5)
LYMPHOCYTES NFR BLD: 25 % (ref 12–49)
MCH RBC QN AUTO: 31.1 PG (ref 26–34)
MCHC RBC AUTO-ENTMCNC: 33.2 G/DL (ref 30–36.5)
MCV RBC AUTO: 93.6 FL (ref 80–99)
MONOCYTES # BLD: 0.6 K/UL (ref 0–1)
MONOCYTES NFR BLD: 9 % (ref 5–13)
NEUTS SEG # BLD: 4.2 K/UL (ref 1.8–8)
NEUTS SEG NFR BLD: 65 % (ref 32–75)
NRBC # BLD: 0 K/UL (ref 0–0.01)
NRBC BLD-RTO: 0 PER 100 WBC
PLATELET # BLD AUTO: 260 K/UL (ref 150–400)
PMV BLD AUTO: 11.1 FL (ref 8.9–12.9)
POTASSIUM SERPL-SCNC: 3.7 MMOL/L (ref 3.5–5.1)
RBC # BLD AUTO: 3.89 M/UL (ref 3.8–5.2)
SODIUM SERPL-SCNC: 141 MMOL/L (ref 136–145)
WBC # BLD AUTO: 6.5 K/UL (ref 3.6–11)

## 2019-02-06 PROCEDURE — 65270000029 HC RM PRIVATE

## 2019-02-06 PROCEDURE — 74011250636 HC RX REV CODE- 250/636: Performed by: INTERNAL MEDICINE

## 2019-02-06 PROCEDURE — 74011250637 HC RX REV CODE- 250/637: Performed by: INTERNAL MEDICINE

## 2019-02-06 PROCEDURE — 85025 COMPLETE CBC W/AUTO DIFF WBC: CPT

## 2019-02-06 PROCEDURE — 80048 BASIC METABOLIC PNL TOTAL CA: CPT

## 2019-02-06 PROCEDURE — 36415 COLL VENOUS BLD VENIPUNCTURE: CPT

## 2019-02-06 PROCEDURE — 97116 GAIT TRAINING THERAPY: CPT

## 2019-02-06 PROCEDURE — 74011000258 HC RX REV CODE- 258: Performed by: INTERNAL MEDICINE

## 2019-02-06 PROCEDURE — 51798 US URINE CAPACITY MEASURE: CPT

## 2019-02-06 RX ADMIN — CEFTRIAXONE SODIUM 1 G: 1 INJECTION, POWDER, FOR SOLUTION INTRAMUSCULAR; INTRAVENOUS at 14:51

## 2019-02-06 RX ADMIN — THERA TABS 1 TABLET: TAB at 08:41

## 2019-02-06 RX ADMIN — AMLODIPINE BESYLATE 5 MG: 5 TABLET ORAL at 08:41

## 2019-02-06 RX ADMIN — Medication 10 ML: at 14:52

## 2019-02-06 RX ADMIN — VITAMIN D, TAB 1000IU (100/BT) 1000 UNITS: 25 TAB at 08:41

## 2019-02-06 RX ADMIN — ENOXAPARIN SODIUM 30 MG: 30 INJECTION SUBCUTANEOUS at 08:40

## 2019-02-06 RX ADMIN — Medication 10 ML: at 21:13

## 2019-02-06 RX ADMIN — ACETAMINOPHEN 650 MG: 325 TABLET ORAL at 04:46

## 2019-02-06 RX ADMIN — Medication 10 ML: at 04:58

## 2019-02-06 RX ADMIN — CALCIUM 500 MG: 500 TABLET ORAL at 08:41

## 2019-02-06 NOTE — PROGRESS NOTES
Orthopedic End of Shift Note Bedside shift change report given to Iraida Madrid (oncoming nurse) by DEAN Ron (offgoing nurse). Report included the following information SBAR, Kardex, Procedure Summary, Intake/Output, MAR and Recent Results. POD#  N/A Significant issues during shift:  Patient placed on the Yanna camera monitoring Issues for Physician to address none at this time Nausea/Vomiting [] yes [x] no Palomo Catheter [] in [] removed voiding Bowel Movements [] yes [x] no Foot Pumps or SCD [x] yes [] no Ice Pack [] yes    [x] no Incentive Spirometer [] yes [] no Volume:     
Telementary Monitoring   [] yes [x] no Rhythm:  
Supplemental O2 [] yes [] no Sat off O2:   100% Patient Vitals for the past 12 hrs: 
 Temp Pulse Resp BP SpO2  
02/06/19 0444 98.2 °F (36.8 °C) 72 16 155/57 93 % 02/06/19 0010 98 °F (36.7 °C) 68 18 156/73 96 % Lab Results Component Value Date/Time HGB 12.1 02/06/2019 04:53 AM  
 HCT 36.4 02/06/2019 04:53 AM  
 
No results found for: INR Intake/Output Summary (Last 24 hours) at 2/6/2019 8084 Last data filed at 2/6/2019 1477 Gross per 24 hour Intake  Output 750 ml Net -750 ml Peripheral Intravenous Line: 
Peripheral IV 02/05/19 Right Antecubital (Active) Site Assessment Clean, dry, & intact 2/6/2019  3:14 AM  
Phlebitis Assessment 0 2/6/2019  3:14 AM  
Infiltration Assessment 0 2/6/2019  3:14 AM  
Dressing Status Clean, dry, & intact 2/6/2019  3:14 AM  
Dressing Type Tape;Transparent 2/6/2019  3:14 AM  
Hub Color/Line Status Pink;Capped 2/6/2019  3:14 AM  
 
Drain(s): none

## 2019-02-06 NOTE — PROGRESS NOTES
Problem: Mobility Impaired (Adult and Pediatric) Goal: *Acute Goals and Plan of Care (Insert Text) Physical Therapy Goals Initiated 2/5/2019 1. Patient will move from supine to sit and sit to supine , scoot up and down and roll side to side in bed with independence within 7 day(s). 2.  Patient will transfer from bed to chair and chair to bed with independence using the least restrictive device within 7 day(s). 3.  Patient will perform sit to stand with independence within 7 day(s). 4.  Patient will ambulate with modified independence for 300 feet with the least restrictive device within 7 day(s). physical Therapy TREATMENT Patient: Nash Ireland (43 y.o. female) Date: 2/6/2019 Diagnosis: Pubic ramus fracture ([facility] [address] Elliott Rank Pubic ramus fracture ([facility] [address] [S32.599A] <principal problem not specified> Precautions:   
Chart, physical therapy assessment, plan of care and goals were reviewed. ASSESSMENT: 
Chart reviewed and pt cleared by RN for PT tx session. Pt received seated in bedside chair, agreeable to PT tx session. Pt worried about being in the hospital and her being able to stay in her apartment. Pt reassured several times that her apartment is fine and will be the way she left it, when she returns. Pt demonstrating all functional mobility with SBA this visit. Pt continues to require vcs for safety with managing rollator(brakes) for safety. Pt continues to ambulate with a R toe walking pattern due to pain, reporting increased pain with heel toe gait. Pt with gait distance tolerance x 200ft, limited by L pelvic pain. Pt moving well overall and should be fine returning to her apartment with HHPT to further address activity tolerance, safety, and balance. Pt's confusion should clear up once she is back in her familiar environment. Progression toward goals: 
[x]    Improving appropriately and progressing toward goals 
[]    Improving slowly and progressing toward goals []    Not making progress toward goals and plan of care will be adjusted PLAN: 
Patient continues to benefit from skilled intervention to address the above impairments. Continue treatment per established plan of care. Discharge Recommendations:  Home Health Further Equipment Recommendations for Discharge:  none SUBJECTIVE:  
Patient stated When do I go home? Tiny Hand OBJECTIVE DATA SUMMARY:  
Critical Behavior: 
Neurologic State: Alert Orientation Level: Oriented to person, Oriented to place, Oriented to situation Cognition: Decreased attention/concentration, Follows commands Safety/Judgement: Fall prevention, Decreased awareness of need for safety Functional Mobility Training: 
  
  
Transfers: 
Sit to Stand: Stand-by assistance(vc to have rollator brakes locked) Stand to Sit: Stand-by assistance Ambulation/Gait Training: 
Distance (ft): 200 Feet (ft) Assistive Device: Walker, rollator Ambulation - Level of Assistance: Stand-by assistance Gait Abnormalities: Toe walking(LLE) Stance: Left decreased Speed/Ximena: Pace decreased (<100 feet/min) Step Length: Right shortened(R>L) Swing Pattern: Left symmetrical 
  
 
Pain: 
Pain Scale 1: Numeric (0 - 10) Pain Intensity 1: 0 Pain Location 1: Hip;Leg;Pelvis Pain Orientation 1: Left Pain Description 1: Sore Pain Intervention(s) 1: Medication (see MAR) Activity Tolerance:  
Good,but some limitation because of pain Please refer to the flowsheet for vital signs taken during this treatment. After treatment:  
[x]    Patient left in no apparent distress sitting up in chair 
[]    Patient left in no apparent distress in bed 
[x]    Call bell left within reach [x]    Nursing notified 
[]    Caregiver present [x]    Bed alarm activated COMMUNICATION/COLLABORATION:  
The patients plan of care was discussed with: Registered Nurse Mainor Man, PT Time Calculation: 17 mins

## 2019-02-06 NOTE — PROGRESS NOTES
Reason for Admission:   Pt is an 81 y/o  female who was admitted with pubic ramus fracture RRAT Score:          7 Plan for utilizing home health:    Pt is agreeable to home health, referral sent to [facility] Likelihood of Readmission:  Low Transition of Care Plan:           CM met with pt re: assessment, discharge planning. CM introduced self, role. Pt verbalized understanding. Pt verified demographic information on chart. Pt lives in independent living at [facility]. Pt is independent in ADL/IADL needs at baseline. Pt has access to a rollator in the home. Pt stated she is open to utilizing home health upon discharge. Pt does not have family in the area, CM to arrange transportation upon discharge. FOC was offered re: home health and pt chose [facility] for home health. Signed FOC on bedside chart. CM sent referral to [facility] via [address]. Pt stated she has no further questions or needs at this time. CM will continue to remain available for support, discharge planning as needed. 10:47 a.m. UPDATE Pt has been accepted by [facility] for home health. AVS updated with [facility] information. CM will continue to remain available for support, discharge planning as needed. 11:59 a.m. 
CM discussed plan of care with Dr. Mohinder Watson. Per Dr. Mohinder Watson, pt may benefit from SNF for safety upon discharge. Pt will need 3 midnights for SNF benefits. CM informed pt of plan and pt is in agreement. CM sent referral to [facility] for review. CM will continue to remain available for support, discharge planning as needed. Care Management Interventions PCP Verified by CM: Yes Mode of Transport at Discharge: [address] Transition of Care Consult (CM Consult): Home Health [address]: Yes Discharge Durable Medical Equipment: No 
Physical Therapy Consult: Yes Occupational Therapy Consult: No 
Speech Therapy Consult: No 
 Current Support Network: Other(Independent living at [facility]) Confirm Follow Up Transport: Wheelchair Lula Libman Plan discussed with Pt/Family/Caregiver: Yes Freedom of Choice Offered: Yes Discharge Location Discharge Placement: Home with home health HALINA Munguia, LSW Supervisee in Social Work [facility] [phone]

## 2019-02-06 NOTE — H&P
History and Physical 
 
Patient: Cristel Abel MRN: [id number]  SSN: [id number] YOB: 1929  Age: 80 y.o. Sex: female Subjective:  
  
Cristel Abel is a 80 y.o. female who presents with left hip pain. History of multiple falls. Pain has been present for >1 month. Denies any weakness. No b/b incontinence. No back pain. Ambulates at baseline with a walker. Past Medical History:  
Diagnosis Date  Baker's cyst of knee  Bone spur R shoulder  Hypertension  Knee pain, right  Osteoporosis  Ovarian cyst   
 Vegetarian diet Past Surgical History:  
Procedure Laterality Date  HX ANA AND BSO History reviewed. No pertinent family history. Social History Tobacco Use  Smoking status: Never Smoker  Smokeless tobacco: Never Used Substance Use Topics  Alcohol use: No  
  
Prior to Admission medications Medication Sig Start Date End Date Taking? Authorizing Provider  
predniSONE (DELTASONE) 20 mg tablet Take 20 mg by mouth daily (with breakfast) for 5 days. 1/31/19 2/5/19 Yes Lico Martínez, DO  
meloxicam (MOBIC) 7.5 mg tablet Take 1 Tab by mouth daily. 12/27/18  Yes Lico Martínez, DO  
amLODIPine (NORVASC) 5 mg tablet TAKE 1 TABLET BY MOUTH DAILY. 4/5/18  Yes Lico Martínez, DO  
multivitamin (ONE A DAY) tablet Take 1 Tab by mouth daily. Yes Provider, Historical  
cholecalciferol (VITAMIN D3) 1,000 unit tablet Take  by mouth daily. Yes Provider, Historical  
calcium 500 mg tab Take  by mouth. Yes Provider, Historical  
  
 
No Known Allergies Review of Systems: 
O/w negative Objective:  
 
Vitals:  
 02/05/19 1948 02/06/19 0010 02/06/19 2969 02/06/19 3194 BP: 141/58 156/73 155/57 165/68 Pulse: 72 68 72 80 Resp: 16 18 16 16 Temp: 98.2 °F (36.8 °C) 98 °F (36.7 °C) 98.2 °F (36.8 °C) 99 °F (37.2 °C) SpO2: 95% 96% 93% 98% Weight:      
Height:      
  
 
Physical Exam: BLE: 
Up and walking when I see her Distally NVI 
 Can SLR No back ttp or pain with lumbar ROM 
 
CT scan - read and interpreted by myself: left superior and inferior pubic ramus fractures, H shaped sacral fractures without any SI diastasis, L4 superior endplate fracture Assessment:  
 
Hospital Problems  Date Reviewed: 1/31/2019 Codes Class Noted POA Pubic ramus fracture [facility]) ICD-10-CM: T11.914P 
ICD-9-CM: 808.2  2/5/2019 Unknown Sacral fractures L4 fracture Plan: L4 fracture asymptomatic so can ignore. Pelvic fractures are stable so she can WBAT with walker. PRN PO pain control. DVT ppx per medicine. Can f/u with me in 3 weeks in clinic. Signed By: Kenya Ojeda MD   
 February 6, 2019

## 2019-02-06 NOTE — PROGRESS NOTES
Hospitalist Progress Note NAME: Frederick Quick :  8/15/1929 MRN:  [id number] Assessment / Plan: UTI POA 
IV rocephin F/u cultures Acute encephalopathy with ? baseline mental status Suspect either due to UTI or baseline some degree of dementia I am concern about getting back to unsupervised environment (private place at assisted living facility). I have spoke to CM about SNF vs supervised place at her faciltiy Acute displaced left superior and inferior pubic rami fracture and sacral poly fracture Supportive care Appreciate Orthopedic input, will will continue to follow her as an OP 
PT/OT Pain Management Hypertension Continue Norvasc Bp stable Osteoporosis Continue calcium and vitamin D supplement 
   
Code Status: full Surrogate Decision Maker:daughter  
  
DVT Prophylaxis: Lovenox GI Prophylaxis: not indicated 
  
Baseline: independent Subjective: Pt seen and examined at bedside. Willing to go home to take care of her money. Overnight events d/w RN  
 
CHIEF COMPLAINT: /u \"LF hip Pain\"  
  
Review of Systems: 
Symptom Y/N Comments  Symptom Y/N Comments Fever/Chills    Chest Pain Poor Appetite    Edema Cough    Abdominal Pain Sputum    Joint Pain SOB/BIRCH    Pruritis/Rash Nausea/vomit    Tolerating PT/OT Diarrhea    Tolerating Diet Constipation    Other Could NOT obtain due to: Poor insight Objective: VITALS:  
Last 24hrs VS reviewed since prior progress note. Most recent are: 
Patient Vitals for the past 24 hrs: 
 Temp Pulse Resp BP SpO2  
19 1237 98.2 °F (36.8 °C) 82 16 135/53 94 % 19 0835 99 °F (37.2 °C) 80 16 165/68 98 % 19 0444 98.2 °F (36.8 °C) 72 16 155/57 93 % 19 0010 98 °F (36.7 °C) 68 18 156/73 96 % 19 1948 98.2 °F (36.8 °C) 72 16 141/58 95 % 19 1705 98 °F (36.7 °C) 80 16 148/71 91 % Intake/Output Summary (Last 24 hours) at 2019 1414 Last data filed at 2/6/2019 9128 Gross per 24 hour Intake  Output 750 ml Net -750 ml PHYSICAL EXAM: 
General: WD, WN. Alert, cooperative, no acute distress   
EENT:  EOMI. Anicteric sclerae. MMM Resp:  CTA bilaterally, no wheezing or rales. No accessory muscle use CV:  Regular  rhythm,  No edema GI:  Soft, Non distended, Non tender.  +Bowel sounds Neurologic:  Alert, normal speech, Psych:   Poor insight. Not anxious nor agitated Skin:  No rashes. No jaundice Reviewed most current lab test results and cultures  YES Reviewed most current radiology test results   YES Review and summation of old records today    NO Reviewed patient's current orders and MAR    YES 
PMH/SH reviewed - no change compared to H&P 
________________________________________________________________________ Care Plan discussed with: 
  Comments Patient y Family RN y   
Care Manager Consultant Multidiciplinary team rounds were held today with , nursing, pharmacist and clinical coordinator. Patient's plan of care was discussed; medications were reviewed and discharge planning was addressed. ________________________________________________________________________ Total NON critical care TIME:  35  Minutes Total CRITICAL CARE TIME Spent:   Minutes non procedure based Comments >50% of visit spent in counseling and coordination of care    
________________________________________________________________________ Brissa Cid MD  
 
Procedures: see electronic medical records for all procedures/Xrays and details which were not copied into this note but were reviewed prior to creation of Plan. LABS: 
I reviewed today's most current labs and imaging studies. Pertinent labs include: 
Recent Labs 02/06/19 
0453 02/05/19 
0230 WBC 6.5 8.1 HGB 12.1 12.0 HCT 36.4 35.6  247 Recent Labs 02/06/19 0453 02/05/19 
0230  141 K 3.7 3.6  108 CO2 26 25 * 123* BUN 19 29* CREA 0.69 0.76 CA 8.5 8.7 ALB  --  3.0* TBILI  --  0.5 SGOT  --  19 ALT  --  20 Signed: Louis Harper MD

## 2019-02-06 NOTE — ROUTINE PROCESS
2/6/2019 
07:30 AM 
 
Orthopedic End of Shift Note Bedside shift change report given to Kevin LEBRON (oncoming nurse) by Saida Hassan (offgoing nurse). Report included the following information SBAR, Kardex, ED Summary, Procedure Summary, Intake/Output, MAR, Accordion, Recent Results and Med Rec Status. POD# Significant issues during shift:  
 
Issues for Physician to address confusion Nausea/Vomiting [] yes [] no Palomo Catheter [] in [] removed Bowel Movements [] yes [x] no Foot Pumps or SCD [x] yes [] no Ice Pack [] yes    [x] no Incentive Spirometer [] yes [x] no Volume:    
Telementary Monitoring   [] yes [x] no Rhythm:  
Supplemental O2 [] yes [] no Sat off O2:   95% Patient Vitals for the past 12 hrs: 
 Temp Pulse Resp BP SpO2  
02/06/19 0444 98.2 °F (36.8 °C) 72 16 155/57 93 % 02/06/19 0010 98 °F (36.7 °C) 68 18 156/73 96 % 02/05/19 1948 98.2 °F (36.8 °C) 72 16 141/58 95 % Lab Results Component Value Date/Time HGB 12.1 02/06/2019 04:53 AM  
 HCT 36.4 02/06/2019 04:53 AM  
 
No results found for: INR Intake/Output Summary (Last 24 hours) at 2/6/2019 7893 Last data filed at 2/6/2019 5604 Gross per 24 hour Intake  Output 450 ml Net -450 ml Peripheral Intravenous Line: 
Peripheral IV 02/05/19 Right Antecubital (Active) Site Assessment Clean, dry, & intact 2/6/2019  3:14 AM  
Phlebitis Assessment 0 2/6/2019  3:14 AM  
Infiltration Assessment 0 2/6/2019  3:14 AM  
Dressing Status Clean, dry, & intact 2/6/2019  3:14 AM  
Dressing Type Tape;Transparent 2/6/2019  3:14 AM  
Hub Color/Line Status Pink;Capped 2/6/2019  3:14 AM  
 
Drain(s):

## 2019-02-06 NOTE — PROGRESS NOTES
2/6/2019 
4:48 AM 
 
Daughter lives in [place]. Constance Jackson? Son-in-law a [employer] . Savi Danielson?

## 2019-02-06 NOTE — PROGRESS NOTES
Yanna camera monitoring initiated with patient to prevent a fall . Communicated to patient that the camera monitoring is to assist her in remembering to ring the call bell for assist before she attempts to walk and that the camera will be turned off when she is doing personal care. The patient voiced an understanding.

## 2019-02-07 LAB
ANION GAP SERPL CALC-SCNC: 6 MMOL/L (ref 5–15)
BACTERIA SPEC CULT: ABNORMAL
BASOPHILS # BLD: 0 K/UL (ref 0–0.1)
BASOPHILS NFR BLD: 0 % (ref 0–1)
BUN SERPL-MCNC: 22 MG/DL (ref 6–20)
BUN/CREAT SERPL: 28 (ref 12–20)
CALCIUM SERPL-MCNC: 9 MG/DL (ref 8.5–10.1)
CC UR VC: ABNORMAL
CHLORIDE SERPL-SCNC: 108 MMOL/L (ref 97–108)
CO2 SERPL-SCNC: 25 MMOL/L (ref 21–32)
CREAT SERPL-MCNC: 0.79 MG/DL (ref 0.55–1.02)
DIFFERENTIAL METHOD BLD: ABNORMAL
EOSINOPHIL # BLD: 0.2 K/UL (ref 0–0.4)
EOSINOPHIL NFR BLD: 2 % (ref 0–7)
ERYTHROCYTE [DISTWIDTH] IN BLOOD BY AUTOMATED COUNT: 13.2 % (ref 11.5–14.5)
GLUCOSE SERPL-MCNC: 120 MG/DL (ref 65–100)
HCT VFR BLD AUTO: 37.9 % (ref 35–47)
HGB BLD-MCNC: 12.6 G/DL (ref 11.5–16)
IMM GRANULOCYTES # BLD AUTO: 0 K/UL (ref 0–0.04)
IMM GRANULOCYTES NFR BLD AUTO: 1 % (ref 0–0.5)
LYMPHOCYTES # BLD: 2.2 K/UL (ref 0.8–3.5)
LYMPHOCYTES NFR BLD: 27 % (ref 12–49)
MCH RBC QN AUTO: 30.9 PG (ref 26–34)
MCHC RBC AUTO-ENTMCNC: 33.2 G/DL (ref 30–36.5)
MCV RBC AUTO: 92.9 FL (ref 80–99)
MONOCYTES # BLD: 0.8 K/UL (ref 0–1)
MONOCYTES NFR BLD: 11 % (ref 5–13)
NEUTS SEG # BLD: 4.8 K/UL (ref 1.8–8)
NEUTS SEG NFR BLD: 60 % (ref 32–75)
NRBC # BLD: 0 K/UL (ref 0–0.01)
NRBC BLD-RTO: 0 PER 100 WBC
PLATELET # BLD AUTO: 261 K/UL (ref 150–400)
PMV BLD AUTO: 10.7 FL (ref 8.9–12.9)
POTASSIUM SERPL-SCNC: 4.1 MMOL/L (ref 3.5–5.1)
RBC # BLD AUTO: 4.08 M/UL (ref 3.8–5.2)
SERVICE CMNT-IMP: ABNORMAL
SODIUM SERPL-SCNC: 139 MMOL/L (ref 136–145)
WBC # BLD AUTO: 8 K/UL (ref 3.6–11)

## 2019-02-07 PROCEDURE — 80048 BASIC METABOLIC PNL TOTAL CA: CPT

## 2019-02-07 PROCEDURE — 97116 GAIT TRAINING THERAPY: CPT

## 2019-02-07 PROCEDURE — 74011000258 HC RX REV CODE- 258: Performed by: INTERNAL MEDICINE

## 2019-02-07 PROCEDURE — 97535 SELF CARE MNGMENT TRAINING: CPT | Performed by: OCCUPATIONAL THERAPIST

## 2019-02-07 PROCEDURE — 94760 N-INVAS EAR/PLS OXIMETRY 1: CPT

## 2019-02-07 PROCEDURE — 74011250637 HC RX REV CODE- 250/637: Performed by: INTERNAL MEDICINE

## 2019-02-07 PROCEDURE — 97165 OT EVAL LOW COMPLEX 30 MIN: CPT | Performed by: OCCUPATIONAL THERAPIST

## 2019-02-07 PROCEDURE — 74011250636 HC RX REV CODE- 250/636: Performed by: INTERNAL MEDICINE

## 2019-02-07 PROCEDURE — 65270000029 HC RM PRIVATE

## 2019-02-07 PROCEDURE — 85025 COMPLETE CBC W/AUTO DIFF WBC: CPT

## 2019-02-07 PROCEDURE — 36415 COLL VENOUS BLD VENIPUNCTURE: CPT

## 2019-02-07 PROCEDURE — 97530 THERAPEUTIC ACTIVITIES: CPT

## 2019-02-07 RX ADMIN — ACETAMINOPHEN 650 MG: 325 TABLET ORAL at 00:42

## 2019-02-07 RX ADMIN — ACETAMINOPHEN 650 MG: 325 TABLET ORAL at 12:13

## 2019-02-07 RX ADMIN — Medication 10 ML: at 21:22

## 2019-02-07 RX ADMIN — Medication 10 ML: at 13:48

## 2019-02-07 RX ADMIN — THERA TABS 1 TABLET: TAB at 10:26

## 2019-02-07 RX ADMIN — VITAMIN D, TAB 1000IU (100/BT) 1000 UNITS: 25 TAB at 10:27

## 2019-02-07 RX ADMIN — Medication 10 ML: at 05:57

## 2019-02-07 RX ADMIN — ENOXAPARIN SODIUM 30 MG: 30 INJECTION SUBCUTANEOUS at 10:28

## 2019-02-07 RX ADMIN — ACETAMINOPHEN 650 MG: 325 TABLET ORAL at 21:21

## 2019-02-07 RX ADMIN — AMLODIPINE BESYLATE 5 MG: 5 TABLET ORAL at 10:27

## 2019-02-07 RX ADMIN — CEFTRIAXONE SODIUM 1 G: 1 INJECTION, POWDER, FOR SOLUTION INTRAMUSCULAR; INTRAVENOUS at 13:47

## 2019-02-07 RX ADMIN — CALCIUM 500 MG: 500 TABLET ORAL at 10:27

## 2019-02-07 NOTE — PROGRESS NOTES
Problem: Mobility Impaired (Adult and Pediatric) Goal: *Acute Goals and Plan of Care (Insert Text) Physical Therapy Goals Initiated 2/5/2019 1. Patient will move from supine to sit and sit to supine , scoot up and down and roll side to side in bed with independence within 7 day(s). MET 2. Patient will transfer from bed to chair and chair to bed with independence using the least restrictive device within 7 day(s). 3.  Patient will perform sit to stand with independence within 7 day(s). 4.  Patient will ambulate with modified independence for 300 feet with the least restrictive device within 7 day(s). physical Therapy TREATMENT Patient: Marce Godwin (71 y.o. female) Date: 2/7/2019 Diagnosis: Pubic ramus fracture ([facility] [address] Martín Ashish Pubic ramus fracture ([facility] [address] [S32.599A] <principal problem not specified> Precautions:   
Chart, physical therapy assessment, plan of care and goals were reviewed. ASSESSMENT: 
Chart reviewed and pt cleared by RN for PT tx session. Pt received lying in bed, agreeable to tx session. Pt expressing fear of going home alone. Pt continues with impaired safety awareness, requiring constant cues for safety with transfers, ambulation, and overall mobility. Pt with improved gait quality, no longer L toe walking. Pt continues to report L groin pain, but use of rollator helps. Overall mobility requires SBA, for safety. SNF rehab now recommended instead of Home Health PT, given pts continues decreased safety awareness and judgement, as well as her verbal expression of feeling uncomfortable going home alone. SNF to further address pt's LLE pain, decreased ambulation, decreased balance, decreased safety awareness, and decreased overall independence to optimize pt's function in order to safety return to Independent living facility. Progression toward goals: 
[x]    Improving appropriately and progressing toward goals 
[]    Improving slowly and progressing toward goals []    Not making progress toward goals and plan of care will be adjusted PLAN: 
Patient continues to benefit from skilled intervention to address the above impairments. Continue treatment per established plan of care. Discharge Recommendations:  Matthias Buitrago Further Equipment Recommendations for Discharge:  tbd SUBJECTIVE:  
Patient stated When I go home, I will be all alone.  OBJECTIVE DATA SUMMARY:  
Critical Behavior: 
Neurologic State: Alert, Confused, Eyes open spontaneously Orientation Level: Oriented to person, Oriented to situation, Disoriented to time, Disoriented to place Cognition: Impulsive, Decreased attention/concentration, Decreased command following, Poor safety awareness Safety/Judgement: Fall prevention, Decreased awareness of need for safety Functional Mobility Training: 
Bed Mobility: 
Rolling: Independent Supine to Sit: Independent Sit to Supine: Independent Scooting: Independent Transfers: 
Sit to Stand: Stand-by assistance Stand to Sit: Stand-by assistance Bed to Chair: Stand-by assistance Balance: 
Sitting: Intact Standing: Impaired; Without support Standing - Static: Good;Constant support Standing - Dynamic : FairAmbulation/Gait Training: 
Distance (ft): 200 Feet (ft) Assistive Device: Walker, rollator;Gait belt Ambulation - Level of Assistance: Stand-by assistance Gait Abnormalities: Decreased step clearance Stance: Left decreased Speed/Ximena: Pace decreased (<100 feet/min) Step Length: Right shortened Pain: 
Pain Scale 1: Numeric (0 - 10) Pain Intensity 1: 0 Pain Location 1: Leg 
Pain Orientation 1: Left Activity Tolerance:  
Good Please refer to the flowsheet for vital signs taken during this treatment. After treatment:  
[x]    Patient left in no apparent distress sitting up in chair 
[]    Patient left in no apparent distress in bed 
[x]    Call bell left within reach [x]    Nursing notified 
[]    Caregiver present [x]    Bed alarm activated COMMUNICATION/COLLABORATION:  
The patients plan of care was discussed with: Registered Nurse,  Yin Langley, PT Time Calculation: 20 mins

## 2019-02-07 NOTE — ROUTINE PROCESS
2/7/2019 
07:30 AM 
 
Orthopedic End of Shift Note Bedside and Verbal shift change report given to [facility], RN (oncoming nurse) by Fiorella Lira (offgoing nurse). Report included the following information SBAR, Kardex, ED Summary, Procedure Summary, Intake/Output, MAR, Accordion, Recent Results and Med Rec Status. POD# Significant issues during shift:  
 
Issues for Physician to address Nausea/Vomiting [] yes [x] no Palomo Catheter [] in [] removed Bowel Movements [] yes [x] no Foot Pumps or SCD [x] yes [] no Refusing Ice Pack [] yes    [x] no Incentive Spirometer [] yes [x] no Volume:     
Telementary Monitoring   [] yes [x] no Rhythm:  
Supplemental O2 [] yes [x] no Sat off O2:   97% Patient Vitals for the past 12 hrs: 
 Temp Pulse Resp BP SpO2  
02/07/19 0505 98 °F (36.7 °C) 74 16 141/55 95 % 02/07/19 0032 97.5 °F (36.4 °C) 78 18 145/67 97 % Lab Results Component Value Date/Time HGB 12.6 02/07/2019 03:06 AM  
 HCT 37.9 02/07/2019 03:06 AM  
 
No results found for: INR Intake/Output Summary (Last 24 hours) at 2/7/2019 2839 Last data filed at 2/6/2019 2137 Gross per 24 hour Intake 480 ml Output  Net 480 ml Peripheral Intravenous Line: 
Peripheral IV 02/05/19 Right Antecubital (Active) Site Assessment Clean, dry, & intact 2/6/2019 11:45 PM  
Phlebitis Assessment 0 2/6/2019 11:45 PM  
Infiltration Assessment 0 2/6/2019 11:45 PM  
Dressing Status Clean, dry, & intact 2/6/2019 11:45 PM  
Dressing Type Tape;Transparent 2/6/2019 11:45 PM  
Hub Color/Line Status Pink;Capped 2/6/2019 11:45 PM  
 
Drain(s):

## 2019-02-07 NOTE — PROGRESS NOTES
Hospitalist Progress Note NAME: Nash Ireland :  8/15/1929 MRN:  [id number] Assessment / Plan: UTI POA 
IV Rocephin UC with e.coli Acute encephalopathy with ? baseline mental status Suspect either due to UTI or baseline some degree of dementia I am concern about getting back to unsupervised environment (private place at assisted living facility). I have spoken to daughter, will ask CM for assistance Acute displaced left superior and inferior pubic rami fracture and sacral poly fracture Supportive care Appreciate Orthopedic input, will will continue to follow her as an OP 
PT/OT Pain Management Hypertension Continue Norvasc Bp stable Osteoporosis Continue calcium and vitamin D supplement 
   
Code Status: full Surrogate Decision Maker:daughter  
  
DVT Prophylaxis: Lovenox GI Prophylaxis: not indicated 
  
Baseline: independent Subjective: Pt seen and examined at bedside. Willing to go home to take care of her money. Overnight events d/w RN  
 
CHIEF COMPLAINT: /u \"LF hip Pain\"  
  
Review of Systems: 
Symptom Y/N Comments  Symptom Y/N Comments Fever/Chills    Chest Pain Poor Appetite    Edema Cough    Abdominal Pain Sputum    Joint Pain SOB/BIRCH    Pruritis/Rash Nausea/vomit    Tolerating PT/OT Diarrhea    Tolerating Diet Constipation    Other Could NOT obtain due to: Poor insight Objective: VITALS:  
Last 24hrs VS reviewed since prior progress note. Most recent are: 
Patient Vitals for the past 24 hrs: 
 Temp Pulse Resp BP SpO2  
19 1614 98.4 °F (36.9 °C) 83 16 125/48 97 % 19 1116 98.4 °F (36.9 °C) 71 16 137/54 93 % 19 0939 98.5 °F (36.9 °C) 76 16 141/58 95 % 19 0505 98 °F (36.7 °C) 74 16 141/55 95 % 19 0032 97.5 °F (36.4 °C) 78 18 145/67 97 % 19 2014 98 °F (36.7 °C) 85 18 167/72 94 % Intake/Output Summary (Last 24 hours) at 2019 3010 Last data filed at 2/6/2019 2137 Gross per 24 hour Intake 480 ml Output  Net 480 ml PHYSICAL EXAM: 
General: WD, WN. Alert, cooperative, no acute distress   
EENT:  EOMI. Anicteric sclerae. MMM Resp:  CTA bilaterally, no wheezing or rales. No accessory muscle use CV:  Regular  rhythm,  No edema GI:  Soft, Non distended, Non tender.  +Bowel sounds Neurologic:  Alert, normal speech, Psych:   Poor insight. Not anxious nor agitated Skin:  No rashes. No jaundice Reviewed most current lab test results and cultures  YES Reviewed most current radiology test results   YES Review and summation of old records today    NO Reviewed patient's current orders and MAR    YES 
PMH/SH reviewed - no change compared to H&P 
________________________________________________________________________ Care Plan discussed with: 
  Comments Patient y Family RN y   
Care Manager Consultant Multidiciplinary team rounds were held today with , nursing, pharmacist and clinical coordinator. Patient's plan of care was discussed; medications were reviewed and discharge planning was addressed. ________________________________________________________________________ Total NON critical care TIME:  25 Minutes Total CRITICAL CARE TIME Spent:   Minutes non procedure based Comments >50% of visit spent in counseling and coordination of care    
________________________________________________________________________ Mckenna Doyle MD  
 
Procedures: see electronic medical records for all procedures/Xrays and details which were not copied into this note but were reviewed prior to creation of Plan. LABS: 
I reviewed today's most current labs and imaging studies. Pertinent labs include: 
Recent Labs 02/07/19 
0306 02/06/19 
0453 02/05/19 
0230 WBC 8.0 6.5 8.1 HGB 12.6 12.1 12.0 HCT 37.9 36.4 35.6  260 247 Recent Labs 02/07/19 3987 02/06/19 
0453 02/05/19 
0230  141 141  
K 4.1 3.7 3.6  107 108 CO2 25 26 25 * 104* 123* BUN 22* 19 29* CREA 0.79 0.69 0.76 CA 9.0 8.5 8.7 ALB  --   --  3.0* TBILI  --   --  0.5 SGOT  --   --  19 ALT  --   --  20 Signed: David Chacon MD

## 2019-02-07 NOTE — PROGRESS NOTES
Bedside and Verbal shift change report given to Haley Chan (oncoming nurse) by Salinas Chong RN (offgoing nurse). Report included the following information SBAR, Kardex, Procedure Summary, Intake/Output, MAR and Recent Results.

## 2019-02-07 NOTE — PROGRESS NOTES
Bedside and Verbal shift change report given to Luc Gusman (oncoming nurse) by Deborah Hutchison RN (offgoing nurse). Report included the following information SBAR, Kardex, Procedure Summary, Intake/Output, MAR and Recent Results.

## 2019-02-07 NOTE — PROGRESS NOTES
MALA informed by MD pt daughter would like a call in regards to the discharge plan. MALA contacted pt daughter who was provided the discharge plan to [facility] for rehab. Daughter provided the contact number to the facility. MALA confirmed pt had a mPOA documents on the chart. SW updated pt emergency contact info. MD stated pt would be ready for discharge tomorrow and will need transport. MALA will request transport for tomorrow morning. HALINA Vines Ext [phone]

## 2019-02-07 NOTE — PROGRESS NOTES
Occupational Therapy Orders received and medical record reviewed. Pt participated in OT Evaluation performing bed mobility, functional mobility using the rollator (uses cane inside her apt.), and lower body adls, all with supervision. She continues to complain of pain in her L thigh which caused her to stop abruptly while ambulating to the bathroom. Recommend 24 hour assistance in adls and IADLs  for safety at discharge. Full OT note to follow.

## 2019-02-08 VITALS
HEIGHT: 62 IN | BODY MASS INDEX: 22.82 KG/M2 | SYSTOLIC BLOOD PRESSURE: 151 MMHG | WEIGHT: 124 LBS | DIASTOLIC BLOOD PRESSURE: 68 MMHG | TEMPERATURE: 97.7 F | OXYGEN SATURATION: 96 % | HEART RATE: 70 BPM | RESPIRATION RATE: 18 BRPM

## 2019-02-08 LAB
ANION GAP SERPL CALC-SCNC: 7 MMOL/L (ref 5–15)
BASOPHILS # BLD: 0 K/UL (ref 0–0.1)
BASOPHILS NFR BLD: 0 % (ref 0–1)
BUN SERPL-MCNC: 22 MG/DL (ref 6–20)
BUN/CREAT SERPL: 33 (ref 12–20)
CALCIUM SERPL-MCNC: 8.9 MG/DL (ref 8.5–10.1)
CHLORIDE SERPL-SCNC: 109 MMOL/L (ref 97–108)
CO2 SERPL-SCNC: 23 MMOL/L (ref 21–32)
CREAT SERPL-MCNC: 0.67 MG/DL (ref 0.55–1.02)
DIFFERENTIAL METHOD BLD: ABNORMAL
EOSINOPHIL # BLD: 0.2 K/UL (ref 0–0.4)
EOSINOPHIL NFR BLD: 3 % (ref 0–7)
ERYTHROCYTE [DISTWIDTH] IN BLOOD BY AUTOMATED COUNT: 13.2 % (ref 11.5–14.5)
GLUCOSE SERPL-MCNC: 130 MG/DL (ref 65–100)
HCT VFR BLD AUTO: 35.1 % (ref 35–47)
HGB BLD-MCNC: 11.8 G/DL (ref 11.5–16)
IMM GRANULOCYTES # BLD AUTO: 0 K/UL (ref 0–0.04)
IMM GRANULOCYTES NFR BLD AUTO: 1 % (ref 0–0.5)
LYMPHOCYTES # BLD: 1.8 K/UL (ref 0.8–3.5)
LYMPHOCYTES NFR BLD: 27 % (ref 12–49)
MCH RBC QN AUTO: 30.9 PG (ref 26–34)
MCHC RBC AUTO-ENTMCNC: 33.6 G/DL (ref 30–36.5)
MCV RBC AUTO: 91.9 FL (ref 80–99)
MONOCYTES # BLD: 0.6 K/UL (ref 0–1)
MONOCYTES NFR BLD: 9 % (ref 5–13)
NEUTS SEG # BLD: 4 K/UL (ref 1.8–8)
NEUTS SEG NFR BLD: 60 % (ref 32–75)
NRBC # BLD: 0 K/UL (ref 0–0.01)
NRBC BLD-RTO: 0 PER 100 WBC
PLATELET # BLD AUTO: 247 K/UL (ref 150–400)
PMV BLD AUTO: 10.8 FL (ref 8.9–12.9)
POTASSIUM SERPL-SCNC: 4.1 MMOL/L (ref 3.5–5.1)
RBC # BLD AUTO: 3.82 M/UL (ref 3.8–5.2)
SODIUM SERPL-SCNC: 139 MMOL/L (ref 136–145)
WBC # BLD AUTO: 6.6 K/UL (ref 3.6–11)

## 2019-02-08 PROCEDURE — 74011250637 HC RX REV CODE- 250/637: Performed by: INTERNAL MEDICINE

## 2019-02-08 PROCEDURE — 74011250636 HC RX REV CODE- 250/636: Performed by: INTERNAL MEDICINE

## 2019-02-08 PROCEDURE — 94760 N-INVAS EAR/PLS OXIMETRY 1: CPT

## 2019-02-08 PROCEDURE — 97116 GAIT TRAINING THERAPY: CPT

## 2019-02-08 PROCEDURE — 85025 COMPLETE CBC W/AUTO DIFF WBC: CPT

## 2019-02-08 PROCEDURE — 80048 BASIC METABOLIC PNL TOTAL CA: CPT

## 2019-02-08 PROCEDURE — 97530 THERAPEUTIC ACTIVITIES: CPT

## 2019-02-08 PROCEDURE — 36415 COLL VENOUS BLD VENIPUNCTURE: CPT

## 2019-02-08 RX ORDER — ACETAMINOPHEN 500 MG
500 TABLET ORAL
Qty: 30 TAB | Refills: 0 | Status: SHIPPED
Start: 2019-02-08 | End: 2019-04-15 | Stop reason: SDUPTHER

## 2019-02-08 RX ADMIN — Medication 10 ML: at 05:01

## 2019-02-08 RX ADMIN — THERA TABS 1 TABLET: TAB at 10:17

## 2019-02-08 RX ADMIN — AMLODIPINE BESYLATE 5 MG: 5 TABLET ORAL at 10:15

## 2019-02-08 RX ADMIN — ENOXAPARIN SODIUM 30 MG: 30 INJECTION SUBCUTANEOUS at 10:17

## 2019-02-08 RX ADMIN — VITAMIN D, TAB 1000IU (100/BT) 1000 UNITS: 25 TAB at 10:16

## 2019-02-08 RX ADMIN — CALCIUM 500 MG: 500 TABLET ORAL at 10:16

## 2019-02-08 RX ADMIN — ACETAMINOPHEN 650 MG: 325 TABLET ORAL at 04:20

## 2019-02-08 NOTE — PROGRESS NOTES
Pt to discharge to [facility] SN[facility] at 11:30A. Transport changed due to SNF not being prepared to accept pt. Call report giving to floor RN. Daughter contacted to inform of transport time. Info on pt AVS. 
 
Care Management Interventions PCP Verified by CM: Yes Mode of Transport at Discharge: [place] [address] Transition of Care Consult (CM Consult): SNF [address]: Yes 
Partner SNF: Yes Discharge Durable Medical Equipment: No 
Physical Therapy Consult: Yes Occupational Therapy Consult: No 
Speech Therapy Consult: No 
Current Support Network: Other(Independent living at [facility]) Confirm Follow Up Transport: Wheel[facility] Plan discussed with Pt/Family/Caregiver: Yes Freedom of Choice Offered: Yes Discharge Location Discharge Placement: Skilled nursing facility HALINA Rojas Ext [phone]

## 2019-02-08 NOTE — ROUTINE PROCESS
2/8/2019 
07:30 AM 
 
Orthopedic End of Shift Note Bedside and Verbal shift change report given to Merrill Flores RN (oncoming nurse) by Roger Zepeda RN (offgoing nurse). Report included the following information SBAR, Kardex, ED Summary, Procedure Summary, Intake/Output, MAR, Accordion and Med Rec Status. POD# Significant issues during shift:  
  
Issues for Physician to address Nausea/Vomiting [] yes [x] no Palomo Catheter [] in [] removed Bowel Movements [] yes [x] no Foot Pumps or SCD [x] yes [] no Refused Ice Pack [] yes    [x] no Incentive Spirometer [] yes [x] no Volume:     
Telementary Monitoring   [] yes [x] no Rhythm:  
Supplemental O2 [] yes [x] no Sat off O2:  95% Patient Vitals for the past 12 hrs: 
 Temp Pulse Resp BP SpO2  
02/08/19 0348 97.9 °F (36.6 °C) 73 16 166/72 95 % 02/08/19 0017 97.5 °F (36.4 °C) 71 16 143/40 93 % 02/07/19 2021 98.4 °F (36.9 °C) 77 16 151/67 94 % Lab Results Component Value Date/Time HGB 11.8 02/08/2019 02:45 AM  
 HCT 35.1 02/08/2019 02:45 AM  
 
No results found for: INR No intake or output data in the 24 hours ending 02/08/19 0757 Peripheral Intravenous Line: 
Peripheral IV 02/05/19 Right Antecubital (Active) Site Assessment Clean, dry, & intact 2/8/2019  3:05 AM  
Phlebitis Assessment 0 2/8/2019  3:05 AM  
Infiltration Assessment 0 2/8/2019  3:05 AM  
Dressing Status Clean, dry, & intact 2/8/2019  3:05 AM  
Dressing Type Tape;Transparent 2/8/2019  3:05 AM  
Hub Color/Line Status Pink;Capped 2/8/2019  3:05 AM  
 
Drain(s):

## 2019-02-08 NOTE — PROGRESS NOTES
Orthopedic End of Shift Note Bedside and Verbal shift change report given to Michelle Guthrie (oncoming nurse) by Shyam Calle (offgoing nurse). Report included the following information SBAR, Kardex, Intake/Output and MAR. POD# n/a Significant issues during shift: memory loss Issues for Physician to address plan for discharge Nausea/Vomiting [] yes [x] no Palomo Catheter [] in [] removed voiding Bowel Movements [] yes [x] no Foot Pumps or SCD [] yes [x] no Ice Pack [] yes    [x] no Incentive Spirometer [] yes [x] no Volume:     
Telementary Monitoring   [] yes [x] no Rhythm:  
Supplemental O2 [] yes [x] no Sat off O2:   97% Patient Vitals for the past 12 hrs: 
 Temp Pulse Resp BP SpO2  
02/07/19 1614 98.4 °F (36.9 °C) 83 16 125/48 97 % 02/07/19 1116 98.4 °F (36.9 °C) 71 16 137/54 93 % 02/07/19 0939 98.5 °F (36.9 °C) 76 16 141/58 95 % Lab Results Component Value Date/Time HGB 12.6 02/07/2019 03:06 AM  
 HCT 37.9 02/07/2019 03:06 AM  
 
No results found for: INR Intake/Output Summary (Last 24 hours) at 2/7/2019 2021 Last data filed at 2/6/2019 2137 Gross per 24 hour Intake 480 ml Output  Net 480 ml Peripheral Intravenous Line: 
Peripheral IV 02/05/19 Right Antecubital (Active) Site Assessment Clean, dry, & intact 2/7/2019  9:10 AM  
Phlebitis Assessment 0 2/7/2019  9:10 AM  
Infiltration Assessment 0 2/7/2019  9:10 AM  
Dressing Status Clean, dry, & intact 2/7/2019  9:10 AM  
Dressing Type Tape;Transparent 2/7/2019  9:10 AM  
Hub Color/Line Status Pink 2/7/2019  9:10 AM  
 
Drain(s):

## 2019-02-08 NOTE — PROGRESS NOTES
Problem: Mobility Impaired (Adult and Pediatric) Goal: *Acute Goals and Plan of Care (Insert Text) Physical Therapy Goals Initiated 2/5/2019 1. Patient will move from supine to sit and sit to supine , scoot up and down and roll side to side in bed with independence within 7 day(s). MET 2. Patient will transfer from bed to chair and chair to bed with independence using the least restrictive device within 7 day(s). 3.  Patient will perform sit to stand with independence within 7 day(s). 4.  Patient will ambulate with modified independence for 300 feet with the least restrictive device within 7 day(s). physical Therapy TREATMENT Patient: Cassi Flood (27 y.o. female) Date: 2/8/2019 Diagnosis: Pubic ramus fracture ([facility] [address] Lidia Reyna Pubic ramus fracture ([facility] [address] [S32.599A] <principal problem not specified> Precautions:   
Chart, physical therapy assessment, plan of care and goals were reviewed. ASSESSMENT: 
Chart reviewed and pt cleared by RN for PT session. Pt received in bed, confused about what is going on and asking if her apartment is ok. Pt stating she needs to go to her apartment to get closed before moving. Reassured pt that someone will be able to go and get her some clothes. Also reeducated her regarding where she is going and why. Pt agreeable to PT session once she calmed down. Pt continues to demonstrate all mobility at S level, due to decreased safety. Gait slow and steady with rollator and pt continues to need vcs for safety(using brakes and not stepping away from the ad). VCs also required for transfers for the same aforementioned reasons. Pt expected to discharge to [facility] [place]. Progression toward goals: 
[x]    Improving appropriately and progressing toward goals 
[]    Improving slowly and progressing toward goals 
[]    Not making progress toward goals and plan of care will be adjusted PLAN: 
Patient continues to benefit from skilled intervention to address the above impairments. Continue treatment per established plan of care. Discharge Recommendations:  Matthias Buitrago Further Equipment Recommendations for Discharge: none SUBJECTIVE:  
Patient stated I have to move, but I need to go to my apartment.  OBJECTIVE DATA SUMMARY:  
Critical Behavior: 
Neurologic State: Alert Orientation Level: Disoriented to time, Oriented to person, Oriented to place, Oriented to situation Cognition: Follows commands Safety/Judgement: Awareness of environment, Decreased awareness of need for assistance, Decreased awareness of need for safety, Fall prevention, Home safety Functional Mobility Training: 
Bed Mobility: 
Rolling: Independent Supine to Sit: Independent Sit to Supine: Independent Scooting: Independent Transfers: 
Sit to Stand: Supervision Stand to Sit: Supervision Bed to Chair: Supervision Balance: 
Sitting: Intact Standing: Impaired Standing - Static: Constant support Standing - Dynamic : Fair(limited by L groin and thigh pain)Ambulation/Gait Training: 
Distance (ft): 200 Feet (ft) Assistive Device: Walker, rollator;Gait belt Ambulation - Level of Assistance: Stand-by assistance Gait Abnormalities: Decreased step clearance Base of Support: Widened Stance: Left decreased Speed/Ximena: Pace decreased (<100 feet/min) Step Length: Right shortened Swing Pattern: Left asymmetrical 
  
    
 
Pain: 
Pain Scale 1: Numeric (0 - 10) Pain Intensity 1: 2 Pain Location 1: Groin Pain Orientation 1: Left Pain Description 1: Aching Pain Intervention(s) 1: Medication (see MAR) Activity Tolerance:  
Good Please refer to the flowsheet for vital signs taken during this treatment. After treatment:  
[]    Patient left in no apparent distress sitting up in chair 
[x]    Patient left in no apparent distress in bed 
[x]    Call bell left within reach [x]    Nursing notified 
[]    Caregiver present [x]    Bed alarm activated COMMUNICATION/COLLABORATION:  
The patients plan of care was discussed with: Registered Nurse Shruti Galvez, PT Time Calculation: 20 mins

## 2019-02-08 NOTE — PROGRESS NOTES
Spiritual Care Partner Volunteer visited patient in Ortho on 2/7/2019. Documented by: 
Danielle Cloud MDiv Pager: [phone]

## 2019-02-08 NOTE — DISCHARGE INSTRUCTIONS
HOSPITALIST DISCHARGE INSTRUCTIONS    NAME: Sonia Dominguez   :  8/15/1929   MRN:  [id number]     Date/Time:  2019 7:42 AM    ADMIT DATE: 2019     DISCHARGE DATE: 2019     DISCHARGE DIAGNOSIS:  UTI   Acute encephalopathy with baseline possible dementia  Acute displaced left superior and inferior pubic rami fracture and sacral poly fracture  Hypertension  Osteoporosis    MEDICATIONS:  · It is important that you take the medication exactly as they are prescribed. · Keep your medication in the bottles provided by the pharmacist and keep a list of the medication names, dosages, and times to be taken in your wallet. · Do not take other medications without consulting your doctor. Pain Management: per above medications    What to do at Home    Recommended diet:  Regular Diet    Recommended activity: PT/OT Eval and Treat    If you have questions regarding the hospital related prescriptions or hospital related issues please call [facility] cyrus Machado at . If you experience any of the following symptoms then please call your primary care physician or return to the emergency room if you cannot get hold of your doctor:  Fever, chills, nausea, vomiting, diarrhea, change in mentation, falling, bleeding, shortness of breath      Information obtained by :  I understand that if any problems occur once I am at home I am to contact my physician. I understand and acknowledge receipt of the instructions indicated above.                                                                                                                                            Physician's or R.N.'s Signature                                                                  Date/Time                                                                                                                                              Patient or Representative Signature Date/Time

## 2019-02-08 NOTE — ROUTINE PROCESS
Report called to [facility]. Patient picked up by ambulatory for transport.

## 2019-02-08 NOTE — DISCHARGE SUMMARY
Hospitalist Discharge Summary     Patient ID:  Barrett Bragg  [id number]  89 y.o.  8/15/1929    PCP on record: Antoinette Lynn DO    Admit date: 2/5/2019  Discharge date and time: 2/8/2019      DISCHARGE DIAGNOSIS:  UTI   Acute encephalopathy with baseline possible dementia  Acute displaced left superior and inferior pubic rami fracture and sacral sonia fracture  Hypertension  Osteoporosis    CONSULTATIONS:  IP CONSULT TO HOSPITALIST  IP CONSULT TO ORTHOPEDIC SURGERY    Excerpted HPI from H&P of Rebecca Wesley MD:  80years old female from home with past medical history significant for hypertension, osteoporosis presented to the hospital complaining from worsening of left hip pain started about couple weeks ago patient stated she is fall from stand while she changing a light bulb in November 2018 and she was seen by the primary care physician and she had x-ray done on November 29 x-ray was negative, patient stated her left hip pain is worsening , patient denies any new fall, x-ray was done today in the hospital on show displaced left superior and inferior pubic rami fracture and sacral Sonia fracture. We were asked to admit for work up and evaluation of the above problems. ______________________________________________________________________  DISCHARGE SUMMARY/HOSPITAL COURSE:  for full details see H&P, daily progress notes, labs, consult notes.      UTI POA  Completed 3 days of IV coarse for simple cyctitis  UC with e.coli     Acute metabolic encephalopathy due to UTI with suspecting baseline dementia  Suspicion of sundowning with dementia > UTI causing worsening mental status  Mental status remain the same with poor insight though is alert and oriented to surroundings  We discussed with family about SNF and will likely need more supervision then independent living upon discharge from SNF     Acute displaced left superior and inferior pubic rami fracture and sacral sonia fracture  Supportive care  Appreciate Orthopedic input, will will continue to follow her as an OP  PT/OT > SNF  Pain Management, tylenol helping    Hypertension  Continue Norvasc  Bp stable    Osteoporosis  Continue calcium and vitamin D supplement  _______________________________________________________________________  Patient seen and examined by me on discharge day. Pertinent Findings:  Gen:    Not in distress  Chest: Clear lungs  CVS:   Regular rhythm. No edema  Abd:  Soft, not distended, not tender  Neuro:  Alert, non focal  _______________________________________________________________________  DISCHARGE MEDICATIONS:   Current Discharge Medication List      START taking these medications    Details   acetaminophen (TYLENOL) 500 mg tablet Take 1 Tab by mouth every six (6) hours as needed. Qty: 30 Tab, Refills: 0         CONTINUE these medications which have NOT CHANGED    Details   amLODIPine (NORVASC) 5 mg tablet TAKE 1 TABLET BY MOUTH DAILY. Qty: 30 Tab, Refills: 5    Associated Diagnoses: Essential hypertension      multivitamin (ONE A DAY) tablet Take 1 Tab by mouth daily. cholecalciferol (VITAMIN D3) 1,000 unit tablet Take  by mouth daily. calcium 500 mg tab Take  by mouth. STOP taking these medications                meloxicam (MOBIC) 7.5 mg tablet Comments:   Reason for Stopping:               My Recommended Diet, Activity, Wound Care, and follow-up labs are listed in the patient's Discharge Insturctions which I have personally completed and reviewed.     ______________________________________________________________________    Risk of deterioration: Moderate    Condition at Discharge:  Stable  ______________________________________________________________________    Disposition  SNF/LTC  ______________________________________________________________________    Care Plan discussed with:   Patient, Family, RN, Care Manager    ______________________________________________________________________    Code Status: Full Code  ______________________________________________________________________      Follow up with:   PCP : Asael Resendiz, DO  Follow-up Information     Follow up With Specialties Details Why Contact Info    [facility]  This is your home health agency. If you have questions regarding their services, or if you do not hear from them within 24 hours, please reach out to them directly.  [address]  [address] [address]  [phone]      Asael Resendiz, [address] [facility]   [address] [facility] [address]  [facility] [address]  [phone]      Keyla Shell MD Hand Surgery Schedule an appointment as soon as possible for a visit in 3 weeks  [facility]  [address]  [facility] [address].  [phone]                Total time in minutes spent coordinating this discharge (includes going over instructions, follow-up, prescriptions, and preparing report for sign off to her PCP) :  35 minutes    Signed:  Laura Torres MD

## 2019-02-08 NOTE — PROGRESS NOTES
Problem: Self Care Deficits Care Plan (Adult) Goal: *Acute Goals and Plan of Care (Insert Text) Occupational Therapy Goals Initiated 2/7/2019 1. Patient will perform grooming in standing with modified independence within 7 day(s). 2.  Patient will perform bathing with modified independence within 7 day(s). 3.  Patient will perform upper body dressing and lower body dressing with modified independence, including set up,  within 7 day(s). 4.  Patient will perform toilet transfers with modified independence within 7 day(s). 5.  Patient will perform all aspects of toileting with modified independence within 7 day(s). 6.  Patient will participate in upper extremity therapeutic exercise/activities with supervision/set-up for 5 minutes within 7 day(s). 7.  Patient will demonstrate safe technique while performing function mobility and adls, without verbal cues within 7 day(s). 8.  Patient will tolerate at least 12 minutes of standing adls within 7days Occupational Therapy EVALUATION Patient: Arlyn Henson (45 y.o. female) Date: 2/7/2019 Primary Diagnosis: Pubic ramus fracture ([address] [place] Pubic ramus fracture ([facility] [address] [place] Precautions: fall ASSESSMENT : 
Based on the objective data described below, the patient presents with history of falls, decreased awareness of need for assistance and safety, decreased balance, pain in Lgroin/thigh during mobility, generalized weakness and decreased endurance impairing independence and safety during adls and functional mobility. Pt is functioning below her reported  independent level for self care and mobility (uses cane in apt, rollator in motta, and cane in community). Pt needs supervison during adls and mobility due to her decreased safety awareness and decreased balance, and sudden pain during mobility that she experiences without warning. Recommend that pt have 24 hour assistance at discharge.   Would benefit from rehab at discharge to maximize independence and safety prior to pt returning to independent living apartment. [place] Patient will benefit from skilled intervention to address the above impairments. Patients rehabilitation potential is considered to be Good Factors which may influence rehabilitation potential include:  
[]             None noted [x]             Mental ability/status: ? Mild dementia, related to med. Condition? May benefit from cognitive testing. []             Medical condition []             Home/family situation and support systems [x]             Safety awareness [x]             Pain tolerance/management 
[]             Other: PLAN : 
Recommendations and Planned Interventions: 
[x]               Self Care Training                  [x]        Therapeutic Activities [x]               Functional Mobility Training    [x]        Cognitive Retraining/assessment [x]               Therapeutic Exercises           [x]        Endurance Activities [x]               Balance Training                   []        Neuromuscular Re-Education []               Visual/Perceptual Training     [x]   Home Safety Training 
[x]               Patient Education                 [x]        Family Training/Education []               Other (comment): Frequency/Duration: Patient will be followed by occupational therapy 5 times a week to address goals. Discharge Recommendations: Rehab Further Equipment Recommendations for Discharge: tbd SUBJECTIVE:  
Patient stated I'm never going to stand on a chair again.   (Per pt report---Pt fell PTA standing on a chair trying to change a light bulb on her 12 foot ceiling) OBJECTIVE DATA SUMMARY:  
HISTORY:  
Past Medical History:  
Diagnosis Date  Baker's cyst of knee  Bone spur R shoulder  Hypertension  Knee pain, right  Osteoporosis  Ovarian cyst   
 Vegetarian diet Past Surgical History:  
Procedure Laterality Date  HX ANA AND BSO Prior Level of Function/Environment/Context: Pt lives in independent living. She uses her cane in apt. Is independent in self care. She prepares one meal a day and uses her rollator to go to dining room to get a carryout meal and returns to her apt to eat it. Pt reports that she generally uses her cane when she goes on the bus for appointments or out for shopping. Ptlives on the 6 th floor and uses the elevator. Occupations in which the patient is/was successful, what are the barriers preventing that success: medical condition/history of falls Performance Patterns (routines, roles, habits, and rituals):  
Personal Interests and/or values:  
Expanded or extensive additional review of patient history: history of falls Home Situation Home Environment: Independent living(on 6th floor of [facility], uses elevator) One/Two Story Residence: One story Living Alone: Yes Support Systems: Assisted living, Friends \ neighbors Current DME Used/Available at Home: Lorin Bicker, straight, David Palafox, rollator Tub or Shower Type: Tub/Shower combination(short step in) Hand dominance: RightEXAMINATION OF PERFORMANCE DEFICITS: 
Cognitive/Behavioral Status: 
Neurologic State: Alert Orientation Level: Oriented X4 Cognition: Appropriate safety awareness;Decreased attention/concentration; Follows commands; Impulsive;Poor safety awareness Perception: Appears intact Perseveration: No perseveration noted Safety/Judgement: Awareness of environment;Decreased awareness of need for assistance;Decreased awareness of need for safety; Fall prevention;Home safety Skin: generally intact, much discoloration Edema: none observed Hearing: Auditory Auditory Impairment: None Vision/Perceptual:   
    
    
    
  
    
Acuity: (not formally assessed) Range of Motion: BUEs: 
AROM: Generally decreased, functional 
  
  
  
  
  
  
  
Strength: BUEs:  
Strength: Generally decreased, functional 
  
 generalized weakness Coordination: 
Coordination: Within functional limits Fine Motor Skills-Upper: Left Intact; Right Intact Gross Motor Skills-Upper: Left Intact; Right Intact Tone & Sensation: 
 
Tone: Normal 
Sensation: Intact Balance: 
Sitting: Intact Standing: Impaired Standing - Static: Constant support;Good Standing - Dynamic : Fair(limited by pain in L thigh/groin) Functional Mobility and Transfers for ADLs:Bed Mobility: 
Rolling: Independent Supine to Sit: Independent Sit to Supine: Independent Scooting: Independent Transfers: 
Sit to Stand: Supervision;Stand-by assistance Stand to Sit: Supervision;Stand-by assistance Bed to Chair: Supervision;Stand-by assistance Bathroom Mobility: Supervision/set up(using rollatore-at baseline using cane in her apt, ) Toilet Transfer : Supervision;Stand-by assistance(uses both hands on grab bar) ADL Assessment: 
Feeding: Independent Oral Facial Hygiene/Grooming: Stand-by assistance;Setup Bathing: Supervision;Stand-by assistance;Setup Upper Body Dressing: Stand-by assistance;Setup(able to don jacket with additional time and close stand by) Lower Body Dressing: Supervision(performed at bed level at her choice) Toileting: Stand by assistance ADL Intervention and task modifications: Pt performed functional mobility to the bathroom using her rollator. Pt able to transfer with Supervision for safety and perform toileting. Needs assistance with managing her rollator in small space safely. Pt ambulated in the motta a functional distance and was able to reach to lowest cabinets. Encouraged pt to keep items regularly used within her easy reach to prevent falls and LOB. Pt able to stand and don/doff her coat requiring additional time and supervision.   Pt educated on safe technique during adls and requested to call for assistance when needing to change positions. Pt complained of intermittent pain in her L groin/thigh that caused her to stop abruptly when ambulating to the bathroom . Pt needs supervision for safety Cognitive Retraining Safety/Judgement: Awareness of environment;Decreased awareness of need for assistance;Decreased awareness of need for safety; Fall prevention;Home safety Functional Measure: 
Barthel Index:  Pt needs supervision for safety Bathin Bladder: 10 Bowels: 10 
Groomin Dressing: 10 Feeding: 10 Mobility: 10 Stairs: 0 Toilet Use: 5 Transfer (Bed to Chair and Back): 10 Total: 70 The Barthel ADL Index: Guidelines 1. The index should be used as a record of what a patient does, not as a record of what a patient could do. 2. The main aim is to establish degree of independence from any help, physical or verbal, however minor and for whatever reason. 3. The need for supervision renders the patient not independent. 4. A patient's performance should be established using the best available evidence. Asking the patient, friends/relatives and nurses are the usual sources, but direct observation and common sense are also important. However direct testing is not needed. 5. Usually the patient's performance over the preceding 24-48 hours is important, but occasionally longer periods will be relevant. 6. Middle categories imply that the patient supplies over 50 per cent of the effort. 7. Use of aids to be independent is allowed. Wagner Lorenzo., Barthel, D.W. (1677). Functional evaluation: the Barthel Index. [address] [facility] (14)2. FABIO Raya, Robibe Romeo., Marv Rashid., [address] (). Measuring the change indisability after inpatient rehabilitation; comparison of the responsiveness of the Barthel Index and Functional [place] Measure. Journal of Neurology, Neurosurgery, and Psychiatry, 66(4), 275-438. LUCIUS Huynh, BRADY Rubin, & Bekah Miles M.A. (2004.) Assessment of post-stroke quality of life in cost-effectiveness studies: The usefulness of the Barthel Index and the EuroQoL-5D. [facility], 13, [phone] Occupational Therapy Evaluation Charge Determination History Examination Decision-Making LOW Complexity : Brief history review  MEDIUM Complexity : 3-5 performance deficits relating to physical, cognitive , or psychosocial skils that result in activity limitations and / or participation restrictions MEDIUM Complexity : Patient may present with comorbidities that affect occupational performnce. Miniml to moderate modification of tasks or assistance (eg, physical or verbal ) with assesment(s) is necessary to enable patient to complete evaluation Based on the above components, the patient evaluation is determined to be of the following complexity level: LOW Pain: 
Pain Scale 1: Numeric (0 - 10) Pain Intensity 1: 0 Activity Tolerance:  
Good, no complaints - but does have pain during mobility After treatment:  
[x] Patient left in no apparent distress sitting up in chair 
[] Patient left in no apparent distress in bed 
[x] Call bell left within reach [x] Nursing notified 
[] Caregiver present [x] Bed alarm activated COMMUNICATION/EDUCATION:  
The patients plan of care was discussed with: Physical Therapist and Registered Nurse. [x] Home safety education was provided and the patient/caregiver indicated understanding. [x] Patient/family have participated as able in goal setting and plan of care. [x] Patient/family agree to work toward stated goals and plan of care. [] Patient understands intent and goals of therapy, but is neutral about his/her participation. [] Patient is unable to participate in goal setting and plan of care. This patients plan of care is appropriate for delegation to [facility] [place].  
 
Thank you for this referral. 
 Yolis Shearing, OTR/L Time Calculation: 32 mins

## 2019-02-09 ENCOUNTER — EXTERNAL NURSING HOME DOCUMENTATION (OUTPATIENT)
Dept: INTERNAL MEDICINE CLINIC | Age: 84
End: 2019-02-09

## 2019-02-09 DIAGNOSIS — R26.9 GAIT DIFFICULTY: ICD-10-CM

## 2019-02-09 DIAGNOSIS — R41.3 MEMORY IMPAIRMENT: ICD-10-CM

## 2019-02-09 DIAGNOSIS — N30.00 ACUTE CYSTITIS WITHOUT HEMATURIA: ICD-10-CM

## 2019-02-09 DIAGNOSIS — S32.10XA CLOSED FRACTURE OF SACRUM, UNSPECIFIED PORTION OF SACRUM, INITIAL ENCOUNTER (HCC): ICD-10-CM

## 2019-02-09 DIAGNOSIS — M81.0 OSTEOPOROSIS, UNSPECIFIED OSTEOPOROSIS TYPE, UNSPECIFIED PATHOLOGICAL FRACTURE PRESENCE: ICD-10-CM

## 2019-02-09 DIAGNOSIS — S32.049A CLOSED FRACTURE OF FOURTH LUMBAR VERTEBRA, UNSPECIFIED FRACTURE MORPHOLOGY, INITIAL ENCOUNTER (HCC): ICD-10-CM

## 2019-02-09 DIAGNOSIS — S32.592A CLOSED FRACTURE OF RAMUS OF LEFT PUBIS, INITIAL ENCOUNTER (HCC): Primary | ICD-10-CM

## 2019-02-09 DIAGNOSIS — I10 ESSENTIAL HYPERTENSION: ICD-10-CM

## 2019-02-09 NOTE — PROGRESS NOTES
History of Present Illness:   Nash Ireland is an 80 y.o. white lady who was admitted to [facility] after hospitalization at [facility] with worsening left hip and pelvic pain and altered mental status. The patient had a fall a few weeks ago. X-rays were negative in the office. The situation did not improve and she ended up in the ER and found to have a UTI. X-ray and CT scan of the pelvic area showed left pubic fracture as well as sacral fractures. CT scan also showed L4 compression fracture which was mild. The patient was treated for the UTI. I reviewed the records from the hospital. The patient is a poor historian with some memory impairment. Her daughter is visiting from [place] and she is here as well. The patient tells me she is feeling better. She is ambulating with a walker. She is denying any other acute issues. The staff do not report any significant problems. She has been living in [facility] independent living, but I have recommended moving to assisted living at this time. The patient and her daughter agree. PMH:  HTN, memory impairment/dementia, osteoporosis, gate instability. PSH: Hysterectomy. SH: No tobacco or alcohol. FH: Noncontributory. Allergies: None. Medications: See NH list including Tylenol,  Norvasc, Calcium, Vitamin D and multivitamins. Studies: Labs from the hospital reviewed. BMP and CBC have been stable. Urine culture grew E.coli. Physical Examination: 
GENERAL: Vital signs stable, afebrile per nurses note. Pleasant, elderly lady sitting on the side of the bed, NAD, answers most questions properly, clearly forgetful and keeps repeating her questions. HEENT: Unremarkable. NECK: Reveals no JVD, thyromegaly or bruits HEART: Regular. LUNGS: Clear with diminished sounds. ABDOMEN: Soft and nontender.  
EXTREMITIES: Without significant edema, some DJD, some tenderness in the left groin area and worse with weightbearing. Good range of motion. BACK: Nontender. NEURO: Generalized weakness, otherwise nonfocal. 
  
Impression:  
Left pelvic/pubic fracture. Sacral fracture. Mild L4-5 fractures. UTI, 3 dip. HTN. Memory impairment/mild dementia. Osteoporosis. Gait instability. Plan: 
Continue current meds. PT and OT. Pain control. Full code. I recommend going to assisted living after discharge from here. Discussed my findings and plan with the patient and her daughter.

## 2019-02-12 ENCOUNTER — EXTERNAL NURSING HOME DOCUMENTATION (OUTPATIENT)
Dept: INTERNAL MEDICINE CLINIC | Age: 84
End: 2019-02-12

## 2019-02-12 DIAGNOSIS — M25.552 LEFT HIP PAIN: Primary | ICD-10-CM

## 2019-02-12 DIAGNOSIS — M79.605 PAIN OF LEFT LOWER EXTREMITY: ICD-10-CM

## 2019-02-12 DIAGNOSIS — S32.10XG CLOSED FRACTURE OF SACRUM WITH DELAYED HEALING, UNSPECIFIED PORTION OF SACRUM, SUBSEQUENT ENCOUNTER: ICD-10-CM

## 2019-02-12 DIAGNOSIS — M81.0 OSTEOPOROSIS, UNSPECIFIED OSTEOPOROSIS TYPE, UNSPECIFIED PATHOLOGICAL FRACTURE PRESENCE: ICD-10-CM

## 2019-02-12 DIAGNOSIS — S32.592G CLOSED FRACTURE OF MULTIPLE PUBIC RAMI, LEFT, WITH DELAYED HEALING, SUBSEQUENT ENCOUNTER: ICD-10-CM

## 2019-02-12 DIAGNOSIS — I10 ESSENTIAL HYPERTENSION: ICD-10-CM

## 2019-02-12 NOTE — PROGRESS NOTES
THIS IS NOT A COMPLETE MEDICAL RECORD ON THIS PATIENT. THIS IS A PATIENT AT [facility]. PLEASE CONTACT THE FACILITY LISTED BELOW FOR MORE INFORMATION ON THIS PATIENT. THE COMPLETE RECORD RESIDES WITH THIS LONG TERM CARE FACILITY. HISTORY OF PRESENT ILLNESS  Nick Calderón is a 80 y.o. female. This patient is currently under the care of Dr. Corrinne Spade at [facility].  The patient was admitted to this facility following hospitalization related to displaced left superior and inferior pubic rami fracture and sacral poly fracture. The patient was also treated for UTI. Her past medical history includes osteoporosis and hypertension. The patient is seen today for follow-up. She states that she is generally feeling well at the time of today's visit. She continues with left hip and thigh pain, worse with ambulation. Nursing does not report any concerns or changes in condition at this time. HPI    Review of Systems   Constitutional: Negative for chills and fever. Respiratory: Negative for cough and shortness of breath. Cardiovascular: Negative for chest pain and leg swelling. Gastrointestinal: Negative for abdominal pain. Genitourinary: Negative. Musculoskeletal: Positive for joint pain and myalgias. Neurological: Negative. Endo/Heme/Allergies: Negative. Psychiatric/Behavioral: Negative. Physical Exam   Constitutional: She is oriented to person, place, and time. She appears well-developed and well-nourished. No distress. Cardiovascular: Normal rate and regular rhythm. Pulmonary/Chest: Effort normal and breath sounds normal. No respiratory distress. She has no wheezes. She has no rales. Abdominal: Soft. She exhibits no distension. Musculoskeletal: She exhibits tenderness. She exhibits no edema. Left hip and upper leg pain, increased with ambulation   Neurological: She is alert and oriented to person, place, and time. Skin: Skin is warm and dry. Psychiatric: She has a normal mood and affect. Her behavior is normal.       ASSESSMENT and PLAN  Encounter Diagnoses   Name Primary?  Left hip pain Yes    Pain of left lower extremity     Closed fracture of multiple pubic rami, left, with delayed healing, subsequent encounter     Closed fracture of sacrum with delayed healing, unspecified portion of sacrum, subsequent encounter     Essential hypertension     Osteoporosis, unspecified osteoporosis type, unspecified pathological fracture presence      Patient to have outpatient follow-up with orthopedics. Nursing notified to schedule follow-up with Dr. Jorge Juarez. Continue PT/OT as tolerated. Reviewed medications and side effects in detail. Continue current medications at this time. Nursing to continue to monitor closely and notify MD/NP of any change in condition.

## 2019-02-14 ENCOUNTER — PATIENT OUTREACH (OUTPATIENT)
Dept: CASE MANAGEMENT | Age: 84
End: 2019-02-14

## 2019-02-14 NOTE — PROGRESS NOTES
Community Care Team documentation for patient in [facility] Initial Follow Up Patient was discharged to [facility], [facility]. Information included in this progress note has been provided to SNF. Hospital Admission and Diagnosis:  MR[facility] 2/5-2/8 UTI  
 
 RRAT Score: 7 Advance Care Planning:    Advance Directive on file PCP : Bobo Melchor,  
Nurse Navigator in PCP office: Adelaida Joe Note routed to Nurse Navigator team. 
 
SNF Attending:  Bobo Melchor Spoke with SNF team. Ensured patient arrived to SNF safely with admission packet in order. Provided needed and upcoming follow up appointments:   Austin Wahl MD in 3 weeks;  PCP follow up 3/21 at 9:15 AM at [facility] . Currently receiving skilled PT/OT services. PT goal to ambulate community distances with least restrictive devise at modified independent level. OT goal to complete ADLs and self care at modified independent level. Currently ambulating 225ft with RW and stand by assist. Completing ADLs and self care  at stand by to contact guard assist. Last therapy treat day scheduled for 2/27. Plan to discharge 2/28. Patient is transitioning from independent living at [facility] to [address] at [facility]. RUTH TBYADIRA. Community Care Team will follow up weekly with [facility] until discharge. Medications were not reconciled and general patient assessment was not completed during this [facility] outreach.

## 2019-02-20 ENCOUNTER — EXTERNAL NURSING HOME DOCUMENTATION (OUTPATIENT)
Dept: INTERNAL MEDICINE CLINIC | Age: 84
End: 2019-02-20

## 2019-02-20 DIAGNOSIS — S32.592G CLOSED FRACTURE OF MULTIPLE PUBIC RAMI, LEFT, WITH DELAYED HEALING, SUBSEQUENT ENCOUNTER: Primary | ICD-10-CM

## 2019-02-20 DIAGNOSIS — R41.3 MEMORY IMPAIRMENT: ICD-10-CM

## 2019-02-20 DIAGNOSIS — S32.10XG CLOSED FRACTURE OF SACRUM WITH DELAYED HEALING, UNSPECIFIED PORTION OF SACRUM, SUBSEQUENT ENCOUNTER: ICD-10-CM

## 2019-02-20 DIAGNOSIS — I10 ESSENTIAL HYPERTENSION: ICD-10-CM

## 2019-02-20 NOTE — PROGRESS NOTES
History of Present Illness:  Pauline Patel is an 80 y.o. white lady seen at [facility] for follow up. She came here recently after hospitalization at [facility] with left pubic and sacral fracture following a recent fall in her apartment. She is a poor historian. Overall she is feeling better, pain is less. She is doing well with PT and OT. She also has a urinary tract infection which has been treated. The staff do not report any significant new problems.    
   
Allergies: None. 
  
Medications: See NH list which is reviewed and signed.  
  
Studies: No recent studies in chart.   
  
Physical Examination: 
GENERAL: Vital signs stable, afebrile per nurses note. Pleasant elderly lady in bed, NAD, answers most questions properly. NECK: Supple. HEART: Regular. LUNGS: Clear with diminished sounds. ABDOMEN: Soft and nontender. EXTREMITIES: Without significant edema, some DJD changes. Good ROM of hips, no significant tenderness over left groin or pubic area today. NEURO: Generalized weakness, otherwise nonfocal. 
  
Impression:  
Left sacral/pubic fracture. Recent fall. Gait instability. HTN. Memory impairment. 
  
Plan: 
Continue current meds. PT and OT. Pain control. Overall stable and making progress. Full code.

## 2019-02-21 ENCOUNTER — PATIENT OUTREACH (OUTPATIENT)
Dept: CASE MANAGEMENT | Age: 84
End: 2019-02-21

## 2019-02-21 NOTE — Clinical Note
PT/OT continue. Last therapy treat day 2/27. Plan to discharge 2/28. Patient is transitioning from independent living at [facility] to [address] at [facility]. RUTH AMEZQUITA.   PCP follow up 3/1 at 11:00 AM.

## 2019-02-21 NOTE — PROGRESS NOTES
Community Care Team Documentation for Patient in Located within [facility] Subsequent Follow up Patient remains at [facility] (Located within [facility]). See previous [facility] Team notes. PCP : Patricia Nash DO 
Nurse Navigator in PCP office: Link November Spoke with SNF team.  PT/OT continue. Last therapy treat day 2/27. Plan to discharge 2/28. Patient is transitioning from independent living at [facility] to [address] at [facility]. RUTH AMEZQUITA. PCP follow up 3/1 at 11:00 AM. Medications were not reconciled and general patient assessment was not completed during this skilled nursing facility outreach.

## 2019-02-26 ENCOUNTER — EXTERNAL NURSING HOME DOCUMENTATION (OUTPATIENT)
Dept: INTERNAL MEDICINE CLINIC | Age: 84
End: 2019-02-26

## 2019-02-26 DIAGNOSIS — R41.3 MEMORY IMPAIRMENT: ICD-10-CM

## 2019-02-26 DIAGNOSIS — S32.592G CLOSED FRACTURE OF MULTIPLE PUBIC RAMI, LEFT, WITH DELAYED HEALING, SUBSEQUENT ENCOUNTER: ICD-10-CM

## 2019-02-26 DIAGNOSIS — S32.10XG CLOSED FRACTURE OF SACRUM WITH DELAYED HEALING, UNSPECIFIED PORTION OF SACRUM, SUBSEQUENT ENCOUNTER: ICD-10-CM

## 2019-02-26 DIAGNOSIS — M81.0 OSTEOPOROSIS, UNSPECIFIED OSTEOPOROSIS TYPE, UNSPECIFIED PATHOLOGICAL FRACTURE PRESENCE: ICD-10-CM

## 2019-02-26 DIAGNOSIS — M25.552 LEFT HIP PAIN: Primary | ICD-10-CM

## 2019-02-26 DIAGNOSIS — I10 ESSENTIAL HYPERTENSION: ICD-10-CM

## 2019-02-26 NOTE — PROGRESS NOTES
THIS IS NOT A COMPLETE MEDICAL RECORD ON THIS PATIENT. THIS IS A PATIENT AT [facility]. PLEASE CONTACT THE FACILITY LISTED BELOW FOR MORE INFORMATION ON THIS PATIENT. THE COMPLETE RECORD RESIDES WITH THIS LONG TERM CARE FACILITY. HISTORY OF PRESENT ILLNESS  Doreen Rose is a 80 y.o. female. This patient is currently under the care of Dr. Yue Kuo at [facility].  The patient was admitted to this facility following hospitalization related to displaced left superior and inferior pubic rami fracture and sacral poly fracture. The patient was also treated for UTI. Her past medical history includes osteoporosis and hypertension. Per social work, the patient is scheduled to discharge to [facility] on 02/28/19. Patient states she is feeling well at time of visit. Nursing does not report any concerns or changes in condition at this time. HPI    Review of Systems   Constitutional: Negative for chills and fever. Respiratory: Negative for cough and shortness of breath. Cardiovascular: Negative for chest pain and leg swelling. Gastrointestinal: Negative for abdominal pain. Genitourinary: Negative. Musculoskeletal: Positive for joint pain and myalgias. Neurological: Negative. Endo/Heme/Allergies: Negative. Psychiatric/Behavioral: Negative. Physical Exam   Constitutional: She is oriented to person, place, and time. She appears well-developed and well-nourished. No distress. Cardiovascular: Normal rate and regular rhythm. Pulmonary/Chest: Effort normal and breath sounds normal. No respiratory distress. She has no wheezes. She has no rales. Abdominal: Soft. She exhibits no distension. Musculoskeletal: She exhibits tenderness. She exhibits no edema. Left hip and upper leg pain   Neurological: She is alert and oriented to person, place, and time. Skin: Skin is warm and dry. Psychiatric: She has a normal mood and affect.  Her behavior is normal. ASSESSMENT and PLAN  Encounter Diagnoses   Name Primary?  Left hip pain Yes    Closed fracture of multiple pubic rami, left, with delayed healing, subsequent encounter     Closed fracture of sacrum with delayed healing, unspecified portion of sacrum, subsequent encounter     Essential hypertension     Osteoporosis, unspecified osteoporosis type, unspecified pathological fracture presence     Memory impairment      Will complete [address] forms, per request.  Patient to have home health services upon discharge. Will provide prescriptions for 30 day supply of medications at discharge. Patient to follow-up with PCP within 2 weeks of discharge and specialists as scheduled. Nursing to continue to monitor closely and notify MD/NP of any change in condition prior to discharge.

## 2019-02-28 ENCOUNTER — PATIENT OUTREACH (OUTPATIENT)
Dept: CASE MANAGEMENT | Age: 84
End: 2019-02-28

## 2019-02-28 NOTE — PROGRESS NOTES
Community Care Team Documentation for Patient in [facility] Discharge Note Patient has been discharged from [facility] ([facility]). See previous [facility] Team notes. PCP : Ryder Chu DO 
Nurse Navigator in PCP office: Michelle Mcdonnell Note routed to Nurse Navigator team. 
 
Spoke with SNF team.  Confirmed pt discharged today 2/28 to group home at [facility] with [facility]. PCP follow up 3/1 at 11:00 AM. Community Care Team will sign off at this time. Medications were not reconciled and general patient assessment was not completed during this skilled nursing facility outreach.

## 2019-02-28 NOTE — Clinical Note
Confirmed pt discharged today 2/28 to [facility] at [facility] with [facility].   PCP follow up 3/1 at 11:00 AM.

## 2019-03-06 ENCOUNTER — TELEPHONE (OUTPATIENT)
Dept: INTERNAL MEDICINE CLINIC | Age: 84
End: 2019-03-06

## 2019-03-06 NOTE — TELEPHONE ENCOUNTER
Patients daughter is calling to see if it is ok for her mother to take Margene Gamma for memory loss? She also wants to know how patient can obtain a pillow to sit on in the cafeteria?

## 2019-03-06 NOTE — TELEPHONE ENCOUNTER
Called back pts daughter who is asking if Trevagen OTC would be beneficial for her mothers short-term memory and if so is requesting it to be added to pt Med List in [place] for her to take daily. She also asked about a pillow for her mother to sit on just for comfort reasons when in the cafe eating her meals. Suggested to pts daughter that she get a small gel foam pillow from somewhere like [facility] as pts insurance isn't going to cover a pillow for what she's requesting it for. She verbalized understanding and is awaiting a phone call about the [facility].

## 2019-03-11 DIAGNOSIS — R41.3 MEMORY IMPAIRMENT: Primary | ICD-10-CM

## 2019-03-11 NOTE — TELEPHONE ENCOUNTER
Writer conferred with MD and supplement is prevagen and MD states ok to add to current regimen 1 tablet po every day, dgter made aware and voiced understanding

## 2019-03-11 NOTE — PROGRESS NOTES
Requested Prescriptions     Pending Prescriptions Disp Refills    OTHER 30 Tab 4     Sig: Prevagen take 1 tablet daily    PerDr. Destinee Pinto, verbal order received.

## 2019-04-12 ENCOUNTER — OFFICE VISIT (OUTPATIENT)
Dept: INTERNAL MEDICINE CLINIC | Age: 84
End: 2019-04-12

## 2019-04-12 VITALS
DIASTOLIC BLOOD PRESSURE: 54 MMHG | SYSTOLIC BLOOD PRESSURE: 118 MMHG | HEART RATE: 89 BPM | BODY MASS INDEX: 22.67 KG/M2 | RESPIRATION RATE: 22 BRPM | WEIGHT: 123.2 LBS | HEIGHT: 62 IN | TEMPERATURE: 98 F | OXYGEN SATURATION: 92 %

## 2019-04-12 DIAGNOSIS — M25.551 RIGHT HIP PAIN: Primary | ICD-10-CM

## 2019-04-12 DIAGNOSIS — M25.551 PELVIC JOINT PAIN, RIGHT: ICD-10-CM

## 2019-04-12 NOTE — PROGRESS NOTES
Health Maintenance Due Topic Date Due  
 DTaP/Tdap/Td series (1 - Tdap) 08/15/1950  Shingrix Vaccine Age 50> (1 of 2) 08/15/1979  GLAUCOMA SCREENING Q2Y  06/06/2019 Chief Complaint Patient presents with  [facility]  Pelvic Pain 1. Have you been to the ER, urgent care clinic since your last visit? Hospitalized since your last visit? No 
 
2. Have you seen or consulted any other health care providers outside of the [address] [place] [address] since your last visit? Include any pap smears or colon screening. No 
 
3) Do you have an Advance Directive on file? yes 4) Are you interested in receiving information on Advance Directives? NO Patient is accompanied by self I have received verbal consent from Patrica Hoover to discuss any/all medical information while they are present in the room.

## 2019-04-12 NOTE — PROGRESS NOTES
HISTORY OF PRESENT ILLNESS Austin Pardo is a 80 y.o. female. This is a patient of Dr. Jacque Martin who presents today from [facility]. Patient states she had a fall 3-4 days ago. She states she was reaching for a door handle and fell down onto her right side. She states since that time, each time she stands to walk she experiences severe pain to her right hip and groin area. She states she feels as though she can hardly walk now. She is taking Tylenol PRN for pain. Patient's history includes recent hospitalization in February 2019 related to Acute displaced left superior and inferior pubic rami fracture and sacral poly fracture. Visit Vitals /54 (BP 1 Location: Left arm, BP Patient Position: Sitting) Pulse 89 Temp 98 °F (36.7 °C) (Oral) Resp 22 Ht 5' 2\" (1.575 m) Wt 123 lb 3.2 oz (55.9 kg) SpO2 92% BMI 22.53 kg/m² HPI Review of Systems Constitutional: Negative for chills and fever. Respiratory: Negative for cough and shortness of breath. Cardiovascular: Negative for chest pain and leg swelling. Gastrointestinal: Negative for abdominal pain. Genitourinary: Negative. Musculoskeletal: Positive for falls and joint pain. Neurological: Negative. Endo/Heme/Allergies: Negative. Psychiatric/Behavioral: Negative. Physical Exam  
Constitutional: She is oriented to person, place, and time. She appears well-developed and well-nourished. No distress. HENT:  
Head: Normocephalic and atraumatic. Eyes: Conjunctivae are normal.  
Cardiovascular: Normal rate and regular rhythm. Pulmonary/Chest: Effort normal and breath sounds normal. No respiratory distress. She has no wheezes. She has no rales. Abdominal: Soft. Bowel sounds are normal. She exhibits no distension. There is no tenderness. Musculoskeletal: She exhibits tenderness. She exhibits no edema. Right hip and pelvic pain Neurological: She is alert and oriented to person, place, and time. Skin: Skin is warm and dry. Psychiatric: She has a normal mood and affect. Her behavior is normal.  
Nursing note and vitals reviewed. ASSESSMENT and PLAN Encounter Diagnoses Name Primary?  Right hip pain Yes  Pelvic joint pain, right Orders Placed This Encounter  XR HIP RT W OR WO PELV 2-3 VWS  XR FEMUR RT 2 VS  
 
 
Reviewed medications and side effects in detail Patient encouraged to call or return to office if symptoms do not improve or worsen. Reviewed plan of care with patient who acknowledges understanding and agrees.

## 2019-04-15 ENCOUNTER — HOSPITAL ENCOUNTER (EMERGENCY)
Age: 84
Discharge: HOME | End: 2019-04-15
Attending: STUDENT IN AN ORGANIZED HEALTH CARE EDUCATION/TRAINING PROGRAM
Payer: MEDICARE

## 2019-04-15 ENCOUNTER — APPOINTMENT (OUTPATIENT)
Dept: GENERAL RADIOLOGY | Age: 84
End: 2019-04-15
Attending: STUDENT IN AN ORGANIZED HEALTH CARE EDUCATION/TRAINING PROGRAM
Payer: MEDICARE

## 2019-04-15 VITALS
SYSTOLIC BLOOD PRESSURE: 143 MMHG | DIASTOLIC BLOOD PRESSURE: 78 MMHG | HEART RATE: 90 BPM | RESPIRATION RATE: 18 BRPM | OXYGEN SATURATION: 96 % | TEMPERATURE: 98.9 F

## 2019-04-15 DIAGNOSIS — S32.591A: Primary | ICD-10-CM

## 2019-04-15 PROCEDURE — 97116 GAIT TRAINING THERAPY: CPT

## 2019-04-15 PROCEDURE — 97161 PT EVAL LOW COMPLEX 20 MIN: CPT

## 2019-04-15 PROCEDURE — 74011250637 HC RX REV CODE- 250/637: Performed by: STUDENT IN AN ORGANIZED HEALTH CARE EDUCATION/TRAINING PROGRAM

## 2019-04-15 PROCEDURE — 73502 X-RAY EXAM HIP UNI 2-3 VIEWS: CPT

## 2019-04-15 PROCEDURE — 99284 EMERGENCY DEPT VISIT MOD MDM: CPT

## 2019-04-15 RX ORDER — ACETAMINOPHEN 500 MG
500 TABLET ORAL
Qty: 30 TAB | Refills: 0 | Status: SHIPPED | OUTPATIENT
Start: 2019-04-15 | End: 2019-04-25 | Stop reason: DRUGHIGH

## 2019-04-15 RX ORDER — ACETAMINOPHEN 325 MG/1
650 TABLET ORAL
Status: COMPLETED | OUTPATIENT
Start: 2019-04-15 | End: 2019-04-15

## 2019-04-15 RX ADMIN — ACETAMINOPHEN 650 MG: 325 TABLET ORAL at 16:49

## 2019-04-15 NOTE — PROGRESS NOTES
Date of previous inpatient admission/ ED visit? 2/5/19-2/8/19 Inpatient Admission )Pubic Ramus Fracture What brought the patient back to ED? Patient presents to the ED for fall evaluation Did patient decline recommended services during last admission/ ED visit (if yes, what)? No 
 
Has patient seen a provider since their last inpatient admission/ED visit (if yes, when)? Yes. PCP is Dr. Erica Coelho patient seen by Janae Rapp on 4/12/19 CM Interventions: 
From previous inpatient admission/ED visit: Assessment From current inpatient admission/ED visit: Assessment SSED/CM consult received and appreciated. EMR reviewed. Met w/patient and introduced to role of CM. [place] previous [place] resident at [facility] for 4 years and recent placement to [address] [place] [address] ( noted February 2019). Daughter Stephany Sanders resides in [place]. Noted SNF placement 2/8/19 to [employer] [facility] follow up. Patient reports having a fall 5 days ago.  asking CM if its time to go home and is ready to go home. CM acknowledged pending recommendation/ results . Patient states being able to tolerate laying in the bed and sitting however increased pain trying to walk. DME is cane.  verbalizes moving from [place] 60 years ago and \" [place]. \" Patient retired from [employer] shared in [place] would have had to wait until was 36 yrs old to work instead of 12 in the [address]. 
 
Case Management Department will follow for transitions of care needs. Care Management Interventions PCP Verified by CM: Yes Last Visit to PCP: 04/12/19 Palliative Care Criteria Met (RRAT>21 & CHF Dx)?: No 
Transition of Care Consult (CM Consult): Discharge Planning MyChart Signup: No 
Discharge Durable Medical Equipment: No 
Health Maintenance Reviewed: Yes Physical Therapy Consult: Yes Occupational Therapy Consult: No 
Speech Therapy Consult: No 
Current Support Network: Assisted Living Confirm Follow Up Transport: (TBD) Plan discussed with Pt/Family/Caregiver: Yes [employer] Information Provided?: No 
Discharge Location Discharge Placement: (TBD)

## 2019-04-15 NOTE — PROGRESS NOTES
Home Care Face to Face Encounter Patients Name: Sharonda Aquino    YOB: 1929 Primary Diagnosis: falls; fractures Date of Face to Face:   4/15/19 Face to Face Encounter findings are related to primary reason for home care:   yes. 1. I certify that the patient needs intermittent care as follows: physical therapy: transfer training, gait/stair training, balance training and pt/caregiver education 
occupational therapy:  ADL safety (ie. bathing, dressing) and pt/caregiver education 2. I certify that this patient is homebound, that is: 1) patient requires the use of a walker device, special transportation, or assistance of another to leave the home and 2) patient has a normal inability to leave the home and leaving the home requires considerable and taxing effort. Patient may leave the home for infrequent and short duration for medical reasons, and occasional absences for non-medical reasons. Homebound status is due to the following functional limitations: Patient's ambulation limited secondary to severe pain and requires the use of an assistive device and the assistance of a caregiver for safe completion. Patient with strength and ROM deficits limiting ambulation endurance requiring the use of an assistive device and the assistance of a caregiver. 3. I certify that this patient is under my care and that I, or a nurse practitioner or  [id number], or clinical nurse specialist, or certified nurse midwife, working with me, had a Face-to-Face Encounter that meets the physician Face-to-Face Encounter requirements. The following are the clinical findings from the [address] [place] [address] encounter that support the need for skilled services and is a summary of the encounter:  
 
See discharge summary Ivana Lan PT 
4/15/2019 THE FOLLOWING TO BE COMPLETED BY THE REFERRING PHYSICIAN: 
 
 I concur with the findings described above from the F2F encounter that this patient is homebound and in need of a skilled service. Certifying Physician: _____________________________________ Printed Certifying Physician Name: Dr Pat Diop Date: 4/15/19 THE PATIENT WILL BE FOLLOWED IN THE COMMUNITY BY THEIR PRIMARY CARE PHYSICIAN

## 2019-04-15 NOTE — ED TRIAGE NOTES
TRIAGE NOTE:  Patient arrives by EMS from [facility] for c/o hairline fracture right hip. Patient had fall on 4/8 and staff reports noticed her gait was different and did xray. Patient has been ambulating since fall. Patient has history of dementia, currently denies pain.

## 2019-04-15 NOTE — PROGRESS NOTES
Updates received from [facility] provided to HALINA Marcum . Patient is a resident at [facility] and [facility] past. AMD on file Daughter Phillip Capellan is MPOA lives in [place].

## 2019-04-15 NOTE — ED PROVIDER NOTES
80 y.o. f with Acute displaced left superior and inferior pubic rami fracture and sacral poly fracture 2/2019 who fell 4/8 again, a few days ago had portable xr with ?hairline femur fracture. No reports or images sent. Patient ambulatory with discomfort. The history is provided by the patient. Hip Injury This is a new problem. The problem occurs constantly. The pain is present in the right hip. Pertinent negatives include no numbness, full range of motion, no stiffness, no tingling and no back pain. The symptoms are aggravated by heat. She has tried OTC pain medications for the symptoms. The treatment provided mild relief. Past Medical History:  
Diagnosis Date  Baker's cyst of knee  Bone spur R shoulder  Hypertension  Knee pain, right  Osteoporosis  Ovarian cyst   
 Vegetarian diet Past Surgical History:  
Procedure Laterality Date  HX ANA AND BSO History reviewed. No pertinent family history. Social History Socioeconomic History  Marital status:  Spouse name: Not on file  Number of children: Not on file  Years of education: Not on file  Highest education level: Not on file Occupational History  Not on file Social Needs  Financial resource strain: Not on file  Food insecurity:  
  Worry: Not on file Inability: Not on file  Transportation needs:  
  Medical: Not on file Non-medical: Not on file Tobacco Use  Smoking status: Never Smoker  Smokeless tobacco: Never Used Substance and Sexual Activity  Alcohol use: No  
 Drug use: No  
 Sexual activity: Not Currently Birth control/protection: None Comment: ,have 2 children. ,living in [address] [facility] living Lifestyle  Physical activity:  
  Days per week: Not on file Minutes per session: Not on file  Stress: Not on file Relationships  Social connections:  
  Talks on phone: Not on file Gets together: Not on file Attends [employer] service: Not on file Active member of club or organization: Not on file Attends meetings of clubs or organizations: Not on file Relationship status: Not on file  Intimate partner violence:  
  Fear of current or ex partner: Not on file Emotionally abused: Not on file Physically abused: Not on file Forced sexual activity: Not on file Other Topics Concern  Not on file Social History Narrative  Not on file ALLERGIES: Patient has no known allergies. Review of Systems Constitutional: Negative for chills and fever. Eyes: Negative for photophobia. Respiratory: Negative for shortness of breath. Cardiovascular: Negative for chest pain. Gastrointestinal: Negative for abdominal pain. Genitourinary: Negative for dysuria. Musculoskeletal: Negative for back pain and stiffness. Neurological: Negative for tingling, numbness and headaches. Psychiatric/Behavioral: Negative for confusion. All other systems reviewed and are negative. Vitals:  
 04/15/19 1510 BP: 143/78 Pulse: 90 Resp: 18 Temp: 98.9 °F (37.2 °C) SpO2: 96% Physical Exam  
Constitutional: She is oriented to person, place, and time. She appears well-developed. No distress. HENT:  
Head: Atraumatic. Mouth/Throat: No oropharyngeal exudate. Eyes: Pupils are equal, round, and reactive to light. Cardiovascular: Normal rate and intact distal pulses. Pulmonary/Chest: Effort normal. She exhibits tenderness. Abdominal: Soft. She exhibits no distension. There is no tenderness. Musculoskeletal: She exhibits no deformity. Pain with external rotation, r thigh. No shortnening. Nl pedal pulses. Pain with ambulation, antalgic gait Neurological: She is alert and oriented to person, place, and time. Skin: Skin is warm and dry. Capillary refill takes less than 2 seconds. Psychiatric: She has a normal mood and affect. Nursing note and vitals reviewed. MDM Number of Diagnoses or Management Options Multiple closed stable fractures of ramus of right pubis, initial encounter [facility]):  
Diagnosis management comments: Abhi Prince is a 80 y.o. female with past medical history notable for mild dementia presenting with pain after a fall several days ago. Multiple nonsurgical pelvic fractures, very mobile, PT eval complete, able to perform ADLS, will return to assisted living. Procedures

## 2019-04-15 NOTE — PROGRESS NOTES
physical Therapy Emergency Department EVALUATION Patient: Leamon Blizzard (73 y.o. female) Date: 4/15/2019 Primary Diagnosis: No admission diagnoses are documented for this encounter. Precautions:  Fall ASSESSMENT : 
 
Based on the objective data described below, the patient presents with RLE pain making it difficult to stand and walk. She has subacute left pubic rami fractures from a fall Feb 2019 and now with acute right pubic rami fractures related to a fall April 8, 2019. Per facility staff, the patient has been ambulatory and performs her own transfers since the fall. They noticed her having increased difficulty walking today. Therapy was consulted in the ED to assess patient's ability to walk and if appropriate to return home. On initial exam, patient was unable to walk unassisted with her cane due to her pain. Gait was re-assessed using a rollator walker and found to be improved though still impaired due to pain (slides right foot vs swing d/t difficulty with hip flexion). She was at supervision level of assistance ambulating with the rollator walker. Patient endorses owning a rollator walker. Recommended she stop using the cane and rely on the rollator walker at all times. Patient voiced understanding and agreement though is observed to be a poor historian/ forgetful. Patient is appropriate to return to her [facility] with assistance of staff and resumption of home health therapy as stated below. She does not have her walker with her in the ED. If discharged from the ED this evening, she will require transport home to the 4th floor for pass off to nursing staff at the facility. Report provided to care management, MD, RN. PLAN : 
Discharge Recommendations:  
 
[]   Home with family []   Skilled nursing facility []   Admission to hospital with rehab likely needed 
[]   Inpatient rehab referral 
[]   Outpatient physical therapy referral 
 [x]   Other: When medically cleared, return to the [facility] with home health physical therapy & occupational therapy for home safety assessment, mobility & ADL training, and staff education re: level of assistance required to maintain patient safety. Further Equipment Recommendations for Discharge:  None - owns rollator walker and single point cane 
[]   Rolling walker with 5\" wheels 
[]   Crutches  
[]   [employer]  
[]   Wheelchair  
[]   Other: COMMUNICATION/EDUCATION:  
Communication/Collaboration: 
[x]   Fall prevention education was provided and the patient/caregiver indicated understanding. [x]   Patient/family have participated as able and agree with findings and recommendations. []   Patient is unable to participate in plan of care at this time. Findings and recommendations were discussed with:  Physician, RN, and Care Management SUBJECTIVE:  
Patient stated Jaren Torres can I go home? I've been here all day. I want to go home. I'm tired.  OBJECTIVE DATA SUMMARY:  
HISTORY:   
Past Medical History:  
Diagnosis Date  Baker's cyst of knee  Bone spur R shoulder  Hypertension  Knee pain, right  Osteoporosis  Ovarian cyst   
 Vegetarian diet Past Surgical History:  
Procedure Laterality Date  HX ANA AND BSO Prior Level of Function/Home Situation: Independent with her rollator walker and with transfers. H/o falls. Resides at [address] [place] [address]. After numerous unsuccessful attempts to reach nsg at the [facility], this PT was able to speak with Alvina Strong CNA at [facility]. Alvina Strong was unaware patient was in the ED. She notes that patient's fall earlier this month occurred when patient was alone in her room and that patient has been ambulatory and performing her own transfers since the fall. Personal factors and/or comorbidities impacting plan of care: Forgetful Home Situation Home Environment: Assisted living Care Facility Name: [facility] # Steps to Enter: 0 Living Alone: No 
Support Systems: Assisted living Patient Expects to be Discharged to[de-identified] Assisted living Current DME Used/Available at Home: Norlene Burrell, rollator, Dietra Hives, straight EXAMINATION/PRESENTATION/DECISION MAKING: Critical Behavior: 
Neurologic State: Alert Orientation Level: Oriented to person(Oriented to being in the hosp) Cognition: Follows commands, Impaired decision making Hearing: Auditory Auditory Impairment: None Range Of Motion: 
  
Decreased, functional BLE (pain limits RLE) Functional BUE Strength:   
  
Decreased, functional BLE (pain limits) Coordination: 
Functional 
Vision: Able to read clock on the wall and paper work in hand Functional Mobility: 
Bed Mobility: 
  
 Patient in a chair room. CNA reports patient does not sleep in a bed. She sleeps sitting up in a sachin lounge type chair. Transfers: 
  
 Sit to stand to sit: CGA. Balance:  
Sitting: good Standing: Impaired - constant support required Ambulation/Gait Training: 
  
Single point cane - Min hand held assist for support. Unable to clear right foot d/t pain, shortened strides, antalgic. Rollator walker - Supervision, conts to slide right foot 75% of the time, near normal stride lengths, less antalgic, steady with the walker. Ambulated 60 ft. Special Tests: 
Tinetti test: 
 
Sitting Balance: 1 Arises: 1 Attempts to Rise: 1 Immediate Standing Balance: 1 Standing Balance: 1 Nudged: 0 Eyes Closed: 0 Turn 360 Degrees - Continuous/Discontinuous: 1 Turn 360 Degrees - Steady/Unsteady: 1 Sitting Down: 1 Balance Score: 8 Indication of Gait: 1 
R Step Length/Height: 1 L Step Length/Height: 1 
R Foot Clearance: 0(shuffles to avoid pain not actual foot drop) L Foot Clearance: 0 Step Symmetry: 0 Step Continuity: 1 Path: 1 Trunk: 0 Walking Time: 1 Gait Score: 6 Total Score: 14 
 
 
 
 Tinetti Tool Score Risk of Falls 
<19 = High Fall Risk 19-24 = Moderate Fall Risk 25-28 = Low Fall Risk Tinetti ME. Performance-Oriented Assessment of Mobility Problems in Elderly Patients. [facility] 66; [id number]. (Scoring Description: PT Bulletin Feb. 10, 1993) Older adults: Elizabeth Madrigal et al, 2009; n = [address] elderly evaluated with ABC, ISAAC, ADL, and IADL) · Mean ISAAC score for males aged 69-68 years = 26.21(3.40) · Mean ISAAC score for females age 69-68 years = 25.16(4.30) · Mean ISAAC score for males over 80 years = 23.29(6.02) · Mean ISAAC score for females over 80 years = 17.20(8.32) Physical Therapy Evaluation Charge Determination History Examination Presentation Decision-Making MEDIUM  Complexity : 1-2 comorbidities / personal factors will impact the outcome/ POC  LOW Complexity : 1-2 Standardized tests and measures addressing body structure, function, activity limitation and / or participation in recreation  MEDIUM Complexity : Evolving with changing characteristics  LOW Complexity : FOTO score of  Based on the above components, the patient evaluation is determined to be of the following complexity level: LOW Pain: 
Right anterior thigh and groin Patient did not quantify her pain. Pain worsens with RLE weight bearing. Pain improves with sitting/ non weight bearing, rest 
 
  
  
  
  
Activity Tolerance:  
Fair - pain limits. After treatment:  
[x]         Patient left in no apparent distress sitting up in chair 
[]         Patient left in no apparent distress in bed 
[]         Call bell left within reach [x]         Nursing notified and in the room  
[]         Caregiver present 
[]         Bed alarm activated Thank you for this referral. 
Woo Gonzalez, PT Time Calculation: 52 mins

## 2019-04-15 NOTE — PROGRESS NOTES
Reviewed medical chart; Patient is ready for discharge back to [facility] this evening. Notified RN and requested [facility] orders. Sent referral to [facility] ([facility] via [employer]; CM to follow-up tomorrow to confirm [facility] services. Sent referral to [facility] ([phone]) via [employer] and requested 8:00PM transportation. [facility] confirmed 8:30PM transportation time. Completed ambulance forms. Notified RN of patient's discharge plan. Call placed to patient's daughter, Jerry GOMEZ# [phone] to confirm discharge plan. Family is in agreement with plan. CM available in case needs arise. Francheska Castro MSW,CRM

## 2019-04-15 NOTE — ED NOTES
1950: [address] to attempt to give report to receiving nurse. Phone continued to disconnect. Only person(s) I was able to speak to was Melvin, at the front gate.

## 2019-04-25 ENCOUNTER — OFFICE VISIT (OUTPATIENT)
Dept: INTERNAL MEDICINE CLINIC | Age: 84
End: 2019-04-25

## 2019-04-25 VITALS
HEART RATE: 86 BPM | DIASTOLIC BLOOD PRESSURE: 66 MMHG | HEIGHT: 62 IN | WEIGHT: 121.6 LBS | SYSTOLIC BLOOD PRESSURE: 140 MMHG | RESPIRATION RATE: 20 BRPM | BODY MASS INDEX: 22.38 KG/M2 | OXYGEN SATURATION: 97 % | TEMPERATURE: 98.5 F

## 2019-04-25 DIAGNOSIS — R26.9 GAIT DIFFICULTY: ICD-10-CM

## 2019-04-25 DIAGNOSIS — M89.9 PUBIC BONE PAIN: Primary | ICD-10-CM

## 2019-04-25 DIAGNOSIS — R41.3 MEMORY IMPAIRMENT: ICD-10-CM

## 2019-04-25 DIAGNOSIS — M81.0 OSTEOPOROSIS, UNSPECIFIED OSTEOPOROSIS TYPE, UNSPECIFIED PATHOLOGICAL FRACTURE PRESENCE: ICD-10-CM

## 2019-04-25 DIAGNOSIS — I10 ESSENTIAL HYPERTENSION: ICD-10-CM

## 2019-04-25 DIAGNOSIS — S32.591S CLOSED FRACTURE OF RAMUS OF RIGHT PUBIS, SEQUELA: ICD-10-CM

## 2019-04-25 RX ORDER — RISEDRONATE SODIUM 150 MG/1
150 TABLET, FILM COATED ORAL
Qty: 4 TAB | Refills: 3 | Status: SHIPPED | OUTPATIENT
Start: 2019-04-25 | End: 2020-06-25 | Stop reason: ALTCHOICE

## 2019-04-25 RX ORDER — DEXTROMETHORPHAN HYDROBROMIDE, GUAIFENESIN 5; 100 MG/5ML; MG/5ML
650 LIQUID ORAL EVERY 8 HOURS
Qty: 90 TAB | Refills: 11 | Status: SHIPPED | OUTPATIENT
Start: 2019-04-25

## 2019-04-25 NOTE — PROGRESS NOTES
Health Maintenance Due   Topic Date Due    DTaP/Tdap/Td series (1 - Tdap) 08/15/1950    Shingrix Vaccine Age 50> (1 of 2) 08/15/1979    GLAUCOMA SCREENING Q2Y  06/06/2019       Chief Complaint   Patient presents with   Francis Ruelas Fall     f/u for fall    Hip Pain    Results     X-Ray results       1. Have you been to the ER, urgent care clinic since your last visit? Hospitalized since your last visit? No    2. Have you seen or consulted any other health care providers outside of the [address] [place] [address] since your last visit? Include any pap smears or colon screening. No    3) Do you have an Advance Directive on file? yes    4) Are you interested in receiving information on Advance Directives? NO      Patient is accompanied by self I have received verbal consent from Austin Pardo to discuss any/all medical information while they are present in the room.

## 2019-05-06 NOTE — PROGRESS NOTES
HISTORY OF PRESENT ILLNESS  Seven Garces is a 80 y.o. female. Pt. comes in for f/u. Has multiple medical problems. Reports having another fall a few weeks ago. X-ray showed new right pubic fracture. Pain is worse with certain motions and walking. Tylenol helps. Memory is poor but stable. Reports compliance with medications and diet. Med list and most recent labs/studies reviewed with pt. Trying to be active physically as tolerated. Reports no other new c/o. HPI    Review of Systems   Constitutional: Negative. HENT: Positive for hearing loss. Eyes: Negative. Respiratory: Negative. Negative for shortness of breath. Cardiovascular: Negative for chest pain and leg swelling. Gastrointestinal: Negative. Negative for abdominal pain. Genitourinary: Negative. Musculoskeletal: Positive for falls and joint pain. Skin: Negative. Neurological: Negative. Endo/Heme/Allergies: Negative. Psychiatric/Behavioral: Positive for memory loss. Physical Exam   Constitutional: She is oriented to person, place, and time. She appears well-developed and well-nourished. No distress. HENT:   Head: Normocephalic and atraumatic. Mouth/Throat: Oropharynx is clear and moist.   Eyes: Conjunctivae are normal.   Neck: Normal range of motion. Neck supple. No JVD present. No thyromegaly present. Cardiovascular: Normal rate, regular rhythm, normal heart sounds and intact distal pulses. No murmur heard. Pulmonary/Chest: Effort normal and breath sounds normal. No respiratory distress. She has no wheezes. She has no rales. Abdominal: Soft. Bowel sounds are normal. She exhibits no distension. Musculoskeletal: She exhibits tenderness (R groin/pubic bone). She exhibits no edema. DJD  Walks with a limp because of pain   Neurological: She is alert and oriented to person, place, and time. Coordination abnormal.   Mild memory issues   Skin: Skin is warm and dry. No rash noted.    Psychiatric: She has a normal mood and affect. Her behavior is normal.   Nursing note and vitals reviewed. ASSESSMENT and PLAN  Diagnoses and all orders for this visit:    1. Pubic bone pain    2. Closed fracture of ramus of right pubis, sequela    3. Essential hypertension    4. Osteoporosis, unspecified osteoporosis type, unspecified pathological fracture presence    5. Gait difficulty    6. Memory impairment    Other orders  -     acetaminophen (TYLENOL) 650 mg TbER; Take 1 Tab by mouth every eight (8) hours. -     risedronate (ACTONEL) 150 mg tablet; Take 1 Tab by mouth every thirty (30) days. Follow-up and Dispositions    · Return in about 6 weeks (around 6/6/2019).      lab results and schedule of future lab studies reviewed with patient  reviewed diet, exercise and weight control  reviewed medications and side effects in detail  Fall precautions discussed

## 2019-06-06 ENCOUNTER — OFFICE VISIT (OUTPATIENT)
Dept: INTERNAL MEDICINE CLINIC | Age: 84
End: 2019-06-06

## 2019-06-06 VITALS
DIASTOLIC BLOOD PRESSURE: 76 MMHG | WEIGHT: 124 LBS | RESPIRATION RATE: 18 BRPM | HEART RATE: 85 BPM | BODY MASS INDEX: 22.82 KG/M2 | SYSTOLIC BLOOD PRESSURE: 138 MMHG | OXYGEN SATURATION: 96 % | TEMPERATURE: 98.4 F | HEIGHT: 62 IN

## 2019-06-06 DIAGNOSIS — M17.0 PRIMARY OSTEOARTHRITIS OF BOTH KNEES: ICD-10-CM

## 2019-06-06 DIAGNOSIS — I10 ESSENTIAL HYPERTENSION: Primary | ICD-10-CM

## 2019-06-06 DIAGNOSIS — R41.3 MEMORY IMPAIRMENT: ICD-10-CM

## 2019-06-06 DIAGNOSIS — R26.9 GAIT DIFFICULTY: ICD-10-CM

## 2019-06-06 NOTE — PROGRESS NOTES
Health Maintenance Due   Topic Date Due    DTaP/Tdap/Td series (1 - Tdap) 08/15/1950    Shingrix Vaccine Age 50> (1 of 2) 08/15/1979    GLAUCOMA SCREENING Q2Y  06/06/2019       Chief Complaint   Patient presents with    Hypertension     6 wk f/u    Osteoporosis    Memory Loss       1. Have you been to the ER, urgent care clinic since your last visit? Hospitalized since your last visit? No    2. Have you seen or consulted any other health care providers outside of the [address] [place] [address] since your last visit? Include any pap smears or colon screening. No    3) Do you have an Advance Directive on file? yes    4) Are you interested in receiving information on Advance Directives? NO      Patient is accompanied by self I have received verbal consent from Seven Garces to discuss any/all medical information while they are present in the room.

## 2019-06-06 NOTE — PROGRESS NOTES
HISTORY OF PRESENT ILLNESS  Hansel Castillo is a 80 y.o. female. Pt. comes in from assisted living for f/u. Has multiple medical problems. She is a poor historian with some mild dementia. Unsteady gait but no falls. Uses a walker. Has had bilateral pubic fractures because of falls in the past.  BP is stable. Has chronic arthritic pains. Tylenol as needed helps. Reports compliance with medications and diet. Med list and most recent labs/studies reviewed with pt. Trying to be active physically as tolerated. Reports no other new c/o. HPI    Review of Systems   Constitutional: Negative. HENT: Positive for hearing loss. Eyes: Negative. Respiratory: Negative. Negative for shortness of breath. Cardiovascular: Negative for chest pain and leg swelling. Gastrointestinal: Negative. Negative for abdominal pain. Genitourinary: Negative. Musculoskeletal: Positive for joint pain. Negative for falls. Skin: Negative. Neurological: Negative. Endo/Heme/Allergies: Negative. Psychiatric/Behavioral: Positive for memory loss. Negative for depression. The patient is not nervous/anxious and does not have insomnia. Physical Exam   Constitutional: She is oriented to person, place, and time. She appears well-developed and well-nourished. No distress. HENT:   Head: Normocephalic and atraumatic. Mouth/Throat: Oropharynx is clear and moist.   Eyes: Conjunctivae are normal.   Neck: Normal range of motion. Neck supple. No JVD present. No thyromegaly present. Cardiovascular: Normal rate, regular rhythm, normal heart sounds and intact distal pulses. No murmur heard. Pulmonary/Chest: Effort normal and breath sounds normal. No respiratory distress. She has no wheezes. She has no rales. Abdominal: Soft. Bowel sounds are normal. She exhibits no distension. Musculoskeletal: She exhibits no edema or tenderness. DJD     Neurological: She is alert and oriented to person, place, and time. Coordination abnormal.   Mild memory issues   Skin: Skin is warm and dry. No rash noted. Psychiatric: She has a normal mood and affect. Her behavior is normal.   Nursing note and vitals reviewed. ASSESSMENT and PLAN  Diagnoses and all orders for this visit:    1. Essential hypertension    2. Gait difficulty    3. Memory impairment    4. Primary osteoarthritis of both knees      Follow-up and Dispositions    · Return in about 4 months (around 10/6/2019).      lab results and schedule of future lab studies reviewed with patient  reviewed diet, exercise and weight control  reviewed medications and side effects in detail  Fall precautions discussed  Overall stable  Note sent to assisted living

## 2019-08-27 RX ORDER — MELATONIN
1000 DAILY
Qty: 90 TAB | Refills: 3 | Status: SHIPPED | OUTPATIENT
Start: 2019-08-27 | End: 2020-01-14 | Stop reason: SDUPTHER

## 2019-10-03 ENCOUNTER — OFFICE VISIT (OUTPATIENT)
Dept: INTERNAL MEDICINE CLINIC | Age: 84
End: 2019-10-03

## 2019-10-03 VITALS
OXYGEN SATURATION: 99 % | DIASTOLIC BLOOD PRESSURE: 66 MMHG | HEART RATE: 66 BPM | SYSTOLIC BLOOD PRESSURE: 128 MMHG | HEIGHT: 62 IN | WEIGHT: 128 LBS | RESPIRATION RATE: 18 BRPM | BODY MASS INDEX: 23.55 KG/M2 | TEMPERATURE: 97.5 F

## 2019-10-03 DIAGNOSIS — R41.3 MEMORY IMPAIRMENT: ICD-10-CM

## 2019-10-03 DIAGNOSIS — R26.9 GAIT DIFFICULTY: ICD-10-CM

## 2019-10-03 DIAGNOSIS — Z23 ENCOUNTER FOR IMMUNIZATION: ICD-10-CM

## 2019-10-03 DIAGNOSIS — G89.29 CHRONIC PAIN OF LEFT KNEE: Primary | ICD-10-CM

## 2019-10-03 DIAGNOSIS — I10 ESSENTIAL HYPERTENSION: ICD-10-CM

## 2019-10-03 DIAGNOSIS — M17.0 PRIMARY OSTEOARTHRITIS OF BOTH KNEES: ICD-10-CM

## 2019-10-03 DIAGNOSIS — M25.562 CHRONIC PAIN OF LEFT KNEE: Primary | ICD-10-CM

## 2019-10-03 RX ORDER — DICLOFENAC SODIUM 10 MG/G
GEL TOPICAL
Qty: 100 G | Refills: 5 | Status: SHIPPED | OUTPATIENT
Start: 2019-10-03 | End: 2019-10-08 | Stop reason: SDUPTHER

## 2019-10-03 NOTE — PROGRESS NOTES
Health Maintenance Due   Topic Date Due    DTaP/Tdap/Td series (1 - Tdap) 08/15/1950    Shingrix Vaccine Age 50> (1 of 2) 08/15/1979    GLAUCOMA SCREENING Q2Y  06/06/2019    Influenza Age 5 to Adult  08/01/2019       Chief Complaint   Patient presents with    Hypertension     4 month follow up    Osteoarthritis    Memory Loss       1. Have you been to the ER, urgent care clinic since your last visit? Hospitalized since your last visit? No    2. Have you seen or consulted any other health care providers outside of the [address] [place] [address] since your last visit? Include any pap smears or colon screening. No    3) Do you have an Advance Directive on file? no    4) Are you interested in receiving information on Advance Directives? NO      Patient is accompanied by self I have received verbal consent from Azeb Ayers to discuss any/all medical information while they are present in the room. Azeb Ayers is a 80 y.o. female  who presents for routine immunization(s). Patient denies any symptoms , reactions or allergies that would exclude them from being immunized today. Risks and adverse reactions were discussed and the VIS was given to them. Patient voiced full understanding and signed Adult Immunization Consent form. All questions were addressed. Patient was observed for 10 min post injection. There were no reactions observed.     [facility]

## 2019-10-03 NOTE — PROGRESS NOTES
HISTORY OF PRESENT ILLNESS  Ave Sanchez is a 80 y.o. female. Pt. comes in for f/u. Has multiple medical problems. Poor historian. Has chronic arthritic pains. No falls. Reports compliance with medications and diet. Med list and most recent labs/studies reviewed with pt. Trying to be active physically to control weight. Needs med refills. Reports no other new c/o. HPI    Review of Systems   Constitutional: Negative. HENT: Positive for hearing loss. Eyes: Negative. Respiratory: Negative. Negative for shortness of breath. Cardiovascular: Negative for chest pain and leg swelling. Gastrointestinal: Negative. Negative for abdominal pain. Genitourinary: Negative. Musculoskeletal: Positive for joint pain. Negative for falls. Skin: Negative. Neurological: Negative. Endo/Heme/Allergies: Negative. Psychiatric/Behavioral: Positive for memory loss. Negative for depression. The patient is not nervous/anxious and does not have insomnia. Physical Exam   Constitutional: She is oriented to person, place, and time. She appears well-developed and well-nourished. No distress. HENT:   Head: Normocephalic and atraumatic. Mouth/Throat: Oropharynx is clear and moist.   Eyes: Conjunctivae are normal.   Neck: Normal range of motion. Neck supple. No JVD present. No thyromegaly present. Cardiovascular: Normal rate, regular rhythm, normal heart sounds and intact distal pulses. No murmur heard. Pulmonary/Chest: Effort normal and breath sounds normal. No respiratory distress. She has no wheezes. She has no rales. Abdominal: Soft. Bowel sounds are normal. She exhibits no distension. Musculoskeletal: She exhibits tenderness (L knee with crepitus). She exhibits no edema. DJD     Neurological: She is alert and oriented to person, place, and time. Coordination abnormal.   Mild memory issues   Skin: Skin is warm and dry. No rash noted. Psychiatric: She has a normal mood and affect.  Her behavior is normal.   Nursing note and vitals reviewed. ASSESSMENT and PLAN  Diagnoses and all orders for this visit:    1. Chronic pain of left knee    2. Encounter for immunization  -     INFLUENZA VACCINE INACTIVATED (IIV), SUBUNIT, ADJUVANTED, IM  -     ADMIN INFLUENZA VIRUS VAC    3. Primary osteoarthritis of both knees    4. Essential hypertension    5. Gait difficulty    6. Memory impairment    Other orders  -     diclofenac (VOLTAREN) 1 % gel; Apply to L knee QID x 7 days then QID prn      Follow-up and Dispositions    · Return in about 4 months (around 2/3/2020).      lab results and schedule of future lab studies reviewed with patient  reviewed diet, exercise and weight control  reviewed medications and side effects in detail  F/u with other MD's as scheduled  Fall precautions discussed  Note sent to AL

## 2019-10-03 NOTE — PATIENT INSTRUCTIONS
Vaccine Information Statement Influenza (Flu) Vaccine (Inactivated or Recombinant): What You Need to Know Many Vaccine Information Statements are available in Welsh and other languages. See www.immunize.org/vis Hojas de información sobre vacunas están disponibles en español y en muchos otros idiomas. Visite www.immunize.org/vis 1. Why get vaccinated? Influenza vaccine can prevent influenza (flu). Flu is a contagious disease that spreads around the [facility] every year, usually between October and May. Anyone can get the flu, but it is more dangerous for some people. Infants and young children, people 72years of age and older, pregnant women, and people with certain health conditions or a weakened immune system are at greatest risk of flu complications. Pneumonia, bronchitis, sinus infections and ear infections are examples of flu-related complications. If you have a medical condition, such as heart disease, cancer or diabetes, flu can make it worse. Flu can cause fever and chills, sore throat, muscle aches, fatigue, cough, headache, and runny or stuffy nose. Some people may have vomiting and diarrhea, though this is more common in children than adults. Each year thousands of people in the [facility] die from flu, and many more are hospitalized. Flu vaccine prevents millions of illnesses and flu-related visits to the doctor each year. 2. Influenza vaccines CDC recommends everyone 10months of age and older get vaccinated every flu season. Children 6 months through 6years of age may need 2 doses during a single flu season. Everyone else needs only 1 dose each flu season. It takes about 2 weeks for protection to develop after vaccination. There are many flu viruses, and they are always changing. Each year a new flu vaccine is made to protect against three or four viruses that are likely to cause disease in the upcoming flu season.  Even when the vaccine doesnt exactly match these viruses, it may still provide some protection. Influenza vaccine does not cause flu. Influenza vaccine may be given at the same time as other vaccines. 3. Talk with your health care provider Tell your vaccine provider if the person getting the vaccine: 
 Has had an allergic reaction after a previous dose of influenza vaccine, or has any severe, life-threatening allergies.  Has ever had Guillain-Barré Syndrome (also called GBS). In some cases, your health care provider may decide to postpone influenza vaccination to a future visit. People with minor illnesses, such as a cold, may be vaccinated. People who are moderately or severely ill should usually wait until they recover before getting influenza vaccine. Your health care provider can give you more information. 4. Risks of a reaction  Soreness, redness, and swelling where shot is given, fever, muscle aches, and headache can happen after influenza vaccine.  There may be a very small increased risk of Guillain-Barré Syndrome (GBS) after inactivated influenza vaccine (the flu shot). [facility] children who get the flu shot along with pneumococcal vaccine (PCV13), and/or DTaP vaccine at the same time might be slightly more likely to have a seizure caused by fever. Tell your health care provider if a child who is getting flu vaccine has ever had a seizure. People sometimes faint after medical procedures, including vaccination. Tell your provider if you feel dizzy or have vision changes or ringing in the ears. As with any medicine, there is a very remote chance of a vaccine causing a severe allergic reaction, other serious injury, or death. 5. What if there is a serious problem? An allergic reaction could occur after the vaccinated person leaves the clinic.  If you see signs of a severe allergic reaction (hives, swelling of the face and throat, difficulty breathing, a fast heartbeat, dizziness, or weakness), call 9-1-1 and get the person to the nearest hospital. 
 
For other signs that concern you, call your health care provider. Adverse reactions should be reported to the Vaccine Adverse Event Reporting System (VAERS). Your health care provider will usually file this report, or you can do it yourself. Visit the VAERS website at www.vaers. hhs.gov or call 5-741.890.9414. VAERS is only for reporting reactions, and VAERS staff do not give medical advice. 6. The National Vaccine Injury Compensation Program 
 
The [facility] Vaccine Injury Compensation Program (VICP) is a federal program that was created to compensate people who may have been injured by certain vaccines. Visit the VICP website at www.hrsa.gov/vaccinecompensation or call 4-332.425.8969 to learn about the program and about filing a claim. There is a time limit to file a claim for compensation. 7. How can I learn more?  Ask your health care provider.  Call your local or state health department.  Contact the Centers for Disease Control and Prevention (CDC): 
- Call 6-852.379.8042 (1-800-CDC-INFO) or 
- Visit CDCs influenza website at www.cdc.gov/flu Vaccine Information Statement (Interim) Inactivated Influenza Vaccine 8/15/2019 
42 EVELYN Burrell [id number] Department of Health and [employer] Centers for Disease Control and Prevention Office Use Only

## 2019-10-08 RX ORDER — DICLOFENAC SODIUM 10 MG/G
GEL TOPICAL
Qty: 100 G | Refills: 5 | Status: SHIPPED | OUTPATIENT
Start: 2019-10-08

## 2020-01-14 DIAGNOSIS — E55.9 VITAMIN D DEFICIENCY: Primary | ICD-10-CM

## 2020-01-14 RX ORDER — MELATONIN
1000 DAILY
Qty: 90 TAB | Refills: 3 | Status: SHIPPED | OUTPATIENT
Start: 2020-01-14 | End: 2020-01-20 | Stop reason: SDUPTHER

## 2020-01-20 DIAGNOSIS — E55.9 VITAMIN D DEFICIENCY: Primary | ICD-10-CM

## 2020-01-20 RX ORDER — MELATONIN
1000 DAILY
Qty: 90 TAB | Refills: 3 | Status: SHIPPED | OUTPATIENT
Start: 2020-01-20 | End: 2021-01-05 | Stop reason: SDUPTHER

## 2020-01-20 RX ORDER — MELATONIN
1000 DAILY
Qty: 90 TAB | Refills: 3 | Status: SHIPPED | OUTPATIENT
Start: 2020-01-20 | End: 2020-01-20 | Stop reason: DRUGHIGH

## 2020-01-30 ENCOUNTER — OFFICE VISIT (OUTPATIENT)
Dept: INTERNAL MEDICINE CLINIC | Age: 85
End: 2020-01-30

## 2020-01-30 VITALS
SYSTOLIC BLOOD PRESSURE: 148 MMHG | HEART RATE: 83 BPM | HEIGHT: 62 IN | WEIGHT: 130.6 LBS | RESPIRATION RATE: 18 BRPM | OXYGEN SATURATION: 92 % | DIASTOLIC BLOOD PRESSURE: 70 MMHG | TEMPERATURE: 98.2 F | BODY MASS INDEX: 24.03 KG/M2

## 2020-01-30 DIAGNOSIS — R41.3 MEMORY IMPAIRMENT: ICD-10-CM

## 2020-01-30 DIAGNOSIS — R26.9 GAIT DIFFICULTY: ICD-10-CM

## 2020-01-30 DIAGNOSIS — M17.0 PRIMARY OSTEOARTHRITIS OF BOTH KNEES: ICD-10-CM

## 2020-01-30 DIAGNOSIS — R20.0 BILATERAL FINGER NUMBNESS: Primary | ICD-10-CM

## 2020-01-30 DIAGNOSIS — Z00.00 MEDICARE ANNUAL WELLNESS VISIT, SUBSEQUENT: ICD-10-CM

## 2020-01-30 DIAGNOSIS — I10 ESSENTIAL HYPERTENSION: ICD-10-CM

## 2020-01-30 RX ORDER — GABAPENTIN 100 MG/1
100 CAPSULE ORAL
Qty: 30 CAP | Refills: 0 | Status: SHIPPED | OUTPATIENT
Start: 2020-01-30 | End: 2020-02-13 | Stop reason: SDUPTHER

## 2020-01-30 NOTE — PROGRESS NOTES
Schedule of Personalized Health Plan  (Provide Copy to Patient)  The best way to stay healthy is to live a healthy lifestyle. A healthy lifestyle includes regular exercise, eating a well-balanced diet, keeping a healthy weight and not smoking. Regular physical exams and screening tests are another important way to take care of yourself. Preventive exams provided by health care providers can find health problems early when treatment works best and can keep you from getting certain diseases or illnesses. Preventive services include exams, lab tests, screenings, shots, monitoring and information to help you take care of your own health. All people over 65 should have a pneumonia shot. Pneumonia shots are usually only needed once in a lifetime unless your doctor decides differently. All people over 65 should have a yearly flu shot. People over 65 are at medium to high risk for Hepatitis B. Three shots are needed for complete protection. In addition to your physical exam, some screening tests are recommended:    Bone mass measurement (dexa scan) is recommended every two years  Diabetes Mellitus screening is recommended every year. Glaucoma is an eye disease caused by high pressure in the eye. An eye exam is recommended every year. Cardiovascular screening tests that check your cholesterol and other blood fat (lipid) levels are recommended every five years. Colorectal Cancer screening tests help to find pre-cancerous polyps (growths in the colon) so they can be removed before they turn into cancer. Tests ordered for screening depend on your personal and family history risk factors.     Screening for Breast Cancer is recommended yearly with a mammogram.    Screening for Cervical Cancer is recommended every two years (annually for certain risk factors, such as previous history of STD or abnormal PAP in past 7 years), with a Pelvic Exam with PAP    Here is a list of your current Health Maintenance items with a due date:  Health Maintenance   Topic Date Due    DTaP/Tdap/Td series (1 - Tdap) 08/15/1940    Shingrix Vaccine Age 50> (1 of 2) 08/15/1979    GLAUCOMA SCREENING Q2Y  06/06/2019    Pneumococcal 65+ years (2 of 2 - PPSV23) 11/15/2019    MEDICARE YEARLY EXAM  01/30/2021    Influenza Age 5 to Adult  Completed

## 2020-01-30 NOTE — PROGRESS NOTES
Health Maintenance Due   Topic Date Due    DTaP/Tdap/Td series (1 - Tdap) 08/15/1940    Shingrix Vaccine Age 50> (1 of 2) 08/15/1979    GLAUCOMA SCREENING Q2Y  06/06/2019    Pneumococcal 65+ years (2 of 2 - PPSV23) 11/15/2019    MEDICARE YEARLY EXAM  11/16/2019       Chief Complaint   Patient presents with    Hypertension     4 month f/u    Osteoporosis    Annual Wellness Visit    Tingling     In finger tips       1. Have you been to the ER, urgent care clinic since your last visit? Hospitalized since your last visit? No    2. Have you seen or consulted any other health care providers outside of the [address] [place] [address] since your last visit? Include any pap smears or colon screening. No    3) Do you have an Advance Directive on file? yes    4) Are you interested in receiving information on Advance Directives? NO      Patient is accompanied by self I have received verbal consent from Memo Foss to discuss any/all medical information while they are present in the room.

## 2020-01-31 NOTE — PROGRESS NOTES
HISTORY OF PRESENT ILLNESS  Ze York is a 80 y.o. female. Pt. comes in from assisted living for f/u. Has a few chronic medical issues as documented. Has some memory impairment but answers most questions properly. Reports chronic numbness at the tip of fingers especially left side. Denies neck pain or other focal neurological issues. Reports sleeping okay. BP is stable. Has chronic arthritic pains especially knees. Gait is slow but no recent falls. Has had previous pelvic fractures from falls. PMH/PSH/Allergies/Social History/medication list and most recent studies reviewed with patient. Tobacco use: No  Alcohol use: No    Reports compliance with medications and diet. Trying to be active physically as tolerated. Reports no other new c/o. HPI    Review of Systems   Constitutional: Negative. HENT: Positive for hearing loss. Eyes: Negative. Respiratory: Negative. Negative for shortness of breath. Cardiovascular: Negative for chest pain and leg swelling. Gastrointestinal: Negative. Negative for abdominal pain. Genitourinary: Negative. Musculoskeletal: Positive for joint pain. Negative for falls. Skin: Negative. Neurological: Positive for sensory change. Negative for dizziness, focal weakness and headaches. Endo/Heme/Allergies: Negative. Psychiatric/Behavioral: Positive for memory loss. Negative for depression. The patient is not nervous/anxious and does not have insomnia. Physical Exam  Vitals signs and nursing note reviewed. Constitutional:       General: She is not in acute distress. Appearance: She is well-developed. HENT:      Head: Normocephalic and atraumatic. Mouth/Throat:      Mouth: Mucous membranes are moist.   Eyes:      Conjunctiva/sclera: Conjunctivae normal.   Neck:      Musculoskeletal: Normal range of motion and neck supple. Thyroid: No thyromegaly. Vascular: No JVD.    Cardiovascular:      Rate and Rhythm: Normal rate and regular rhythm. Heart sounds: Normal heart sounds. No murmur. Pulmonary:      Effort: Pulmonary effort is normal. No respiratory distress. Breath sounds: Normal breath sounds. No wheezing or rales. Abdominal:      General: Bowel sounds are normal. There is no distension. Palpations: Abdomen is soft. Musculoskeletal:         General: Tenderness (L knee with crepitus) present. Comments: DJD     Skin:     General: Skin is warm and dry. Findings: No rash. Neurological:      Mental Status: She is alert and oriented to person, place, and time. Sensory: Sensory deficit present. Coordination: Coordination abnormal.      Gait: Gait abnormal.      Comments: Mild memory issues   Psychiatric:         Behavior: Behavior normal.         ASSESSMENT and PLAN  Diagnoses and all orders for this visit:    1. Bilateral finger numbness  -     Trial of gabapentin (NEURONTIN) 100 mg capsule; Take 1 Cap by mouth nightly. Max Daily Amount: 100 mg.    2. Essential hypertension    3. Primary osteoarthritis of both knees    4. Memory impairment    5. Gait difficulty    6. Medicare annual wellness visit, subsequent      Follow-up and Dispositions    · Return in about 2 weeks (around 2/13/2020).      lab results and schedule of future lab studies reviewed with patient  reviewed diet, exercise and weight control  reviewed medications and side effects in detail  Discussed possible etiologies of her finger numbness including neuropathy  Benefits and side effects of gabapentin discussed  Fall precautions discussed  Note sent to AL

## 2020-02-13 ENCOUNTER — OFFICE VISIT (OUTPATIENT)
Dept: INTERNAL MEDICINE CLINIC | Age: 85
End: 2020-02-13

## 2020-02-13 VITALS
DIASTOLIC BLOOD PRESSURE: 76 MMHG | HEART RATE: 82 BPM | TEMPERATURE: 98 F | BODY MASS INDEX: 24.01 KG/M2 | SYSTOLIC BLOOD PRESSURE: 140 MMHG | OXYGEN SATURATION: 93 % | RESPIRATION RATE: 18 BRPM | WEIGHT: 130.5 LBS | HEIGHT: 62 IN

## 2020-02-13 DIAGNOSIS — R20.0 BILATERAL FINGER NUMBNESS: Primary | ICD-10-CM

## 2020-02-13 DIAGNOSIS — R41.3 MEMORY IMPAIRMENT: ICD-10-CM

## 2020-02-13 DIAGNOSIS — R26.9 GAIT DIFFICULTY: ICD-10-CM

## 2020-02-13 DIAGNOSIS — I10 ESSENTIAL HYPERTENSION: ICD-10-CM

## 2020-02-13 RX ORDER — GABAPENTIN 100 MG/1
100 CAPSULE ORAL
Qty: 30 CAP | Refills: 5 | Status: SHIPPED | OUTPATIENT
Start: 2020-02-13 | End: 2020-06-11 | Stop reason: SDUPTHER

## 2020-02-13 NOTE — PROGRESS NOTES
Health Maintenance Due   Topic Date Due    DTaP/Tdap/Td series (1 - Tdap) 08/15/1940    Shingrix Vaccine Age 50> (1 of 2) 08/15/1979    GLAUCOMA SCREENING Q2Y  06/06/2019    Pneumococcal 65+ years (2 of 2 - PPSV23) 11/15/2019       Chief Complaint   Patient presents with    Hypertension     2 wk f/u    Osteoporosis    Memory Loss       1. Have you been to the ER, urgent care clinic since your last visit? Hospitalized since your last visit? No    2. Have you seen or consulted any other health care providers outside of the [address] [place] [address] since your last visit? Include any pap smears or colon screening. No    3) Do you have an Advance Directive on file? yes    4) Are you interested in receiving information on Advance Directives? NO      Patient is accompanied by self I have received verbal consent from Leamon Blizzard to discuss any/all medical information while they are present in the room.

## 2020-02-17 NOTE — PROGRESS NOTES
HISTORY OF PRESENT ILLNESS  Peewee Izaguirre is a 80 y.o. female. Pt. comes in from assisted living for f/u. Has a few chronic medical issues as documented. Her chronic fingertip numbness has improved with gabapentin 100 mg nightly. Denies any significant side effects. BP and other vital signs are stable. Gait is unsteady but no falls. Memory is poor but stable. Has chronic arthritic pains. PMH/PSH/Allergies/Social History/medication list and most recent studies reviewed with patient. Tobacco use: No  Alcohol use: No  Reports compliance with medications and diet. Trying to be active physically as tolerated. Reports no other new c/o. HPI    Review of Systems   Constitutional: Negative. HENT: Positive for hearing loss. Eyes: Negative. Respiratory: Negative. Negative for shortness of breath. Cardiovascular: Negative for chest pain and leg swelling. Gastrointestinal: Negative. Negative for abdominal pain. Genitourinary: Negative. Musculoskeletal: Positive for joint pain. Negative for falls. Skin: Negative. Neurological: Positive for sensory change. Negative for dizziness, focal weakness and headaches. Endo/Heme/Allergies: Negative. Psychiatric/Behavioral: Positive for memory loss. Negative for depression. The patient is not nervous/anxious and does not have insomnia. Physical Exam  Vitals signs and nursing note reviewed. Constitutional:       General: She is not in acute distress. Appearance: She is well-developed. HENT:      Head: Normocephalic and atraumatic. Mouth/Throat:      Mouth: Mucous membranes are moist.   Eyes:      Conjunctiva/sclera: Conjunctivae normal.   Neck:      Musculoskeletal: Normal range of motion and neck supple. Thyroid: No thyromegaly. Vascular: No JVD. Cardiovascular:      Rate and Rhythm: Normal rate and regular rhythm. Heart sounds: Normal heart sounds. No murmur.    Pulmonary:      Effort: Pulmonary effort is normal. No respiratory distress. Breath sounds: Normal breath sounds. Abdominal:      General: Bowel sounds are normal. There is no distension. Palpations: Abdomen is soft. Musculoskeletal:         General: Tenderness (L knee with crepitus) present. Comments: DJD     Skin:     General: Skin is warm and dry. Findings: No rash. Neurological:      General: No focal deficit present. Mental Status: She is alert and oriented to person, place, and time. Coordination: Coordination abnormal.      Gait: Gait abnormal.      Comments: Mild memory issues   Psychiatric:         Behavior: Behavior normal.         ASSESSMENT and PLAN  Diagnoses and all orders for this visit:    1. Bilateral finger numbness  -     gabapentin (NEURONTIN) 100 mg capsule; Take 1 Cap by mouth nightly. Max Daily Amount: 100 mg.    2. Essential hypertension    3. Memory impairment    4. Gait difficulty      Follow-up and Dispositions    · Return in about 4 months (around 6/13/2020).      lab results and schedule of future lab studies reviewed with patient  reviewed diet, exercise and weight control  reviewed medications and side effects in detail  Fall precautions discussed  Reassurance  Overall stable  Note sent to assisted living with patient

## 2020-03-12 ENCOUNTER — OFFICE VISIT (OUTPATIENT)
Dept: INTERNAL MEDICINE CLINIC | Age: 85
End: 2020-03-12

## 2020-03-12 ENCOUNTER — HOSPITAL ENCOUNTER (OUTPATIENT)
Dept: LAB | Age: 85
Discharge: HOME | End: 2020-03-12
Payer: MEDICARE

## 2020-03-12 VITALS
HEART RATE: 74 BPM | DIASTOLIC BLOOD PRESSURE: 66 MMHG | OXYGEN SATURATION: 99 % | RESPIRATION RATE: 18 BRPM | WEIGHT: 132.2 LBS | TEMPERATURE: 98.3 F | SYSTOLIC BLOOD PRESSURE: 136 MMHG | BODY MASS INDEX: 24.33 KG/M2 | HEIGHT: 62 IN

## 2020-03-12 DIAGNOSIS — R26.9 GAIT DIFFICULTY: ICD-10-CM

## 2020-03-12 DIAGNOSIS — R41.3 MEMORY IMPAIRMENT: ICD-10-CM

## 2020-03-12 DIAGNOSIS — Z23 ENCOUNTER FOR IMMUNIZATION: ICD-10-CM

## 2020-03-12 DIAGNOSIS — R41.0 CONFUSION: ICD-10-CM

## 2020-03-12 DIAGNOSIS — F32.9 REACTIVE DEPRESSION: Primary | ICD-10-CM

## 2020-03-12 DIAGNOSIS — I10 ESSENTIAL HYPERTENSION: ICD-10-CM

## 2020-03-12 LAB
BILIRUB UR QL STRIP: NEGATIVE
GLUCOSE UR-MCNC: NEGATIVE MG/DL
KETONES P FAST UR STRIP-MCNC: NEGATIVE MG/DL
PH UR STRIP: 5 [PH] (ref 4.6–8)
PROT UR QL STRIP: NEGATIVE
SP GR UR STRIP: 1.01 (ref 1–1.03)
UA UROBILINOGEN AMB POC: NORMAL (ref 0.2–1)
URINALYSIS CLARITY POC: CLEAR
URINALYSIS COLOR POC: YELLOW
URINE BLOOD POC: NEGATIVE
URINE LEUKOCYTES POC: NEGATIVE
URINE NITRITES POC: POSITIVE

## 2020-03-12 PROCEDURE — 87088 URINE BACTERIA CULTURE: CPT

## 2020-03-12 PROCEDURE — 87186 SC STD MICRODIL/AGAR DIL: CPT

## 2020-03-12 PROCEDURE — 87086 URINE CULTURE/COLONY COUNT: CPT

## 2020-03-12 RX ORDER — SERTRALINE HYDROCHLORIDE 25 MG/1
25 TABLET, FILM COATED ORAL DAILY
Qty: 30 TAB | Refills: 5 | Status: SHIPPED | OUTPATIENT
Start: 2020-03-12 | End: 2020-09-15 | Stop reason: SDUPTHER

## 2020-03-12 NOTE — PROGRESS NOTES
HISTORY OF PRESENT ILLNESS  Leamon Blizzard is a 80 y.o. female. Pt. comes in from assisted living for f/u. Has a few chronic medical issues as documented. Staff report patient having increased confusion. They are concerned about a possible UTI. She seems to be close to her baseline when talking to me. Denies any urinary symptoms. She does have some memory impairment. Tells me she feels depressed and lonely. Talks about her  who  a few years ago. Tells me how much she misses him. Has a daughter who lives in another state. They talk over the phone but has not seen her in a while. Gait is slow and unsteady but no recent falls. Has some chronic arthritic pain. PMH/PSH/Allergies/Social History/medication list and most recent studies reviewed with patient. Tobacco use: No  Alcohol use: No l    Reports compliance with medications and diet. Trying to be active physically as tolerated. Reports no other new c/o. HPI    Review of Systems   Constitutional: Negative. HENT: Positive for hearing loss. Eyes: Negative. Respiratory: Negative. Negative for shortness of breath. Cardiovascular: Negative for chest pain and leg swelling. Gastrointestinal: Negative. Negative for abdominal pain. Genitourinary: Negative. Negative for dysuria, flank pain and hematuria. Musculoskeletal: Positive for joint pain. Negative for falls. Skin: Negative. Neurological: Positive for sensory change. Negative for dizziness, focal weakness and headaches. Endo/Heme/Allergies: Negative. Psychiatric/Behavioral: Positive for depression and memory loss. Negative for hallucinations, substance abuse and suicidal ideas. The patient is not nervous/anxious and does not have insomnia. Physical Exam  Vitals signs and nursing note reviewed. Constitutional:       General: She is not in acute distress. Appearance: She is well-developed. HENT:      Head: Normocephalic and atraumatic. Mouth/Throat:      Mouth: Mucous membranes are moist.   Eyes:      Conjunctiva/sclera: Conjunctivae normal.   Neck:      Musculoskeletal: Normal range of motion and neck supple. Thyroid: No thyromegaly. Vascular: No JVD. Cardiovascular:      Rate and Rhythm: Normal rate and regular rhythm. Heart sounds: Normal heart sounds. No murmur. Pulmonary:      Effort: Pulmonary effort is normal. No respiratory distress. Breath sounds: Normal breath sounds. No wheezing or rales. Abdominal:      General: Bowel sounds are normal. There is no distension. Palpations: Abdomen is soft. Tenderness: There is no abdominal tenderness. There is no right CVA tenderness or left CVA tenderness. Musculoskeletal:         General: Tenderness (L knee with crepitus) present. Comments: DJD     Skin:     General: Skin is warm and dry. Findings: No rash. Neurological:      General: No focal deficit present. Mental Status: She is alert and oriented to person, place, and time. Coordination: Coordination abnormal.      Gait: Gait abnormal.      Comments: Mild memory issues   Psychiatric:         Behavior: Behavior normal.      Comments: Seems depressed          ASSESSMENT and PLAN  Diagnoses and all orders for this visit:    1. Reactive depression    2. Confusion  -     AMB POC URINALYSIS DIP STICK MANUAL W/O MICRO    3. Essential hypertension    4. Memory impairment    5. Gait difficulty    6. Encounter for immunization  -     PNEUMOCOCCAL POLYSACCHARIDE VACCINE, 23-VALENT, ADULT OR IMMUNOSUPPRESSED PT DOSE,    Other orders  -     sertraline (ZOLOFT) 25 mg tablet; Take 1 Tab by mouth daily. Follow-up and Dispositions    · Return in about 4 weeks (around 4/9/2020).      lab results and schedule of future lab studies reviewed with patient  reviewed diet, exercise and weight control  reviewed medications and side effects in detail  Discussed diagnosis etiology and treatment of depression in detail with patient  Pt was unable to give was a urine sample. Will try to bring a sample in later.

## 2020-03-12 NOTE — PROGRESS NOTES
Health Maintenance Due   Topic Date Due    DTaP/Tdap/Td series (1 - Tdap) 08/15/1950    Shingrix Vaccine Age 50> (1 of 2) 08/15/1979    GLAUCOMA SCREENING Q2Y  06/06/2019    Pneumococcal 65+ years (2 of 2 - PPSV23) 11/15/2019       Chief Complaint   Patient presents with    Altered mental status       1. Have you been to the ER, urgent care clinic since your last visit? Hospitalized since your last visit? No    2. Have you seen or consulted any other health care providers outside of the [address] [place] [address] since your last visit? Include any pap smears or colon screening. No    3) Do you have an Advance Directive on file? no    4) Are you interested in receiving information on Advance Directives? NO      Patient is accompanied by self I have received verbal consent from Gera Vasquez to discuss any/all medical information while they are present in the room.

## 2020-03-17 LAB — BACTERIA UR CULT: ABNORMAL

## 2020-03-17 RX ORDER — SULFAMETHOXAZOLE AND TRIMETHOPRIM 400; 80 MG/1; MG/1
1 TABLET ORAL 2 TIMES DAILY
Qty: 14 TAB | Refills: 0 | Status: SHIPPED | OUTPATIENT
Start: 2020-03-17 | End: 2020-03-24

## 2020-04-02 ENCOUNTER — TELEPHONE (OUTPATIENT)
Dept: INTERNAL MEDICINE CLINIC | Age: 85
End: 2020-04-02

## 2020-04-06 NOTE — TELEPHONE ENCOUNTER
Returned [employer] and after verifying HIPAA she became upset wanting to speak to Jeromy Plaza NP. Advised her that Kim Esparza is not in the office at present asked what I can do for her. She was upset and raising her voice because the pt was prescribed an antidepressant and she was not made aware of it. Advised her that writer was not with Dr. Jennifer Davis that day, but that there was an antidepressant prescribed in the chart and writer began reviewing the note. Ibeth Blanco stated,\"First of all, you need to lose the attitude right now\". Writer stated,\"I do not have an attitude, I was calling you back and you began raising your voice at me\". Ibeth Blanco stated,\"You do have an attitude\". Writer stated,\"I called to attempt to help you. If you would like to continue this conversation politely I'd be glad to do so, but I will not sit here and be yelled at\". The call was disconnected.

## 2020-04-09 ENCOUNTER — OFFICE VISIT (OUTPATIENT)
Dept: INTERNAL MEDICINE CLINIC | Age: 85
End: 2020-04-09

## 2020-04-09 VITALS
RESPIRATION RATE: 18 BRPM | DIASTOLIC BLOOD PRESSURE: 62 MMHG | HEIGHT: 62 IN | OXYGEN SATURATION: 99 % | BODY MASS INDEX: 23.81 KG/M2 | TEMPERATURE: 99.6 F | SYSTOLIC BLOOD PRESSURE: 130 MMHG | WEIGHT: 129.4 LBS | HEART RATE: 80 BPM

## 2020-04-09 DIAGNOSIS — M17.0 PRIMARY OSTEOARTHRITIS OF BOTH KNEES: ICD-10-CM

## 2020-04-09 DIAGNOSIS — R26.9 GAIT DIFFICULTY: ICD-10-CM

## 2020-04-09 DIAGNOSIS — F32.9 REACTIVE DEPRESSION: ICD-10-CM

## 2020-04-09 DIAGNOSIS — R41.3 MEMORY IMPAIRMENT: ICD-10-CM

## 2020-04-09 DIAGNOSIS — I10 ESSENTIAL HYPERTENSION: Primary | ICD-10-CM

## 2020-04-09 NOTE — PROGRESS NOTES
Health Maintenance Due   Topic Date Due    DTaP/Tdap/Td series (1 - Tdap) 08/15/1950    Shingrix Vaccine Age 50> (1 of 2) 08/15/1979    GLAUCOMA SCREENING Q2Y  06/06/2019    Pneumococcal 65+ years (2 of 2 - PPSV23) 11/15/2019       Chief Complaint   Patient presents with    Hypertension    Osteoporosis    Altered mental status       1. Have you been to the ER, urgent care clinic since your last visit? Hospitalized since your last visit? No    2. Have you seen or consulted any other health care providers outside of the [address] [place] [address] since your last visit? Include any pap smears or colon screening. No    3) Do you have an Advance Directive on file? no    4) Are you interested in receiving information on Advance Directives? NO      Patient is accompanied by self I have received verbal consent from Collin Ruiz to discuss any/all medical information while they are present in the room.

## 2020-04-21 NOTE — PROGRESS NOTES
HISTORY OF PRESENT ILLNESS  Neno Mays is a 80 y.o. female. She is here from assisted living for follow-up. She is a poor historian with dementia. According to staff has had more confusion. Tells me that she is feeling depressed. She has been confined to her room because of coronavirus pandemic. I started Zoloft recently for depression. Denies any side effects. Med list and most recent studies reviewed. Continues to have chronic arthritic pains. Gait is unsteady but no recent falls. Tells me she is eating well. Her daughter lives in another state and has been trying to convince patient to go live close by. Apparently patient does not want to leave. Denies any other acute issues today. HPI    Review of Systems   Constitutional: Negative. HENT: Positive for hearing loss. Eyes: Negative. Respiratory: Negative. Negative for shortness of breath. Cardiovascular: Negative for chest pain and leg swelling. Gastrointestinal: Negative. Negative for abdominal pain. Genitourinary: Negative. Negative for dysuria, flank pain and hematuria. Musculoskeletal: Positive for joint pain. Negative for falls. Skin: Negative. Neurological: Positive for sensory change. Negative for dizziness, focal weakness and headaches. Endo/Heme/Allergies: Negative. Psychiatric/Behavioral: Positive for depression and memory loss. Negative for hallucinations, substance abuse and suicidal ideas. The patient is not nervous/anxious and does not have insomnia. Physical Exam  Vitals signs and nursing note reviewed. Constitutional:       General: She is not in acute distress. Appearance: She is well-developed. HENT:      Head: Normocephalic and atraumatic. Mouth/Throat:      Mouth: Mucous membranes are moist.   Eyes:      Conjunctiva/sclera: Conjunctivae normal.   Neck:      Musculoskeletal: Normal range of motion and neck supple. Thyroid: No thyromegaly. Vascular: No JVD. Cardiovascular:      Rate and Rhythm: Normal rate and regular rhythm. Heart sounds: Normal heart sounds. No murmur. Pulmonary:      Effort: Pulmonary effort is normal. No respiratory distress. Breath sounds: Normal breath sounds. No wheezing or rales. Abdominal:      General: Bowel sounds are normal. There is no distension. Palpations: Abdomen is soft. Tenderness: There is no abdominal tenderness. There is no right CVA tenderness or left CVA tenderness. Musculoskeletal:         General: Tenderness (L knee with crepitus) present. Comments: DJD     Skin:     General: Skin is warm and dry. Findings: No rash. Neurological:      General: No focal deficit present. Mental Status: She is alert and oriented to person, place, and time. Coordination: Coordination abnormal.      Gait: Gait abnormal.      Comments: Mild memory issues   Psychiatric:         Behavior: Behavior normal.      Comments: Seems depressed          ASSESSMENT and PLAN  Diagnoses and all orders for this visit:    1. Essential hypertension    2. Memory impairment    3. Gait difficulty    4. Primary osteoarthritis of both knees    5. Reactive depression      Follow-up and Dispositions    · Return in about 4 months (around 8/9/2020).      lab results and schedule of future lab studies reviewed with patient  reviewed diet, exercise and weight control  reviewed medications and side effects in detail  Fall precautions discussed  Reassured patient that everything looks stable  I called patient's daughter and discussed my findings and plan with her  Note was sent to assisted living

## 2020-05-04 ENCOUNTER — TELEPHONE (OUTPATIENT)
Dept: INTERNAL MEDICINE CLINIC | Age: 85
End: 2020-05-04

## 2020-05-12 ENCOUNTER — OFFICE VISIT (OUTPATIENT)
Dept: INTERNAL MEDICINE CLINIC | Age: 85
End: 2020-05-12

## 2020-05-12 VITALS
RESPIRATION RATE: 15 BRPM | TEMPERATURE: 98.2 F | BODY MASS INDEX: 23.65 KG/M2 | HEIGHT: 62 IN | HEART RATE: 90 BPM | WEIGHT: 128.5 LBS | SYSTOLIC BLOOD PRESSURE: 122 MMHG | DIASTOLIC BLOOD PRESSURE: 74 MMHG | OXYGEN SATURATION: 96 %

## 2020-05-12 DIAGNOSIS — R41.3 MEMORY IMPAIRMENT: ICD-10-CM

## 2020-05-12 DIAGNOSIS — S09.90XA INJURY OF HEAD, INITIAL ENCOUNTER: ICD-10-CM
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
## 2020-05-12 DIAGNOSIS — I10 ESSENTIAL HYPERTENSION: ICD-10-CM

## 2020-05-12 NOTE — PROGRESS NOTES
HISTORY OF PRESENT ILLNESS  Everardo Clayton is a 80 y.o. female here after a fall. She slipped on the bathroom and fell down on the ground and hit her head on the right side of her forehead. She had some injury to her right knee and right-sided ribs. No shortness of breath mild pain on right knee. She is able to walk with a walker. No dizziness, no nausea vomiting or blurred vision. Has hypertension, stable blood pressure. Denies chest pain palpitation or shortness of breath. She is staying in an assisted living. Has mild memory impairment. Able to do ADL. Has DJD in multiple joints. On Tylenol as needed. HPI    Review of Systems   Constitutional: Negative. HENT: Negative. Eyes: Negative. Respiratory: Negative. Cardiovascular: Negative. Gastrointestinal: Negative. Genitourinary: Negative. Musculoskeletal: Positive for falls. Skin: Negative. Neurological: Negative for dizziness, tremors, speech change, focal weakness, loss of consciousness and headaches. Psychiatric/Behavioral: Negative. Physical Exam  Constitutional:       Appearance: Normal appearance. She is obese. HENT:      Head:      Comments: Bruise and a small hematoma on right side of the forehead. Right Ear: Tympanic membrane and ear canal normal.      Left Ear: Tympanic membrane and ear canal normal.      Nose: Nose normal.      Mouth/Throat:      Mouth: Mucous membranes are moist.   Neck:      Musculoskeletal: Normal range of motion and neck supple. Cardiovascular:      Rate and Rhythm: Normal rate and regular rhythm. Pulses: Normal pulses. Heart sounds: Normal heart sounds. Pulmonary:      Effort: Pulmonary effort is normal.      Breath sounds: Normal breath sounds. Comments: No rib tenderness. Abdominal:      General: Abdomen is flat. Palpations: Abdomen is soft. Musculoskeletal:         General: Tenderness present. Comments: Right knee: Mild tenderness present.   Range of motion mildly restricted. Neurological:      General: No focal deficit present. Mental Status: She is alert and oriented to person, place, and time. Mental status is at baseline. Sensory: No sensory deficit. Coordination: Coordination normal.      Gait: Gait normal.   Psychiatric:         Mood and Affect: Mood normal.         Behavior: Behavior normal.         ASSESSMENT and PLAN  Diagnoses and all orders for this visit:    1. Fall, initial encounter    She accidentally fell on bathroom floor. Had injury to forehead. No neurological deficit. I have examined her body. Had no broken bones, no rib injury. Her right knee slightly tender, no bruise. She believes she slipped and fell on the floor. She is using walker to walk. She do not think she needs a home health nurse for gait and balance. 2. Injury of head, initial encounter  Normal neurological exam.  Will monitor. 3. Essential hypertension  Stable blood pressure. Amlodipine. Need lab work next month. 4. Memory impairment  Mild cognitive deficit. No full-blown dementia. She is in assisted living right now. Not on any medicine. Discussed expected course/resolution/complications of diagnosis in detail with patient. Medication risks/benefits/costs/interactions/alternatives discussed with patient. Discussed COVID-19 infection precaution with patient. Pt was given an after visit summary which includes diagnoses, current medications & vitals. Pt expressed understanding with the diagnosis and plan.

## 2020-05-14 ENCOUNTER — TELEPHONE (OUTPATIENT)
Dept: INTERNAL MEDICINE CLINIC | Age: 85
End: 2020-05-14

## 2020-05-14 NOTE — TELEPHONE ENCOUNTER
Returned [employer] call and after verifying HIPAA discussed her visit with Dr. Anurag Medellin on Tuesday. She voiced thanks and understanding.

## 2020-06-11 ENCOUNTER — OFFICE VISIT (OUTPATIENT)
Dept: INTERNAL MEDICINE CLINIC | Age: 85
End: 2020-06-11

## 2020-06-11 VITALS
TEMPERATURE: 98.3 F | SYSTOLIC BLOOD PRESSURE: 146 MMHG | RESPIRATION RATE: 20 BRPM | OXYGEN SATURATION: 99 % | WEIGHT: 127 LBS | HEART RATE: 74 BPM | DIASTOLIC BLOOD PRESSURE: 76 MMHG | HEIGHT: 62 IN | BODY MASS INDEX: 23.37 KG/M2

## 2020-06-11 DIAGNOSIS — R41.3 MEMORY IMPAIRMENT: ICD-10-CM

## 2020-06-11 DIAGNOSIS — R20.0 BILATERAL FINGER NUMBNESS: ICD-10-CM

## 2020-06-11 DIAGNOSIS — I10 ESSENTIAL HYPERTENSION: Primary | ICD-10-CM

## 2020-06-11 DIAGNOSIS — R26.9 GAIT DIFFICULTY: ICD-10-CM

## 2020-06-11 DIAGNOSIS — F32.9 REACTIVE DEPRESSION: ICD-10-CM

## 2020-06-11 RX ORDER — GABAPENTIN 100 MG/1
100 CAPSULE ORAL 2 TIMES DAILY
Qty: 60 CAP | Refills: 5 | Status: SHIPPED | OUTPATIENT
Start: 2020-06-11 | End: 2020-12-17 | Stop reason: SDUPTHER

## 2020-06-11 RX ORDER — ARM BRACE
EACH MISCELLANEOUS
Qty: 1 EACH | Refills: 0 | Status: SHIPPED | OUTPATIENT
Start: 2020-06-11

## 2020-06-11 NOTE — PROGRESS NOTES
Health Maintenance Due   Topic Date Due    DTaP/Tdap/Td series (1 - Tdap) 08/15/1950    Shingrix Vaccine Age 50> (1 of 2) 08/15/1979    GLAUCOMA SCREENING Q2Y  06/06/2019    Pneumococcal 65+ years (2 of 2 - PPSV23) 11/15/2019       Chief Complaint   Patient presents with    Tingling     to fingertips on right hand    Medication Evaluation     family requests zoloft to be d/c        1. Have you been to the ER, urgent care clinic since your last visit? Hospitalized since your last visit? No    2. Have you seen or consulted any other health care providers outside of the [address] [place] [address] since your last visit? Include any pap smears or colon screening. No    3) Do you have an Advance Directive on file? no    4) Are you interested in receiving information on Advance Directives? NO      Patient is accompanied by self I have received verbal consent from Thomas Morrison to discuss any/all medical information while they are present in the room.

## 2020-06-24 NOTE — PROGRESS NOTES
HISTORY OF PRESENT ILLNESS  Hayley Lazo is a 80 y.o. female. Pt. comes in from assisted living for f/u. Has a few chronic medical issues as documented. She is a poor historian with dementia. Reports continued numbness and tingling of the tips of her fingers. I started her on low-dose gabapentin. She is not sure if it has helped. Denies any other focal neurological issues. Depends on staff for some ADLs. Gait is unsteady but no falls. Continues have issues with depression. Remains on low dose Zoloft. Her daughter lives in another state. Patient does not want to move from [place]. Denies any signs or symptoms of COVID-19. PMH/PSH/Allergies/Social History/medication list and most recent studies reviewed with patient. Tobacco use: No  Alcohol use: No  Reports compliance with medications and diet. Reports no other new c/o. HPI    Review of Systems   Constitutional: Negative. HENT: Positive for hearing loss. Eyes: Negative. Respiratory: Negative. Negative for shortness of breath. Cardiovascular: Negative for chest pain and leg swelling. Gastrointestinal: Negative. Negative for abdominal pain. Genitourinary: Negative. Negative for dysuria. Musculoskeletal: Positive for joint pain. Negative for falls. Skin: Negative. Neurological: Positive for tingling and sensory change. Negative for dizziness, focal weakness and headaches. Endo/Heme/Allergies: Negative. Psychiatric/Behavioral: Positive for depression and memory loss. The patient does not have insomnia. Physical Exam  Vitals signs and nursing note reviewed. Constitutional:       General: She is not in acute distress. Appearance: She is well-developed. HENT:      Head: Normocephalic and atraumatic. Mouth/Throat:      Mouth: Mucous membranes are moist.   Eyes:      Conjunctiva/sclera: Conjunctivae normal.   Neck:      Musculoskeletal: Normal range of motion and neck supple.       Thyroid: No thyromegaly. Vascular: No JVD. Cardiovascular:      Rate and Rhythm: Normal rate and regular rhythm. Heart sounds: Normal heart sounds. No murmur. Pulmonary:      Effort: Pulmonary effort is normal. No respiratory distress. Breath sounds: Normal breath sounds. No wheezing or rales. Abdominal:      General: Bowel sounds are normal. There is no distension. Palpations: Abdomen is soft. Tenderness: There is no abdominal tenderness. Musculoskeletal:         General: Tenderness (L knee with crepitus, chronic) present. Comments: DJD     Skin:     General: Skin is warm and dry. Findings: No erythema or rash. Neurological:      General: No focal deficit present. Mental Status: She is alert and oriented to person, place, and time. Motor: No weakness. Coordination: Coordination abnormal.      Gait: Gait abnormal.      Comments: Mild memory issues   Psychiatric:         Behavior: Behavior normal.      Comments: Chronically depressed          ASSESSMENT and PLAN  Diagnoses and all orders for this visit:    1. Essential hypertension    2. Memory impairment    3. Gait difficulty    4. Reactive depression    5. Bilateral finger numbness  -     gabapentin (NEURONTIN) 100 mg capsule; Take 1 Cap by mouth two (2) times a day. Max Daily Amount: 200 mg. Other orders  -     Arm Brace (Wrist Brace Medium) misc; Apply to L wrist daily ,  DX: L hand tingling      Follow-up and Dispositions    · Return in about 2 weeks (around 6/25/2020).      lab results and schedule of future lab studies reviewed with patient  reviewed diet, exercise and weight control  reviewed medications and side effects in detail  Fall precautions discussed  Explained to patient that she may need to see neurologist if symptoms do not get better  Both with my findings and plan sent to assisted living

## 2020-06-25 ENCOUNTER — OFFICE VISIT (OUTPATIENT)
Dept: INTERNAL MEDICINE CLINIC | Age: 85
End: 2020-06-25

## 2020-06-25 VITALS
OXYGEN SATURATION: 95 % | RESPIRATION RATE: 18 BRPM | TEMPERATURE: 98.5 F | DIASTOLIC BLOOD PRESSURE: 64 MMHG | SYSTOLIC BLOOD PRESSURE: 132 MMHG | HEIGHT: 62 IN | BODY MASS INDEX: 22.97 KG/M2 | HEART RATE: 96 BPM | WEIGHT: 124.8 LBS

## 2020-06-25 DIAGNOSIS — R20.2 PARESTHESIA OF FINGER: ICD-10-CM

## 2020-06-25 DIAGNOSIS — F32.9 REACTIVE DEPRESSION: ICD-10-CM

## 2020-06-25 DIAGNOSIS — R41.3 MEMORY IMPAIRMENT: ICD-10-CM

## 2020-06-25 DIAGNOSIS — R26.9 GAIT DIFFICULTY: ICD-10-CM

## 2020-06-25 DIAGNOSIS — I10 ESSENTIAL HYPERTENSION: Primary | ICD-10-CM

## 2020-06-25 NOTE — PROGRESS NOTES
ADVISED PATIENT OF THE FOLLOWING HEALTH MAINTAINCE DUE  Health Maintenance Due   Topic Date Due    DTaP/Tdap/Td series (1 - Tdap) 08/15/1950    Shingrix Vaccine Age 50> (1 of 2) 08/15/1979    GLAUCOMA SCREENING Q2Y  06/06/2019    Pneumococcal 65+ years (2 of 2 - PPSV23) 11/15/2019      Chief Complaint   Patient presents with    Depression     sad that her daughter is not living here in [place] anymore    Hypertension    Memory Loss       1. Have you been to the ER, urgent care clinic since your last visit? Hospitalized since your last visit? No    2. Have you seen or consulted any other health care providers outside of the [address] [place] [address] since your last visit? Include any DEXA scan, mammography  or colon screening. No    3. Do you have an Advance Directive on file? no    4. Do you have a DNR on file?  no    Patient is accompanied by self I have received verbal consent from [facility] to discuss any/all medical information while they are present in the room. No flowsheet data found. 1 [facility] [address]  Phone: [phone] Fax: [phone]    [facility] [place] [address]  Phone: [phone] Fax: [phone]        Patient reminded during visit to bring all medication bottles, OTC medications to all appointments.

## 2020-06-30 NOTE — PROGRESS NOTES
HISTORY OF PRESENT ILLNESS  Harpreet Culp is a 80 y.o. female. Is here from assisted living for follow-up. Has a few chronic medical issues. Poor historian with dementia. BP and other vital signs are stable. Reports continued tingling in tip of left fingers. Gabapentin at bedtime is helping some. Zoloft for depression which is helping some. Her daughter lives in another state but patient does not want to move. Gait is unsteady but no recent falls. Depends on staff for some ADLs. Taking precautions regarding COVID-19. Denies any signs or symptoms including fever, cough, dyspnea, chest pain. Med list and most recent studies reviewed. No other new complaints reported. HPI    Review of Systems   Constitutional: Negative. HENT: Positive for hearing loss. Eyes: Negative. Respiratory: Negative. Negative for shortness of breath. Cardiovascular: Negative for chest pain and leg swelling. Gastrointestinal: Negative. Negative for abdominal pain. Genitourinary: Negative. Negative for dysuria. Musculoskeletal: Positive for joint pain. Negative for falls. Skin: Negative. Neurological: Positive for tingling and sensory change. Negative for dizziness, focal weakness and headaches. Endo/Heme/Allergies: Negative. Psychiatric/Behavioral: Positive for depression and memory loss. The patient does not have insomnia. Physical Exam  Vitals signs and nursing note reviewed. Constitutional:       General: She is not in acute distress. Appearance: She is well-developed. HENT:      Head: Normocephalic and atraumatic. Mouth/Throat:      Mouth: Mucous membranes are moist.   Eyes:      Conjunctiva/sclera: Conjunctivae normal.   Neck:      Musculoskeletal: Normal range of motion and neck supple. Thyroid: No thyromegaly. Vascular: No JVD. Cardiovascular:      Rate and Rhythm: Normal rate and regular rhythm. Heart sounds: Normal heart sounds. No murmur.    Pulmonary: Effort: Pulmonary effort is normal. No respiratory distress. Breath sounds: Normal breath sounds. No wheezing or rales. Abdominal:      General: Bowel sounds are normal. There is no distension. Palpations: Abdomen is soft. Tenderness: There is no abdominal tenderness. Musculoskeletal:         General: Tenderness (L knee with crepitus, chronic) present. Comments: DJD     Skin:     General: Skin is warm and dry. Findings: No erythema or rash. Neurological:      General: No focal deficit present. Mental Status: She is alert and oriented to person, place, and time. Motor: No weakness. Coordination: Coordination abnormal.      Gait: Gait abnormal.      Comments: Poor memory, seems about the same as last time, answers most simple questions properly   Psychiatric:         Behavior: Behavior normal.      Comments: Chronically depressed          ASSESSMENT and PLAN  Diagnoses and all orders for this visit:    1. Essential hypertension    2. Gait difficulty    3. Memory impairment    4. Reactive depression    5. Paresthesia of finger      Follow-up and Dispositions    · Return in about 3 months (around 9/25/2020).      lab results and schedule of future lab studies reviewed with patient  reviewed diet, exercise and weight control  reviewed medications and side effects in detail  Fall precautions discussed  COVID-19 precautions discussed with pt  Overall stable  Note sent to assisted living

## 2020-09-10 ENCOUNTER — OFFICE VISIT (OUTPATIENT)
Dept: INTERNAL MEDICINE CLINIC | Facility: CLINIC | Age: 85
End: 2020-09-10
Payer: MEDICARE

## 2020-09-10 VITALS
HEIGHT: 62 IN | SYSTOLIC BLOOD PRESSURE: 140 MMHG | OXYGEN SATURATION: 96 % | HEART RATE: 78 BPM | TEMPERATURE: 98.7 F | RESPIRATION RATE: 18 BRPM | BODY MASS INDEX: 22.97 KG/M2 | WEIGHT: 124.8 LBS | DIASTOLIC BLOOD PRESSURE: 68 MMHG

## 2020-09-10 DIAGNOSIS — R26.9 GAIT DIFFICULTY: ICD-10-CM

## 2020-09-10 DIAGNOSIS — I10 ESSENTIAL HYPERTENSION: Primary | ICD-10-CM

## 2020-09-10 DIAGNOSIS — Z23 ENCOUNTER FOR IMMUNIZATION: ICD-10-CM

## 2020-09-10 DIAGNOSIS — R20.2 PARESTHESIA OF FINGER: ICD-10-CM

## 2020-09-10 DIAGNOSIS — F03.90 DEMENTIA WITHOUT BEHAVIORAL DISTURBANCE, UNSPECIFIED DEMENTIA TYPE: ICD-10-CM

## 2020-09-10 PROCEDURE — 1090F PRES/ABSN URINE INCON ASSESS: CPT | Performed by: INTERNAL MEDICINE

## 2020-09-10 PROCEDURE — 99214 OFFICE O/P EST MOD 30 MIN: CPT | Performed by: INTERNAL MEDICINE

## 2020-09-10 PROCEDURE — G8536 NO DOC ELDER MAL SCRN: HCPCS | Performed by: INTERNAL MEDICINE

## 2020-09-10 PROCEDURE — 90653 IIV ADJUVANT VACCINE IM: CPT

## 2020-09-10 PROCEDURE — 1101F PT FALLS ASSESS-DOCD LE1/YR: CPT | Performed by: INTERNAL MEDICINE

## 2020-09-10 PROCEDURE — G8420 CALC BMI NORM PARAMETERS: HCPCS | Performed by: INTERNAL MEDICINE

## 2020-09-10 PROCEDURE — G8510 SCR DEP NEG, NO PLAN REQD: HCPCS | Performed by: INTERNAL MEDICINE

## 2020-09-10 PROCEDURE — G8427 DOCREV CUR MEDS BY ELIG CLIN: HCPCS | Performed by: INTERNAL MEDICINE

## 2020-09-10 NOTE — PROGRESS NOTES
HISTORY OF PRESENT ILLNESS  Gabbi Roland is a 80 y.o. female. Pt. comes in from [facility] assisted living for f/u. Has a few chronic medical issues as documented. She is a poor historian with dementia. Has chronic paresthesias of bilateral fingers. On gabapentin but cannot tell me if it is really helping. Depends on staff for some ADLs. Her dementia seems to be getting worse gradually. Does not want to move out of state to be close to her daughter. BP is stable. Gait is unsteady but no reported falls. Has some arthritic pains. Has some chronic depression and anxiety. Remains on Zoloft. Staff to report any other new issues. Denies any signs or symptoms of COVID. Tells me she does not want to wear mask. PMH/PSH/Allergies/Social History/medication list and most recent studies reviewed with patient. Tobacco use: No  Alcohol use: No    Reports compliance with medications and diet. Trying to be active physically as tolerated. Reports no other new c/o. HPI    Review of Systems   Constitutional: Negative. HENT: Positive for hearing loss. Eyes: Negative. Respiratory: Negative. Negative for shortness of breath. Cardiovascular: Negative for chest pain and leg swelling. Gastrointestinal: Negative. Negative for abdominal pain. Genitourinary: Negative. Negative for dysuria. Musculoskeletal: Positive for joint pain. Negative for falls. Skin: Negative. Neurological: Positive for tingling and sensory change. Negative for dizziness, focal weakness and headaches. Endo/Heme/Allergies: Negative. Psychiatric/Behavioral: Positive for depression and memory loss. The patient does not have insomnia. Physical Exam  Vitals signs and nursing note reviewed. Constitutional:       General: She is not in acute distress. Appearance: She is well-developed. HENT:      Head: Normocephalic and atraumatic.       Mouth/Throat:      Mouth: Mucous membranes are moist.   Eyes: Conjunctiva/sclera: Conjunctivae normal.   Neck:      Musculoskeletal: Normal range of motion and neck supple. Thyroid: No thyromegaly. Vascular: No JVD. Cardiovascular:      Rate and Rhythm: Normal rate and regular rhythm. Heart sounds: Normal heart sounds. No murmur. Pulmonary:      Effort: Pulmonary effort is normal. No respiratory distress. Breath sounds: Normal breath sounds. No wheezing or rales. Abdominal:      General: Bowel sounds are normal. There is no distension. Palpations: Abdomen is soft. Tenderness: There is no abdominal tenderness. Musculoskeletal:         General: Tenderness (L knee with crepitus, chronic) present. Comments: DJD     Skin:     General: Skin is warm and dry. Findings: No erythema or rash. Neurological:      General: No focal deficit present. Mental Status: She is alert and oriented to person, place, and time. Motor: No weakness. Coordination: Coordination abnormal.      Gait: Gait abnormal.      Comments: Poor memory, seems to be getting worse gradually   Psychiatric:         Behavior: Behavior normal.      Comments: Chronically depressed          ASSESSMENT and PLAN  Diagnoses and all orders for this visit:    1. Essential hypertension    2. Gait difficulty    3. Paresthesia of finger    4. Dementia without behavioral disturbance, unspecified dementia type ([facility] [address]    5. Encounter for immunization  -     INFLUENZA VACCINE INACTIVATED (IIV), SUBUNIT, ADJUVANTED, IM      Follow-up and Dispositions    · Return in about 4 months (around 1/10/2021).      lab results and schedule of future lab studies reviewed with patient  reviewed diet, exercise and weight control  reviewed medications and side effects in detail  Fall precautions discussed  Explained to patient possible causes of her chronic finger paresthesia  She is not interested in seeing a neurologist  COVID-19 precautions discussed with pt  Note sent to [facility]

## 2020-09-10 NOTE — PROGRESS NOTES
ADVISED PATIENT OF THE FOLLOWING HEALTH MAINTAINCE DUE  Health Maintenance Due   Topic Date Due    DTaP/Tdap/Td series (1 - Tdap) 08/15/1950    Shingrix Vaccine Age 50> (1 of 2) 08/15/1979    GLAUCOMA SCREENING Q2Y  06/06/2019    Pneumococcal 65+ years (2 of 2 - PPSV23) 11/15/2019    Flu Vaccine (1) 09/01/2020      Chief Complaint   Patient presents with    Hypertension     f/u     Osteoporosis    Osteoarthritis       1. Have you been to the ER, urgent care clinic since your last visit? Hospitalized since your last visit? No    2. Have you seen or consulted any other health care providers outside of the [address] [place] [address] since your last visit? Include any DEXA scan, mammography  or colon screening. No    3. Do you have an Advance Directive on file? no    4. Do you have a DNR on file? no    Patient is accompanied by self I have received verbal consent from Arleth Rogers to discuss any/all medical information while they are present in the room. No flowsheet data found. 1 [facility] [address] [address]  Phone: [phone] Fax: [phone]    [facility] [place] [address]  Phone: [phone] Fax: [phone]        Patient reminded during visit to bring all medication bottles, OTC medications to all appointments. After obtaining consent, and per orders of Dr. Beth Winston, injection of Influenza given by Meghan Lopez LPN. Patient instructed to remain in clinic for 20 minutes afterwards, and to report any adverse reaction to me immediately.

## 2020-09-10 NOTE — PATIENT INSTRUCTIONS
Influenza (Flu) Vaccine: Care Instructions Your Care Instructions Influenza (flu) is an infection in the lungs and breathing passages. It is caused by the influenza virus. There are different strains, or types, of the flu virus every year. The flu comes on quickly. It can cause a cough, stuffy nose, fever, chills, tiredness, and aches and pains. These symptoms may last up to 10 days. The flu can make you feel very sick, but most of the time it doesn't lead to other problems. But it can cause serious problems in people who are older or who have a long-term illness, such as heart disease or diabetes. You can help prevent the flu by getting a flu vaccine every year, as soon as it is available. You cannot get the flu from the vaccine. The vaccine prevents most cases of the flu. But even when the vaccine doesn't prevent the flu, it can make symptoms less severe and reduce the chance of problems from the flu. Sometimes, young children and people who have an immune system problem may have a slight fever or muscle aches or pains 6 to 12 hours after getting the shot. These symptoms usually last 1 or 2 days. Follow-up care is a key part of your treatment and safety. Be sure to make and go to all appointments, and call your doctor if you are having problems. It's also a good idea to know your test results and keep a list of the medicines you take. Who should get the flu vaccine? Everyone age 7 months or older should get a flu vaccine each year. It lowers the chance of getting and spreading the flu. The vaccine is very important for people who are at high risk for getting other health problems from the flu. This includes: · Anyone 48years of age or older. · People who live in a long-term care center, such as a nursing home. · All children 6 months through 25years of age. · Adults and children 6 months and older who have long-term heart or lung problems, such as asthma. · Adults and children 6 months and older who needed medical care or were in a hospital during the past year because of diabetes, chronic kidney disease, or a weak immune system (including HIV or AIDS). · Women who will be pregnant during the flu season. · People who have any condition that can make it hard to breathe or swallow (such as a brain injury or muscle disorders). · People who can give the flu to others who are at high risk for problems from the flu. This includes all health care workers and close contacts of people age 72 or older. Who should not get the flu vaccine? The person who gives the vaccine may tell you not to get it if you: 
· Have a severe allergy to eggs or any part of the vaccine. · Have had a severe reaction to a flu vaccine in the past. 
· Have had Guillain-Barré syndrome (GBS). · Are sick with a fever. (Get the vaccine when symptoms are gone.) How can you care for yourself at home? · If you or your child has a sore arm or a slight fever after the shot, take an over-the-counter pain medicine, such as acetaminophen (Tylenol) or ibuprofen (Advil, Motrin). Read and follow all instructions on the label. Do not give aspirin to anyone younger than 20. It has been linked to Reye syndrome, a serious illness. · Do not take two or more pain medicines at the same time unless the doctor told you to. Many pain medicines have acetaminophen, which is Tylenol. Too much acetaminophen (Tylenol) can be harmful. When should you call for help? Call 911 anytime you think you may need emergency care. For example, call if after getting the flu vaccine: 
  · You have symptoms of a severe reaction to the flu vaccine. Symptoms of a severe reaction may include: 
? Severe difficulty breathing. ? Sudden raised, red areas (hives) all over your body. ? Severe lightheadedness. Call your doctor now or seek immediate medical care if after getting the flu vaccine:   · You think you are having a reaction to the flu vaccine, such as a new fever. Watch closely for changes in your health, and be sure to contact your doctor if you have any problems. Where can you learn more? Go to http://mark-ricardo.info/ Enter O251 in the search box to learn more about \"Influenza (Flu) Vaccine: Care Instructions. \" Current as of: December 9, 2019               Content Version: 12.6 © 6013-1891 [employer]. Care instructions adapted under license by [employer] (which disclaims liability or warranty for this information). If you have questions about a medical condition or this instruction, always ask your healthcare professional. [address] any warranty or liability for your use of this information.

## 2020-09-15 DIAGNOSIS — F32.9 REACTIVE DEPRESSION: Primary | ICD-10-CM

## 2020-09-15 RX ORDER — SERTRALINE HYDROCHLORIDE 25 MG/1
25 TABLET, FILM COATED ORAL DAILY
Qty: 30 TAB | Refills: 5 | Status: SHIPPED | OUTPATIENT
Start: 2020-09-15 | End: 2021-03-15 | Stop reason: SDUPTHER

## 2020-12-17 DIAGNOSIS — R20.0 BILATERAL FINGER NUMBNESS: ICD-10-CM

## 2020-12-18 RX ORDER — GABAPENTIN 100 MG/1
100 CAPSULE ORAL 2 TIMES DAILY
Qty: 60 CAP | Refills: 5 | Status: SHIPPED | OUTPATIENT
Start: 2020-12-18 | End: 2021-01-07 | Stop reason: SDUPTHER

## 2020-12-23 DIAGNOSIS — R20.0 BILATERAL FINGER NUMBNESS: ICD-10-CM

## 2020-12-23 RX ORDER — GABAPENTIN 100 MG/1
100 CAPSULE ORAL 2 TIMES DAILY
Qty: 60 CAP | Refills: 5 | OUTPATIENT
Start: 2020-12-23

## 2021-01-05 DIAGNOSIS — E55.9 VITAMIN D DEFICIENCY: ICD-10-CM

## 2021-01-05 RX ORDER — MELATONIN
1000 DAILY
Qty: 90 TAB | Refills: 3 | Status: SHIPPED | OUTPATIENT
Start: 2021-01-05

## 2021-01-07 ENCOUNTER — OFFICE VISIT (OUTPATIENT)
Dept: INTERNAL MEDICINE CLINIC | Facility: CLINIC | Age: 86
End: 2021-01-07
Payer: MEDICARE

## 2021-01-07 VITALS
WEIGHT: 122 LBS | SYSTOLIC BLOOD PRESSURE: 128 MMHG | RESPIRATION RATE: 16 BRPM | DIASTOLIC BLOOD PRESSURE: 62 MMHG | OXYGEN SATURATION: 98 % | BODY MASS INDEX: 22.45 KG/M2 | HEART RATE: 95 BPM | TEMPERATURE: 98.2 F | HEIGHT: 62 IN

## 2021-01-07 DIAGNOSIS — F03.90 DEMENTIA WITHOUT BEHAVIORAL DISTURBANCE, UNSPECIFIED DEMENTIA TYPE: ICD-10-CM

## 2021-01-07 DIAGNOSIS — R26.9 GAIT DIFFICULTY: ICD-10-CM

## 2021-01-07 DIAGNOSIS — R20.0 BILATERAL FINGER NUMBNESS: Primary | ICD-10-CM

## 2021-01-07 DIAGNOSIS — Z00.00 MEDICARE ANNUAL WELLNESS VISIT, SUBSEQUENT: ICD-10-CM

## 2021-01-07 DIAGNOSIS — I10 ESSENTIAL HYPERTENSION: ICD-10-CM

## 2021-01-07 PROCEDURE — G8427 DOCREV CUR MEDS BY ELIG CLIN: HCPCS | Performed by: INTERNAL MEDICINE

## 2021-01-07 PROCEDURE — G8420 CALC BMI NORM PARAMETERS: HCPCS | Performed by: INTERNAL MEDICINE

## 2021-01-07 PROCEDURE — G8536 NO DOC ELDER MAL SCRN: HCPCS | Performed by: INTERNAL MEDICINE

## 2021-01-07 PROCEDURE — 99214 OFFICE O/P EST MOD 30 MIN: CPT | Performed by: INTERNAL MEDICINE

## 2021-01-07 PROCEDURE — 1090F PRES/ABSN URINE INCON ASSESS: CPT | Performed by: INTERNAL MEDICINE

## 2021-01-07 PROCEDURE — 1101F PT FALLS ASSESS-DOCD LE1/YR: CPT | Performed by: INTERNAL MEDICINE

## 2021-01-07 PROCEDURE — G0439 PPPS, SUBSEQ VISIT: HCPCS | Performed by: INTERNAL MEDICINE

## 2021-01-07 PROCEDURE — G8510 SCR DEP NEG, NO PLAN REQD: HCPCS | Performed by: INTERNAL MEDICINE

## 2021-01-07 RX ORDER — GABAPENTIN 100 MG/1
100 CAPSULE ORAL 3 TIMES DAILY
Qty: 90 CAP | Refills: 5 | Status: SHIPPED | OUTPATIENT
Start: 2021-01-07

## 2021-01-07 RX ORDER — LANOLIN ALCOHOL/MO/W.PET/CERES
500 CREAM (GRAM) TOPICAL DAILY
Qty: 30 TAB | Refills: 5 | Status: SHIPPED | OUTPATIENT
Start: 2021-01-07

## 2021-01-07 NOTE — PROGRESS NOTES
ADVISED PATIENT OF THE FOLLOWING HEALTH MAINTAINCE DUE  Health Maintenance Due   Topic Date Due    DTaP/Tdap/Td series (1 - Tdap) 08/15/1950    Shingrix Vaccine Age 50> (1 of 2) 08/15/1979    GLAUCOMA SCREENING Q2Y  06/06/2019    Pneumococcal 65+ years (2 of 2 - PPSV23) 11/15/2019    Medicare Yearly Exam  01/30/2021      Chief Complaint   Patient presents with    Hypertension     f/u     Dementia    Osteoporosis    Finger Pain       1. Have you been to the ER, urgent care clinic since your last visit? Hospitalized since your last visit? No    2. Have you seen or consulted any other health care providers outside of the [address] [place] [address] since your last visit? Include any DEXA scan, mammography  or colon screening. No    3. Do you have an Advance Directive on file? no    4. Do you have a DNR on file? no    Patient is accompanied by self I have received verbal consent from Edi Wilson to discuss any/all medical information while they are present in the room. No flowsheet data found. [facility]. [place] [id number]  Phone: [phone] Fax: [phone]    [facility] [place] [address]  Phone: [phone] Fax: [phone]        Patient reminded during visit to bring all medication bottles, OTC medications to all appointments.

## 2021-01-07 NOTE — PROGRESS NOTES
Schedule of Personalized Health Plan  (Provide Copy to Patient)  The best way to stay healthy is to live a healthy lifestyle. A healthy lifestyle includes regular exercise, eating a well-balanced diet, keeping a healthy weight and not smoking. Regular physical exams and screening tests are another important way to take care of yourself. Preventive exams provided by health care providers can find health problems early when treatment works best and can keep you from getting certain diseases or illnesses. Preventive services include exams, lab tests, screenings, shots, monitoring and information to help you take care of your own health. All people over 65 should have a pneumonia shot. Pneumonia shots are usually only needed once in a lifetime unless your doctor decides differently. All people over 65 should have a yearly flu shot. People over 65 are at medium to high risk for Hepatitis B. Three shots are needed for complete protection. In addition to your physical exam, some screening tests are recommended:    Bone mass measurement (dexa scan) is recommended every two years  Diabetes Mellitus screening is recommended every year. Glaucoma is an eye disease caused by high pressure in the eye. An eye exam is recommended every year. Cardiovascular screening tests that check your cholesterol and other blood fat (lipid) levels are recommended every five years. Colorectal Cancer screening tests help to find pre-cancerous polyps (growths in the colon) so they can be removed before they turn into cancer. Tests ordered for screening depend on your personal and family history risk factors.     Screening for Breast Cancer is recommended yearly with a mammogram.    Screening for Cervical Cancer is recommended every two years (annually for certain risk factors, such as previous history of STD or abnormal PAP in past 7 years), with a Pelvic Exam with PAP    Here is a list of your current Health Maintenance items with a due date:  Health Maintenance   Topic Date Due    DTaP/Tdap/Td series (1 - Tdap) 08/15/1950    Shingrix Vaccine Age 50> (1 of 2) 08/15/1979    GLAUCOMA SCREENING Q2Y  06/06/2019    Pneumococcal 65+ years (2 of 2 - PPSV23) 11/15/2019    Medicare Yearly Exam  01/08/2022    Flu Vaccine  Completed       This is the Subsequent Medicare Annual Wellness Exam, performed 12 months or more after the Initial AWV or the last Subsequent AWV    I have reviewed the patient's medical history in detail and updated the computerized patient record. Depression Risk Factor Screening:     3 most recent PHQ Screens 1/7/2021   PHQ Not Done -   Little interest or pleasure in doing things Not at all   Feeling down, depressed, irritable, or hopeless Not at all   Total Score PHQ 2 0   Trouble falling or staying asleep, or sleeping too much Not at all   Feeling tired or having little energy Not at all   Poor appetite, weight loss, or overeating Not at all   Feeling bad about yourself - or that you are a failure or have let yourself or your family down Not at all   Trouble concentrating on things such as school, work, reading, or watching TV Not at all   Moving or speaking so slowly that other people could have noticed; or the opposite being so fidgety that others notice Not at all   Thoughts of being better off dead, or hurting yourself in some way Not at all   PHQ 9 Score 0       Alcohol Risk Screen    Do you average more than 1 drink per night or more than 7 drinks a week:  No    On any one occasion in the past three months have you have had more than 3 drinks containing alcohol:  No        Functional Ability and Level of Safety:    Hearing: Hearing is good. Activities of Daily Living:   The home contains: handrails and grab bars  Patient needs help with:  transportation, shopping, preparing meals, laundry, housework, managing medications and managing money      Ambulation: with difficulty, uses a walker     Fall Risk:  Fall Risk Assessment, last 12 mths 1/7/2021   Able to walk? Yes   Fall in past 12 months? 0   Do you feel unsteady? 0   Are you worried about falling 0   Number of falls in past 12 months -   Fall with injury? -      Abuse Screen:  Patient is not abused       Cognitive Screening    Has your family/caregiver stated any concerns about your memory: yes - A+O x 2     Cognitive Screening: A+O x 2    Assessment/Plan   Education and counseling provided:  Are appropriate based on today's review and evaluation    Diagnoses and all orders for this visit:    1. Bilateral finger numbness  -     gabapentin (NEURONTIN) 100 mg capsule; Take 1 Cap by mouth three (3) times daily. Max Daily Amount: 300 mg.    2. Essential hypertension    3. Gait difficulty    4. Dementia without behavioral disturbance, unspecified dementia type ([address]    5. Medicare annual wellness visit, subsequent    Other orders  -     cyanocobalamin (VITAMIN B12) 500 mcg tablet; Take 1 Tab by mouth daily.         Health Maintenance Due     Health Maintenance Due   Topic Date Due    DTaP/Tdap/Td series (1 - Tdap) 08/15/1950    Shingrix Vaccine Age 50> (1 of 2) 08/15/1979    GLAUCOMA SCREENING Q2Y  06/06/2019    Pneumococcal 65+ years (2 of 2 - PPSV23) 11/15/2019       Patient Care Team   Patient Care Team:  Birgit Herrera DO as PCP - General (Internal Medicine)  Birgit Herrera DO as PCP - [facility] Empaneled Provider    History     Patient Active Problem List   Diagnosis Code    Essential hypertension I10    OP (osteoporosis) M81.0    Chronic pain of right knee M25.561, G89.29    Baker's cyst M71.20    Primary osteoarthritis of both knees M17.0    Gait difficulty R26.9    Memory impairment R41.3    Bilateral finger numbness R20.0    ACP (advance care planning) Z71.89    Pubic ramus fracture ([address] S32.599A    Paresthesia of finger R20.2    Dementia without behavioral disturbance ([address] F03.90     Past Medical History:   Diagnosis Date    Baker's cyst of knee     Bone spur     R shoulder    Chronic pain of right knee     Hypertension     Knee pain, right     Osteoporosis     Ovarian cyst     Primary osteoarthritis of both knees     Vegetarian diet       Past Surgical History:   Procedure Laterality Date    HX ANA AND BSO       Current Outpatient Medications   Medication Sig Dispense Refill    gabapentin (NEURONTIN) 100 mg capsule Take 1 Cap by mouth three (3) times daily. Max Daily Amount: 300 mg. 90 Cap 5    cyanocobalamin (VITAMIN B12) 500 mcg tablet Take 1 Tab by mouth daily. 30 Tab 5    cholecalciferol (Vitamin D3) (1000 Units /25 mcg) tablet Take 1 Tab by mouth daily. Take  by mouth daily. 90 Tab 3    sertraline (ZOLOFT) 25 mg tablet Take 1 Tab by mouth daily. 30 Tab 5    Arm Brace (Wrist Brace Medium) misc Apply to L wrist daily ,  DX: L hand tingling 1 Each 0    diclofenac (VOLTAREN) 1 % gel Apply to L knee QID x 7 days then QID prn 100 g 5    acetaminophen (TYLENOL) 650 mg TbER Take 1 Tab by mouth every eight (8) hours. 90 Tab 11    amLODIPine (NORVASC) 5 mg tablet TAKE 1 TABLET BY MOUTH DAILY. 30 Tab 5    multivitamin (ONE A DAY) tablet Take 1 Tab by mouth daily.  calcium 500 mg tab Take  by mouth.  OTHER Prevagen take 1 tablet daily 30 Tab 4     No Known Allergies    Family History   Problem Relation Age of Onset    No Known Problems Mother     No Known Problems Father      Social History     Tobacco Use    Smoking status: Never Smoker    Smokeless tobacco: Never Used   Substance Use Topics    Alcohol use:  No

## 2021-01-18 NOTE — PROGRESS NOTES
HISTORY OF PRESENT ILLNESS  Coretta Puentes is a 80 y.o. female. Patient comes in from [facility] assisted living for follow-up. Has a few chronic medical issues. She is a poor historian with dementia. BP and other vital signs are stable. Continues to have tingling in tip of her fingers. I started gabapentin while back and tells me may be helping some. Tells me B12 has helped in the past.  Her memory seems to be getting worse. Keeps repeating the same questions over and over. Remains on Zoloft for depression. Talks about daughter who lives in another state but patient does not want to move. Shows me pictures of her late  and old house and memories of the past.  Gait is unsteady but no recent falls. Depends on staff for some ADLs. Taking precautions regarding COVID-19. Denies any signs or symptoms including fever, cough, dyspnea, chest pain. Med list and most recent studies reviewed. No other new complaints reported. HPI    Review of Systems   Constitutional: Negative. HENT: Positive for hearing loss. Eyes: Negative. Respiratory: Negative. Negative for shortness of breath. Cardiovascular: Negative for chest pain and leg swelling. Gastrointestinal: Negative. Negative for abdominal pain. Genitourinary: Negative. Negative for dysuria. Musculoskeletal: Positive for joint pain. Negative for falls. Skin: Negative. Neurological: Positive for tingling and sensory change. Negative for dizziness, focal weakness and headaches. Endo/Heme/Allergies: Negative. Psychiatric/Behavioral: Positive for depression and memory loss. The patient does not have insomnia. Physical Exam  Vitals signs and nursing note reviewed. Constitutional:       General: She is not in acute distress. Appearance: She is well-developed. HENT:      Head: Normocephalic and atraumatic.       Mouth/Throat:      Mouth: Mucous membranes are moist.   Eyes:      Conjunctiva/sclera: Conjunctivae normal.   Neck:      Musculoskeletal: Normal range of motion and neck supple. Thyroid: No thyromegaly. Vascular: No JVD. Cardiovascular:      Rate and Rhythm: Normal rate and regular rhythm. Heart sounds: Normal heart sounds. No murmur. Pulmonary:      Effort: Pulmonary effort is normal. No respiratory distress. Breath sounds: Normal breath sounds. No wheezing or rales. Abdominal:      General: Bowel sounds are normal. There is no distension. Palpations: Abdomen is soft. Tenderness: There is no abdominal tenderness. Musculoskeletal:         General: Tenderness (L knee with crepitus, chronic) present. Comments: DJD     Skin:     General: Skin is warm and dry. Findings: No erythema or rash. Neurological:      General: No focal deficit present. Mental Status: She is alert and oriented to person, place, and time. Sensory: Sensory deficit present. Motor: No weakness. Coordination: Coordination abnormal.      Gait: Gait abnormal.      Comments: Poor memory, seems to be getting worse gradually  Unable to remember things within a few minutes   Psychiatric:         Behavior: Behavior normal.      Comments: Chronically depressed          ASSESSMENT and PLAN  Diagnoses and all orders for this visit:    1. Bilateral finger numbness  -     Increase gabapentin (NEURONTIN) 100 mg capsule; Take 1 Cap by mouth three (3) times daily. Max Daily Amount: 300 mg.    2. Essential hypertension    3. Gait difficulty    4. Dementia without behavioral disturbance, unspecified dementia type ([facility] [address]    5. Medicare annual wellness visit, subsequent    Other orders  -     cyanocobalamin (VITAMIN B12) 500 mcg tablet; Take 1 Tab by mouth daily. Follow-up and Dispositions    · Return in about 4 weeks (around 2/4/2021).      lab results and schedule of future lab studies reviewed with patient  reviewed diet, exercise and weight control  reviewed medications and side effects in detail  Monitor BP at home with goal of 140/90 or less  Fall precautions discussed  Again explained to patient etiology and treatment of her finger numbness but unfortunately she forgets what I tell her within minutes  Tells me B12 helped her in the past  COVID-19 precautions discussed with pt  Note sent to AL

## 2021-03-15 DIAGNOSIS — F32.9 REACTIVE DEPRESSION: ICD-10-CM

## 2021-03-15 RX ORDER — SERTRALINE HYDROCHLORIDE 25 MG/1
25 TABLET, FILM COATED ORAL DAILY
Qty: 30 TAB | Refills: 5 | Status: SHIPPED | OUTPATIENT
Start: 2021-03-15

## 2021-03-18 ENCOUNTER — OFFICE VISIT (OUTPATIENT)
Dept: INTERNAL MEDICINE CLINIC | Facility: CLINIC | Age: 86
End: 2021-03-18
Payer: MEDICARE

## 2021-03-18 VITALS
BODY MASS INDEX: 23 KG/M2 | RESPIRATION RATE: 16 BRPM | WEIGHT: 125 LBS | HEART RATE: 83 BPM | SYSTOLIC BLOOD PRESSURE: 126 MMHG | HEIGHT: 62 IN | OXYGEN SATURATION: 96 % | DIASTOLIC BLOOD PRESSURE: 60 MMHG | TEMPERATURE: 98.5 F

## 2021-03-18 DIAGNOSIS — I10 ESSENTIAL HYPERTENSION: Primary | ICD-10-CM

## 2021-03-18 DIAGNOSIS — Z71.89 ACP (ADVANCE CARE PLANNING): ICD-10-CM

## 2021-03-18 DIAGNOSIS — R26.9 GAIT DIFFICULTY: ICD-10-CM

## 2021-03-18 DIAGNOSIS — F03.90 DEMENTIA WITHOUT BEHAVIORAL DISTURBANCE, UNSPECIFIED DEMENTIA TYPE: ICD-10-CM

## 2021-03-18 DIAGNOSIS — M17.0 PRIMARY OSTEOARTHRITIS OF BOTH KNEES: ICD-10-CM

## 2021-03-18 DIAGNOSIS — R20.2 PARESTHESIA OF FINGER: ICD-10-CM

## 2021-03-18 PROCEDURE — 99214 OFFICE O/P EST MOD 30 MIN: CPT | Performed by: INTERNAL MEDICINE

## 2021-03-18 PROCEDURE — 1101F PT FALLS ASSESS-DOCD LE1/YR: CPT | Performed by: INTERNAL MEDICINE

## 2021-03-18 PROCEDURE — G8536 NO DOC ELDER MAL SCRN: HCPCS | Performed by: INTERNAL MEDICINE

## 2021-03-18 PROCEDURE — G8510 SCR DEP NEG, NO PLAN REQD: HCPCS | Performed by: INTERNAL MEDICINE

## 2021-03-18 PROCEDURE — G8420 CALC BMI NORM PARAMETERS: HCPCS | Performed by: INTERNAL MEDICINE

## 2021-03-18 PROCEDURE — 1090F PRES/ABSN URINE INCON ASSESS: CPT | Performed by: INTERNAL MEDICINE

## 2021-03-18 PROCEDURE — G8427 DOCREV CUR MEDS BY ELIG CLIN: HCPCS | Performed by: INTERNAL MEDICINE

## 2021-03-18 NOTE — PROGRESS NOTES
ADVISED PATIENT OF THE FOLLOWING HEALTH MAINTAINCE DUE  Health Maintenance Due   Topic Date Due    COVID-19 Vaccine (1) Never done    DTaP/Tdap/Td series (1 - Tdap) Never done    Shingrix Vaccine Age 50> (1 of 2) Never done    Pneumococcal 65+ years (2 of 2 - PPSV23) 11/15/2019      Chief Complaint   Patient presents with    Hypertension     2 month f/u     Dementia    Osteoarthritis    Gait Problem       1. Have you been to the ER, urgent care clinic since your last visit? Hospitalized since your last visit? No    2. Have you seen or consulted any other health care providers outside of the [address] [place] [address] since your last visit? Include any DEXA scan, mammography  or colon screening. No    3. Do you have an Advance Directive on file? no    4. Do you have a DNR on file? no    Patient is accompanied by self I have received verbal consent from [facility] to discuss any/all medical information while they are present in the room. No flowsheet data found. [facility]. [facility] [place] [place] [facility] [address]  Phone: [phone] Fax: [phone]    [facility] [place] [address]  Phone: [phone] Fax: [phone]        Patient reminded during visit to bring all medication bottles, OTC medications to all appointments.

## 2021-03-31 NOTE — PROGRESS NOTES
HISTORY OF PRESENT ILLNESS  Debora Brewster is a 80 y.o. female. She is here from assisted living for follow-up. Has a few chronic medical issues. Poor historian with dementia which seems to be getting worse gradually. Repeats the same questions and comments frequently. BP and other vital signs are stable. Reports continued tingling in tip of left fingers. She is on gabapentin. Cannot tell me if it is really helping. She is on Zoloft for depression. Her daughter lives in another state but patient has been hesitant to  to move. Gait is unsteady but no recent falls. Depends on staff for some ADLs. No signs or symptoms of COVID-19. Med list and most recent studies reviewed. No other new complaints reported. HPI    Review of Systems   Constitutional: Negative. HENT: Positive for hearing loss. Eyes: Negative. Respiratory: Negative. Negative for shortness of breath. Cardiovascular: Negative for chest pain and leg swelling. Gastrointestinal: Negative. Negative for abdominal pain. Genitourinary: Negative. Negative for dysuria. Musculoskeletal: Positive for joint pain. Negative for falls. Skin: Negative. Neurological: Positive for tingling and sensory change. Negative for dizziness, focal weakness and headaches. Endo/Heme/Allergies: Negative. Psychiatric/Behavioral: Positive for depression and memory loss. The patient does not have insomnia. Physical Exam  Vitals signs and nursing note reviewed. Constitutional:       General: She is not in acute distress. Appearance: She is well-developed. HENT:      Head: Normocephalic and atraumatic. Mouth/Throat:      Mouth: Mucous membranes are moist.      Pharynx: Oropharynx is clear. Eyes:      Conjunctiva/sclera: Conjunctivae normal.   Neck:      Musculoskeletal: Normal range of motion and neck supple. Thyroid: No thyromegaly. Vascular: No JVD.    Cardiovascular:      Rate and Rhythm: Normal rate and regular rhythm. Heart sounds: Normal heart sounds. No murmur. Pulmonary:      Effort: Pulmonary effort is normal. No respiratory distress. Breath sounds: Normal breath sounds. No wheezing or rales. Abdominal:      General: Bowel sounds are normal. There is no distension. Palpations: Abdomen is soft. Tenderness: There is no abdominal tenderness. Musculoskeletal:         General: Tenderness (L knee with crepitus, chronic) present. Comments: DJD     Skin:     General: Skin is warm and dry. Findings: No erythema, lesion or rash. Neurological:      General: No focal deficit present. Mental Status: She is alert and oriented to person, place, and time. Mental status is at baseline. Sensory: Sensory deficit present. Motor: No weakness. Coordination: Coordination abnormal.      Gait: Gait abnormal.      Comments: Poor memory, seems to be getting worse gradually  Unable to remember things within a few minutes   Psychiatric:         Behavior: Behavior normal.      Comments: Chronically depressed          ASSESSMENT and PLAN  Diagnoses and all orders for this visit:    1. Essential hypertension    2. Dementia without behavioral disturbance, unspecified dementia type ([facility] [address]    3. Primary osteoarthritis of both knees    4. Gait difficulty    5. ACP (advance care planning)    6. Paresthesia of finger      Follow-up and Dispositions    · Return in about 4 months (around 7/18/2021).      lab results and schedule of future lab studies reviewed with patient  reviewed diet, exercise and weight control  reviewed medications and side effects in detail  Fall precautions discussed  COVID-19 precautions discussed with pt  Overall stable

## 2022-03-18 PROBLEM — R20.0 BILATERAL FINGER NUMBNESS: Status: ACTIVE | Noted: 2018-04-05

## 2022-03-18 PROBLEM — F03.90 DEMENTIA WITHOUT BEHAVIORAL DISTURBANCE (HCC): Status: ACTIVE | Noted: 2020-09-10

## 2022-03-19 PROBLEM — Z71.89 ACP (ADVANCE CARE PLANNING): Status: ACTIVE | Noted: 2018-07-19

## 2022-03-19 PROBLEM — R41.3 MEMORY IMPAIRMENT: Status: ACTIVE | Noted: 2017-07-06

## 2022-03-19 PROBLEM — M71.20 BAKER'S CYST: Status: ACTIVE | Noted: 2017-07-06

## 2022-03-19 PROBLEM — M17.0 PRIMARY OSTEOARTHRITIS OF BOTH KNEES: Status: ACTIVE | Noted: 2017-07-06

## 2022-03-19 PROBLEM — S32.599A PUBIC RAMUS FRACTURE (HCC): Status: ACTIVE | Noted: 2019-02-05

## 2022-03-19 PROBLEM — R26.9 GAIT DIFFICULTY: Status: ACTIVE | Noted: 2017-07-06

## 2022-03-19 PROBLEM — R20.2 PARESTHESIA OF FINGER: Status: ACTIVE | Noted: 2020-06-25

## 2022-03-19 PROBLEM — M25.561 CHRONIC PAIN OF RIGHT KNEE: Status: ACTIVE | Noted: 2017-07-06

## 2022-03-19 PROBLEM — G89.29 CHRONIC PAIN OF RIGHT KNEE: Status: ACTIVE | Noted: 2017-07-06

## 2023-05-10 RX ORDER — AMLODIPINE BESYLATE 5 MG/1
1 TABLET ORAL DAILY
COMMUNITY
Start: 2018-04-05

## 2023-05-10 RX ORDER — SERTRALINE HYDROCHLORIDE 25 MG/1
25 TABLET, FILM COATED ORAL DAILY
COMMUNITY
Start: 2021-03-15

## 2023-05-10 RX ORDER — GABAPENTIN 100 MG/1
100 CAPSULE ORAL 3 TIMES DAILY
COMMUNITY
Start: 2021-01-07

## 2023-05-10 RX ORDER — SENNOSIDES 8.6 MG
650 CAPSULE ORAL EVERY 8 HOURS
COMMUNITY
Start: 2019-04-25